# Patient Record
Sex: FEMALE | Race: BLACK OR AFRICAN AMERICAN | NOT HISPANIC OR LATINO | URBAN - METROPOLITAN AREA
[De-identification: names, ages, dates, MRNs, and addresses within clinical notes are randomized per-mention and may not be internally consistent; named-entity substitution may affect disease eponyms.]

---

## 2017-09-10 ENCOUNTER — EMERGENCY (EMERGENCY)
Facility: HOSPITAL | Age: 32
LOS: 1 days | Discharge: HOME | End: 2017-09-10
Attending: EMERGENCY MEDICINE
Payer: COMMERCIAL

## 2017-09-10 VITALS
RESPIRATION RATE: 20 BRPM | WEIGHT: 145.06 LBS | HEIGHT: 68 IN | DIASTOLIC BLOOD PRESSURE: 83 MMHG | HEART RATE: 97 BPM | SYSTOLIC BLOOD PRESSURE: 170 MMHG | OXYGEN SATURATION: 100 % | TEMPERATURE: 98 F

## 2017-09-10 PROCEDURE — 76856 US EXAM PELVIC COMPLETE: CPT | Mod: 26

## 2017-09-10 PROCEDURE — 76830 TRANSVAGINAL US NON-OB: CPT | Mod: 26

## 2017-09-10 PROCEDURE — 99285 EMERGENCY DEPT VISIT HI MDM: CPT

## 2017-09-10 RX ORDER — KETOROLAC TROMETHAMINE 30 MG/ML
30 SYRINGE (ML) INJECTION ONCE
Qty: 0 | Refills: 0 | Status: DISCONTINUED | OUTPATIENT
Start: 2017-09-10 | End: 2017-09-10

## 2017-09-10 RX ORDER — MORPHINE SULFATE 50 MG/1
4 CAPSULE, EXTENDED RELEASE ORAL ONCE
Qty: 0 | Refills: 0 | Status: DISCONTINUED | OUTPATIENT
Start: 2017-09-10 | End: 2017-09-10

## 2017-09-10 RX ORDER — SODIUM CHLORIDE 9 MG/ML
3 INJECTION INTRAMUSCULAR; INTRAVENOUS; SUBCUTANEOUS ONCE
Qty: 0 | Refills: 0 | Status: COMPLETED | OUTPATIENT
Start: 2017-09-10 | End: 2017-09-10

## 2017-09-10 RX ORDER — SODIUM CHLORIDE 9 MG/ML
1000 INJECTION INTRAMUSCULAR; INTRAVENOUS; SUBCUTANEOUS ONCE
Qty: 0 | Refills: 0 | Status: COMPLETED | OUTPATIENT
Start: 2017-09-10 | End: 2017-09-10

## 2017-09-10 NOTE — ED PROVIDER NOTE - CARE PLAN
Principal Discharge DX:	Retained products of conception following   Secondary Diagnosis:	UTI (urinary tract infection)

## 2017-09-10 NOTE — ED ADULT NURSE NOTE - OBJECTIVE STATEMENT
32 y/o female with radha of termination of pregnancy 2 weeks ago & previous D&C  c/o of bilaterally lwr back pain and pelvic pain 9/10 with spotting that began 3 days s/p termination. Sx locations are tender to touch. Pt. denies any N/V/D at this time. Pt. on cardiac monitor with Sinus Tach, regular rhythm . Skin is warm, dry, and intact.

## 2017-09-10 NOTE — ED PROVIDER NOTE - CHPI ED SYMPTOMS POS
DIFFICULTY BEARING WEIGHT/+back pain +cloudy urine +back pain +cloudy urine/PAIN/DIFFICULTY BEARING WEIGHT

## 2017-09-10 NOTE — ED PROVIDER NOTE - OBJECTIVE STATEMENT
This is a 32 y/o F presenting to the ED complaining of back pain. Patient reports that she was 2 months pregnant and had it terminated 2 weeks ago. Since termination, patient reports that she has been having back pain and contraction like pain. Denies fever. Patient reports similar pain in past s/p termination and reports fever at this time. She also reports cloudy urine that is malodorous. She states that she was given a muscle relaxer and a percocet. Reports the muscle relaxer relieved pain.   A6

## 2017-09-10 NOTE — ED PROVIDER NOTE - PROGRESS NOTE DETAILS
stable.   seen by gyn.  no active bleeding.  will d/c with abx, pain meds, and gyn follow up in 72 hrs for rpt beta

## 2017-09-11 VITALS
OXYGEN SATURATION: 99 % | RESPIRATION RATE: 19 BRPM | DIASTOLIC BLOOD PRESSURE: 87 MMHG | HEART RATE: 70 BPM | SYSTOLIC BLOOD PRESSURE: 151 MMHG | TEMPERATURE: 98 F

## 2017-09-11 DIAGNOSIS — O03.4 INCOMPLETE SPONTANEOUS ABORTION WITHOUT COMPLICATION: ICD-10-CM

## 2017-09-11 LAB
ALBUMIN SERPL ELPH-MCNC: 4.5 G/DL — SIGNIFICANT CHANGE UP (ref 3.3–5.2)
ALLERGY+IMMUNOLOGY DIAG STUDY NOTE: SIGNIFICANT CHANGE UP
ALLERGY+IMMUNOLOGY DIAG STUDY NOTE: SIGNIFICANT CHANGE UP
ALP SERPL-CCNC: 49 U/L — SIGNIFICANT CHANGE UP (ref 40–120)
ALT FLD-CCNC: 11 U/L — SIGNIFICANT CHANGE UP
ANION GAP SERPL CALC-SCNC: 12 MMOL/L — SIGNIFICANT CHANGE UP (ref 5–17)
APPEARANCE UR: ABNORMAL
AST SERPL-CCNC: 17 U/L — SIGNIFICANT CHANGE UP
BACTERIA # UR AUTO: ABNORMAL
BASOPHILS # BLD AUTO: 0 K/UL — SIGNIFICANT CHANGE UP (ref 0–0.2)
BASOPHILS NFR BLD AUTO: 0.2 % — SIGNIFICANT CHANGE UP (ref 0–2)
BILIRUB SERPL-MCNC: 0.5 MG/DL — SIGNIFICANT CHANGE UP (ref 0.4–2)
BILIRUB UR-MCNC: NEGATIVE — SIGNIFICANT CHANGE UP
BLD GP AB SCN SERPL QL: ABNORMAL
BUN SERPL-MCNC: 10 MG/DL — SIGNIFICANT CHANGE UP (ref 8–20)
CALCIUM SERPL-MCNC: 9.3 MG/DL — SIGNIFICANT CHANGE UP (ref 8.6–10.2)
CHLORIDE SERPL-SCNC: 104 MMOL/L — SIGNIFICANT CHANGE UP (ref 98–107)
CO2 SERPL-SCNC: 25 MMOL/L — SIGNIFICANT CHANGE UP (ref 22–29)
COLOR SPEC: YELLOW — SIGNIFICANT CHANGE UP
CREAT SERPL-MCNC: 0.68 MG/DL — SIGNIFICANT CHANGE UP (ref 0.5–1.3)
DIFF PNL FLD: ABNORMAL
DIR ANTIGLOB POLYSPECIFIC INTERPRETATION: SIGNIFICANT CHANGE UP
EOSINOPHIL # BLD AUTO: 0 K/UL — SIGNIFICANT CHANGE UP (ref 0–0.5)
EOSINOPHIL NFR BLD AUTO: 0.6 % — SIGNIFICANT CHANGE UP (ref 0–6)
EPI CELLS # UR: SIGNIFICANT CHANGE UP
GLUCOSE SERPL-MCNC: 92 MG/DL — SIGNIFICANT CHANGE UP (ref 70–115)
GLUCOSE UR QL: NEGATIVE MG/DL — SIGNIFICANT CHANGE UP
HCG SERPL-ACNC: 215.9 MIU/ML — SIGNIFICANT CHANGE UP
HCG UR QL: POSITIVE
HCT VFR BLD CALC: 32 % — LOW (ref 37–47)
HGB BLD-MCNC: 10.8 G/DL — LOW (ref 12–16)
KETONES UR-MCNC: NEGATIVE — SIGNIFICANT CHANGE UP
LEUKOCYTE ESTERASE UR-ACNC: ABNORMAL
LYMPHOCYTES # BLD AUTO: 2.3 K/UL — SIGNIFICANT CHANGE UP (ref 1–4.8)
LYMPHOCYTES # BLD AUTO: 27.3 % — SIGNIFICANT CHANGE UP (ref 20–55)
MCHC RBC-ENTMCNC: 29 PG — SIGNIFICANT CHANGE UP (ref 27–31)
MCHC RBC-ENTMCNC: 33.8 G/DL — SIGNIFICANT CHANGE UP (ref 32–36)
MCV RBC AUTO: 86 FL — SIGNIFICANT CHANGE UP (ref 81–99)
MONOCYTES # BLD AUTO: 0.4 K/UL — SIGNIFICANT CHANGE UP (ref 0–0.8)
MONOCYTES NFR BLD AUTO: 4.3 % — SIGNIFICANT CHANGE UP (ref 3–10)
NEUTROPHILS # BLD AUTO: 5.8 K/UL — SIGNIFICANT CHANGE UP (ref 1.8–8)
NEUTROPHILS NFR BLD AUTO: 67.4 % — SIGNIFICANT CHANGE UP (ref 37–73)
NITRITE UR-MCNC: NEGATIVE — SIGNIFICANT CHANGE UP
PH UR: 6 — SIGNIFICANT CHANGE UP (ref 5–8)
PLATELET # BLD AUTO: 389 K/UL — SIGNIFICANT CHANGE UP (ref 150–400)
POTASSIUM SERPL-MCNC: 3.7 MMOL/L — SIGNIFICANT CHANGE UP (ref 3.5–5.3)
POTASSIUM SERPL-SCNC: 3.7 MMOL/L — SIGNIFICANT CHANGE UP (ref 3.5–5.3)
PROT SERPL-MCNC: 7.2 G/DL — SIGNIFICANT CHANGE UP (ref 6.6–8.7)
PROT UR-MCNC: NEGATIVE MG/DL — SIGNIFICANT CHANGE UP
RBC # BLD: 3.72 M/UL — LOW (ref 4.4–5.2)
RBC # FLD: 13.9 % — SIGNIFICANT CHANGE UP (ref 11–15.6)
RBC CASTS # UR COMP ASSIST: ABNORMAL /HPF (ref 0–4)
SODIUM SERPL-SCNC: 141 MMOL/L — SIGNIFICANT CHANGE UP (ref 135–145)
SP GR SPEC: 1.01 — SIGNIFICANT CHANGE UP (ref 1.01–1.02)
TYPE + AB SCN PNL BLD: SIGNIFICANT CHANGE UP
UROBILINOGEN FLD QL: NEGATIVE MG/DL — SIGNIFICANT CHANGE UP
WBC # BLD: 8.6 K/UL — SIGNIFICANT CHANGE UP (ref 4.8–10.8)
WBC # FLD AUTO: 8.6 K/UL — SIGNIFICANT CHANGE UP (ref 4.8–10.8)
WBC UR QL: SIGNIFICANT CHANGE UP

## 2017-09-11 PROCEDURE — 86077 PHYS BLOOD BANK SERV XMATCH: CPT

## 2017-09-11 RX ORDER — CEFPODOXIME PROXETIL 100 MG
1 TABLET ORAL
Qty: 20 | Refills: 0 | OUTPATIENT
Start: 2017-09-11 | End: 2017-09-21

## 2017-09-11 RX ORDER — HYDROMORPHONE HYDROCHLORIDE 2 MG/ML
1 INJECTION INTRAMUSCULAR; INTRAVENOUS; SUBCUTANEOUS ONCE
Qty: 0 | Refills: 0 | Status: DISCONTINUED | OUTPATIENT
Start: 2017-09-11 | End: 2017-09-11

## 2017-09-11 RX ORDER — CEFTRIAXONE 500 MG/1
1 INJECTION, POWDER, FOR SOLUTION INTRAMUSCULAR; INTRAVENOUS ONCE
Qty: 0 | Refills: 0 | Status: COMPLETED | OUTPATIENT
Start: 2017-09-11 | End: 2017-09-11

## 2017-09-11 RX ORDER — HYDROMORPHONE HYDROCHLORIDE 2 MG/ML
0.5 INJECTION INTRAMUSCULAR; INTRAVENOUS; SUBCUTANEOUS ONCE
Qty: 0 | Refills: 0 | Status: DISCONTINUED | OUTPATIENT
Start: 2017-09-11 | End: 2017-09-11

## 2017-09-11 RX ADMIN — Medication 30 MILLIGRAM(S): at 00:29

## 2017-09-11 RX ADMIN — MORPHINE SULFATE 4 MILLIGRAM(S): 50 CAPSULE, EXTENDED RELEASE ORAL at 00:29

## 2017-09-11 RX ADMIN — SODIUM CHLORIDE 1000 MILLILITER(S): 9 INJECTION INTRAMUSCULAR; INTRAVENOUS; SUBCUTANEOUS at 00:28

## 2017-09-11 RX ADMIN — MORPHINE SULFATE 4 MILLIGRAM(S): 50 CAPSULE, EXTENDED RELEASE ORAL at 00:30

## 2017-09-11 RX ADMIN — HYDROMORPHONE HYDROCHLORIDE 1 MILLIGRAM(S): 2 INJECTION INTRAMUSCULAR; INTRAVENOUS; SUBCUTANEOUS at 04:20

## 2017-09-11 RX ADMIN — CEFTRIAXONE 100 GRAM(S): 500 INJECTION, POWDER, FOR SOLUTION INTRAMUSCULAR; INTRAVENOUS at 04:20

## 2017-09-11 RX ADMIN — SODIUM CHLORIDE 3 MILLILITER(S): 9 INJECTION INTRAMUSCULAR; INTRAVENOUS; SUBCUTANEOUS at 00:29

## 2017-09-11 RX ADMIN — HYDROMORPHONE HYDROCHLORIDE 0.5 MILLIGRAM(S): 2 INJECTION INTRAMUSCULAR; INTRAVENOUS; SUBCUTANEOUS at 01:20

## 2017-09-11 RX ADMIN — HYDROMORPHONE HYDROCHLORIDE 0.5 MILLIGRAM(S): 2 INJECTION INTRAMUSCULAR; INTRAVENOUS; SUBCUTANEOUS at 02:06

## 2017-09-11 NOTE — CONSULT NOTE ADULT - ASSESSMENT
A 31y  presenting with abdominal cramping and bleeding s/p termination on 17. Patient has small amount of retained products on US and her HCG is low at 215.9. She is likely to expel the rest of the products without a need for a D&C. There is no acute bleeding.

## 2017-09-11 NOTE — CONSULT NOTE ADULT - SUBJECTIVE AND OBJECTIVE BOX
DEYA ARTEAGA 31y  presenting with abdominal cramping and bleeding s/p termination on 17. Her pain comes and goes and she had episodes of bleeding today.    PMH: none  PSH: D&C x7, left breast cyst removal 17 years ago  Allergies: No Known Allergies  Meds: none  POB/GYN Hx: leep procedure; D&C x7; 3     Vital Signs:  Vital Signs Last 24 Hrs  T(C): 36.8 (11 Sep 2017 03:45), Max: 36.9 (10 Sep 2017 21:28)  T(F): 98.3 (11 Sep 2017 03:45), Max: 98.5 (10 Sep 2017 21:28)  HR: 70 (11 Sep 2017 03:45) (64 - 104)  BP: 151/87 (11 Sep 2017 03:45) (151/87 - 172/103)  RR: 19 (11 Sep 2017 03:45) (18 - 20)  SpO2: 99% (11 Sep 2017 03:45) (99% - 100%)    Physical Exam:  Speculum: Normal external genetalia, no lesions in vaginal canal, no pooling in vaginal vault, no discharge or bleeding from cervical os.     Labs:                      10.8   8.6   )-----------( 389      ( 11 Sep 2017 00:29 )             32.0     HCG Quantitative, Serum: 215.9 mIU/mL (17 @ 00:29)    Radiology:  < from: US Transvaginal (09.10.17 @ 23:32) >   EXAM:  US PELVIC COMPLETE                         EXAM:  US TRANSVAGINAL                          PROCEDURE DATE:  09/10/2017          INTERPRETATION:  CLINICAL INFORMATION: Bleeding, recent surgical   termination pregnancy on 2017    Beta-hCG pending    COMPARISON: Ultrasound 2016    TECHNIQUE:     Transabdominal and Endovaginal pelvic sonogram. Color and Spectral   Doppler was performed.    FINDINGS:    Uterus/endometrium: 8.4 x 4.4 x 6.1 cm. The endometrium measures 1 cm and   is heterogeneous. Doppler imaging demonstrates a stalk of high velocity   flow extending to the endometrium at the fundus (image 24). Peak systolic   velocity along this stalk measures 47.7 cm/s.    Right ovary: 3.1 x 1.5 x 2.9 cm. Flow is preserved    Left ovary: 3.1 x 1.5 x 2.7 cm. Flow is preserved    Fluid: None.    IMPRESSION:    The endometrium is heterogeneous although not overtly thickened. Doppler   imaging demonstrates a stalk of high velocity flow extending to the   endometrium at the fundus with a peak systolic velocity of 47 cm/s.   Findings suggest a small amount of retained products of conception.   Correlate with beta hCG and follow-up as necessary.      < end of copied text >

## 2017-09-13 ENCOUNTER — EMERGENCY (EMERGENCY)
Facility: HOSPITAL | Age: 32
LOS: 1 days | Discharge: HOME | End: 2017-09-13
Attending: EMERGENCY MEDICINE
Payer: COMMERCIAL

## 2017-09-13 VITALS
OXYGEN SATURATION: 99 % | WEIGHT: 145.06 LBS | HEART RATE: 82 BPM | HEIGHT: 67 IN | SYSTOLIC BLOOD PRESSURE: 148 MMHG | TEMPERATURE: 99 F | RESPIRATION RATE: 16 BRPM | DIASTOLIC BLOOD PRESSURE: 102 MMHG

## 2017-09-13 DIAGNOSIS — O03.9 COMPLETE OR UNSPECIFIED SPONTANEOUS ABORTION WITHOUT COMPLICATION: ICD-10-CM

## 2017-09-13 LAB
ALBUMIN SERPL ELPH-MCNC: 4.3 G/DL — SIGNIFICANT CHANGE UP (ref 3.3–5.2)
ALP SERPL-CCNC: 47 U/L — SIGNIFICANT CHANGE UP (ref 40–120)
ALT FLD-CCNC: 13 U/L — SIGNIFICANT CHANGE UP
ANION GAP SERPL CALC-SCNC: 13 MMOL/L — SIGNIFICANT CHANGE UP (ref 5–17)
APPEARANCE UR: CLEAR — SIGNIFICANT CHANGE UP
AST SERPL-CCNC: 21 U/L — SIGNIFICANT CHANGE UP
BASOPHILS # BLD AUTO: 0 K/UL — SIGNIFICANT CHANGE UP (ref 0–0.2)
BASOPHILS NFR BLD AUTO: 0.3 % — SIGNIFICANT CHANGE UP (ref 0–2)
BILIRUB SERPL-MCNC: 0.3 MG/DL — LOW (ref 0.4–2)
BILIRUB UR-MCNC: NEGATIVE — SIGNIFICANT CHANGE UP
BUN SERPL-MCNC: 8 MG/DL — SIGNIFICANT CHANGE UP (ref 8–20)
CALCIUM SERPL-MCNC: 9 MG/DL — SIGNIFICANT CHANGE UP (ref 8.6–10.2)
CHLORIDE SERPL-SCNC: 101 MMOL/L — SIGNIFICANT CHANGE UP (ref 98–107)
CO2 SERPL-SCNC: 27 MMOL/L — SIGNIFICANT CHANGE UP (ref 22–29)
COLOR SPEC: YELLOW — SIGNIFICANT CHANGE UP
CREAT SERPL-MCNC: 0.71 MG/DL — SIGNIFICANT CHANGE UP (ref 0.5–1.3)
DIFF PNL FLD: NEGATIVE — SIGNIFICANT CHANGE UP
EOSINOPHIL # BLD AUTO: 0 K/UL — SIGNIFICANT CHANGE UP (ref 0–0.5)
EOSINOPHIL NFR BLD AUTO: 0.3 % — SIGNIFICANT CHANGE UP (ref 0–6)
EPI CELLS # UR: SIGNIFICANT CHANGE UP
GLUCOSE SERPL-MCNC: 99 MG/DL — SIGNIFICANT CHANGE UP (ref 70–115)
GLUCOSE UR QL: NEGATIVE MG/DL — SIGNIFICANT CHANGE UP
HCG SERPL-ACNC: 137.7 MIU/ML — SIGNIFICANT CHANGE UP
HCT VFR BLD CALC: 33.9 % — LOW (ref 37–47)
HGB BLD-MCNC: 11.1 G/DL — LOW (ref 12–16)
KETONES UR-MCNC: NEGATIVE — SIGNIFICANT CHANGE UP
LEUKOCYTE ESTERASE UR-ACNC: ABNORMAL
LYMPHOCYTES # BLD AUTO: 1.6 K/UL — SIGNIFICANT CHANGE UP (ref 1–4.8)
LYMPHOCYTES # BLD AUTO: 26.6 % — SIGNIFICANT CHANGE UP (ref 20–55)
MCHC RBC-ENTMCNC: 28.8 PG — SIGNIFICANT CHANGE UP (ref 27–31)
MCHC RBC-ENTMCNC: 32.7 G/DL — SIGNIFICANT CHANGE UP (ref 32–36)
MCV RBC AUTO: 87.8 FL — SIGNIFICANT CHANGE UP (ref 81–99)
MONOCYTES # BLD AUTO: 0.2 K/UL — SIGNIFICANT CHANGE UP (ref 0–0.8)
MONOCYTES NFR BLD AUTO: 4.3 % — SIGNIFICANT CHANGE UP (ref 3–10)
NEUTROPHILS # BLD AUTO: 4 K/UL — SIGNIFICANT CHANGE UP (ref 1.8–8)
NEUTROPHILS NFR BLD AUTO: 68.3 % — SIGNIFICANT CHANGE UP (ref 37–73)
NITRITE UR-MCNC: NEGATIVE — SIGNIFICANT CHANGE UP
PH UR: 6 — SIGNIFICANT CHANGE UP (ref 5–8)
PLATELET # BLD AUTO: 406 K/UL — HIGH (ref 150–400)
POTASSIUM SERPL-MCNC: 3.8 MMOL/L — SIGNIFICANT CHANGE UP (ref 3.5–5.3)
POTASSIUM SERPL-SCNC: 3.8 MMOL/L — SIGNIFICANT CHANGE UP (ref 3.5–5.3)
PROT SERPL-MCNC: 7.7 G/DL — SIGNIFICANT CHANGE UP (ref 6.6–8.7)
PROT UR-MCNC: NEGATIVE MG/DL — SIGNIFICANT CHANGE UP
RBC # BLD: 3.86 M/UL — LOW (ref 4.4–5.2)
RBC # FLD: 13.6 % — SIGNIFICANT CHANGE UP (ref 11–15.6)
SODIUM SERPL-SCNC: 141 MMOL/L — SIGNIFICANT CHANGE UP (ref 135–145)
SP GR SPEC: 1.01 — SIGNIFICANT CHANGE UP (ref 1.01–1.02)
UROBILINOGEN FLD QL: NEGATIVE MG/DL — SIGNIFICANT CHANGE UP
WBC # BLD: 5.8 K/UL — SIGNIFICANT CHANGE UP (ref 4.8–10.8)
WBC # FLD AUTO: 5.8 K/UL — SIGNIFICANT CHANGE UP (ref 4.8–10.8)
WBC UR QL: SIGNIFICANT CHANGE UP

## 2017-09-13 PROCEDURE — 81001 URINALYSIS AUTO W/SCOPE: CPT

## 2017-09-13 PROCEDURE — 80053 COMPREHEN METABOLIC PANEL: CPT

## 2017-09-13 PROCEDURE — 85027 COMPLETE CBC AUTOMATED: CPT

## 2017-09-13 PROCEDURE — 86870 RBC ANTIBODY IDENTIFICATION: CPT

## 2017-09-13 PROCEDURE — 84702 CHORIONIC GONADOTROPIN TEST: CPT

## 2017-09-13 PROCEDURE — 86850 RBC ANTIBODY SCREEN: CPT

## 2017-09-13 PROCEDURE — 99284 EMERGENCY DEPT VISIT MOD MDM: CPT | Mod: 25

## 2017-09-13 PROCEDURE — 99284 EMERGENCY DEPT VISIT MOD MDM: CPT

## 2017-09-13 PROCEDURE — 76830 TRANSVAGINAL US NON-OB: CPT

## 2017-09-13 PROCEDURE — 96374 THER/PROPH/DIAG INJ IV PUSH: CPT

## 2017-09-13 PROCEDURE — 96375 TX/PRO/DX INJ NEW DRUG ADDON: CPT

## 2017-09-13 PROCEDURE — 87086 URINE CULTURE/COLONY COUNT: CPT

## 2017-09-13 PROCEDURE — 81025 URINE PREGNANCY TEST: CPT

## 2017-09-13 PROCEDURE — 36415 COLL VENOUS BLD VENIPUNCTURE: CPT

## 2017-09-13 PROCEDURE — 86901 BLOOD TYPING SEROLOGIC RH(D): CPT

## 2017-09-13 PROCEDURE — 86880 COOMBS TEST DIRECT: CPT

## 2017-09-13 PROCEDURE — 86900 BLOOD TYPING SEROLOGIC ABO: CPT

## 2017-09-13 PROCEDURE — 96376 TX/PRO/DX INJ SAME DRUG ADON: CPT

## 2017-09-13 PROCEDURE — 76856 US EXAM PELVIC COMPLETE: CPT

## 2017-09-13 RX ORDER — SODIUM CHLORIDE 9 MG/ML
3 INJECTION INTRAMUSCULAR; INTRAVENOUS; SUBCUTANEOUS ONCE
Qty: 0 | Refills: 0 | Status: COMPLETED | OUTPATIENT
Start: 2017-09-13 | End: 2017-09-13

## 2017-09-13 RX ORDER — ACETAMINOPHEN 500 MG
1000 TABLET ORAL ONCE
Qty: 0 | Refills: 0 | Status: DISCONTINUED | OUTPATIENT
Start: 2017-09-13 | End: 2017-09-17

## 2017-09-13 RX ORDER — KETOROLAC TROMETHAMINE 30 MG/ML
30 SYRINGE (ML) INJECTION ONCE
Qty: 0 | Refills: 0 | Status: DISCONTINUED | OUTPATIENT
Start: 2017-09-13 | End: 2017-09-13

## 2017-09-13 RX ADMIN — SODIUM CHLORIDE 3 MILLILITER(S): 9 INJECTION INTRAMUSCULAR; INTRAVENOUS; SUBCUTANEOUS at 12:04

## 2017-09-13 RX ADMIN — Medication 30 MILLIGRAM(S): at 12:04

## 2017-09-13 NOTE — CONSULT NOTE ADULT - PROBLEM SELECTOR RECOMMENDATION 9
- No further intervention at this time  - Motrin 600mg PO for pain control  - Advised patient to follow up with Dr. Carreon to trend quantitative HCG

## 2017-09-13 NOTE — CONSULT NOTE ADULT - SUBJECTIVE AND OBJECTIVE BOX
HPI:  31y  with a surgical  at [facility] done on 17 who presents to the ED today with lower back pain and nausea. She was seen at the ED on  with abdominal pain and vaginal bleeding, transvaginal ultrasound done at that time showed small amount of retained products of conception and beta-hCG level at 215.9. Patient was discharged from the ED with antibiotics and pain medication, and advised to follow up on her beta-hCG level as an outpatient. Patient reports that she went to [facility] today, received a sonogram and was told that there were no retained product of conception. Patient denies any bleeding since her last ED visit. Denies fever, chills, dysuria.     Vital Signs Last 24 Hrs  T(C): 37 (13 Sep 2017 09:28), Max: 37 (13 Sep 2017 09:28)  T(F): 98.6 (13 Sep 2017 09:28), Max: 98.6 (13 Sep 2017 09:28)  HR: 82 (13 Sep 2017 09:28) (82 - 82)  BP: 148/102 (13 Sep 2017 09:28) (148/102 - 148/102)  RR: 16 (13 Sep 2017 09:28) (16 - 16)  SpO2: 99% (13 Sep 2017 09:28) (99% - 99%)    PMHX; HTN  PSHX; elective    POBHX; D&C x7,  x3  PGYNHX: none    Allergies  No Known Allergies / Intolerances    MEDS:  cefpodoxime 100 mg oral tablet: 1 tab(s) orally 2 times a day           Percocet 5/325 oral tablet: 1 tab(s) orally every 6 hours - for severe pain MDD:4    PHYSICAL EXAM:  General: A&Ox3, in no acute distress  CHEST/LUNG: Clear to percussion bilaterally; No rales, rhonchi, wheezing, or rubs  HEART: Regular rate and rhythm; No murmurs, rubs, or gallops  ABDOMEN: Soft, Nontender, Nondistended; Bowel sounds present  EXTREMITIES:  2+ Peripheral Pulses, No clubbing, cyanosis, or edema  PELVIC:        EXTERNAL GENITALIA - normal        VAGINA - normal vaginal mucosa, no discharge or blood clots visible         CERVIX - closed cervical os with no active bleeding      LABS:                        11.1   5.8   )-----------( 406      ( 13 Sep 2017 11:47 )             33.9         quantitative HC.7    141  |  101  |  8.0  ----------------------------<  99  3.8   |  27.0  |  0.71    Ca    9.0      13 Sep 2017 11:47    TPro  7.7  /  Alb  4.3  /  TBili  0.3<L>  /  DBili  x   /  AST  21  /  ALT  13  /  AlkPhos  47      Urinalysis Basic - ( 13 Sep 2017 11:50 )    Color: Yellow / Appearance: Clear / S.015 / pH: x  Gluc: x / Ketone: Negative  / Bili: Negative / Urobili: Negative mg/dL   Blood: x / Protein: Negative mg/dL / Nitrite: Negative   Leuk Esterase: Trace / RBC: x / WBC 3-5   Sq Epi: x / Non Sq Epi: x / Bacteria: x      RADIOLOGY STUDIES:     EXAM:  US PELVIC COMPLETE                         EXAM:  US TRANSVAGINAL                          PROCEDURE DATE:  09/10/2017          INTERPRETATION:  CLINICAL INFORMATION: Bleeding, recent surgical   termination pregnancy on 2017    Beta-hCG pending    COMPARISON: Ultrasound 2016    TECHNIQUE:     Transabdominal and Endovaginal pelvic sonogram. Color and Spectral   Doppler was performed.    FINDINGS:    Uterus/endometrium: 8.4 x 4.4 x 6.1 cm. The endometrium measures 1 cm and   is heterogeneous. Doppler imaging demonstrates a stalk of high velocity   flow extending to the endometrium at the fundus (image 24). Peak systolic   velocity along this stalk measures 47.7 cm/s.    Right ovary: 3.1 x 1.5 x 2.9 cm. Flow is preserved    Left ovary: 3.1 x 1.5 x 2.7 cm. Flow is preserved    Fluid: None.    IMPRESSION:    The endometrium is heterogeneous although not overtly thickened. Doppler   imaging demonstrates a stalk of high velocity flow extending to the   endometrium at the fundus with a peak systolic velocity of 47 cm/s.   Findings suggest a small amount of retained products of conception.   Correlate with beta hCG and follow-up as necessary.

## 2017-09-13 NOTE — ED ADULT NURSE NOTE - OBJECTIVE STATEMENT
Pt admitted to ED c/o lower back pain. Pt states she had a TOP on 8/26 and has had lower back pain without vag bleeding. Was seen here on Sept 10, and told she had some retained products, f/u today at [facility],  ayush miranda , no tissue seen.

## 2017-09-13 NOTE — ED STATDOCS - OBJECTIVE STATEMENT
30 y/o F pt with a PMHx of back pain, trouble walking that onset 5 days ago s/p surgical  17. Pt explains that she came to this ED a few days ago where she was told that she had remnants of the fetus. Had a sonogram done in this ED 09/11/10.  [facility] and was informed that she did not have any remnants. denies fever. denies HA or neck pain. no chest pain or sob. no abd pain. no n/v/d. no urinary f/u/d. no motor or sensory deficits. denies illicit drug use. no recent travel. no rash. no other acute issues symptoms or concerns 30 y/o F pt with a PMHx of back pain, trouble walking that onset 5 days ago s/p surgical  17. Pt explains that she came to this ED a few days ago where she was told that she had remnants of the fetus. Had a sonogram done in this ED 09/11/10.  [facility] and was informed that she did not have any remnants. denies fever. denies HA or neck pain. no chest pain or sob. no abd pain. no n/v/d. no urinary f/u/d. no motor or sensory deficits. denies illicit drug use. no recent travel. no rash. no other acute issues symptoms or concerns. NKDA.  OBGYN: Dr. Carreon.

## 2017-09-13 NOTE — CONSULT NOTE ADULT - ASSESSMENT
31y  s/p recent surgical  presents with lower back pain and nausea. Quantitative HCG down-trending, at 137. Her exam reveals closed cervical os with no active bleeding.

## 2017-09-13 NOTE — ED STATDOCS - PROGRESS NOTE DETAILS
patient re-evaluated c/o right sided abdominal pain x 1 day associated with "milky vaginal discharge" radiating to lower back, she reports is s/p [facility] in [facility]/11 and was told had retained products and at that time beta hcg 200.  Patient reports followed up today at [facility] and was told no e/o retained products on internal US.  Patient admits to nausea.  PE: +RUQ TTP, and R suprapubic tenderness no masses or hernias. spoke to GYN resident will come down and evaluate patient.  Pending labs and UA results. beta hcg 100, GYN reports no indication for D&C, patient has no vaginal bleeding on exam, offered US and pain medication and patient refused, prefers to go home.

## 2017-09-14 LAB
CULTURE RESULTS: NO GROWTH — SIGNIFICANT CHANGE UP
SPECIMEN SOURCE: SIGNIFICANT CHANGE UP

## 2017-09-28 NOTE — ED STATDOCS - ATTENDING CONTRIBUTION TO CARE
<<-----Click on this checkbox to enter Pre-Op Dx I, Ibrahima Lowery, performed the initial face to face bedside interview with this patient regarding history of present illness, review of symptoms and relevant past medical, social and family history.  I completed an independent physical examination.  I was the initial provider who evaluated this patient. I have signed out the follow up of any pending tests (i.e. labs, radiological studies) to the ACP.  I have communicated the patient’s plan of care and disposition with the ACP.

## 2017-12-21 ENCOUNTER — EMERGENCY (EMERGENCY)
Facility: HOSPITAL | Age: 32
LOS: 1 days | Discharge: HOME | End: 2017-12-21
Attending: EMERGENCY MEDICINE
Payer: COMMERCIAL

## 2017-12-21 VITALS
SYSTOLIC BLOOD PRESSURE: 136 MMHG | OXYGEN SATURATION: 99 % | WEIGHT: 149.91 LBS | RESPIRATION RATE: 18 BRPM | HEART RATE: 98 BPM | HEIGHT: 67 IN | DIASTOLIC BLOOD PRESSURE: 94 MMHG | TEMPERATURE: 99 F

## 2017-12-21 VITALS
HEART RATE: 68 BPM | TEMPERATURE: 98 F | OXYGEN SATURATION: 99 % | RESPIRATION RATE: 14 BRPM | SYSTOLIC BLOOD PRESSURE: 109 MMHG | DIASTOLIC BLOOD PRESSURE: 72 MMHG

## 2017-12-21 LAB
ALBUMIN SERPL ELPH-MCNC: 4.5 G/DL — SIGNIFICANT CHANGE UP (ref 3.3–5.2)
ALP SERPL-CCNC: 47 U/L — SIGNIFICANT CHANGE UP (ref 40–120)
ALT FLD-CCNC: 25 U/L — SIGNIFICANT CHANGE UP
ANION GAP SERPL CALC-SCNC: 13 MMOL/L — SIGNIFICANT CHANGE UP (ref 5–17)
APPEARANCE UR: CLEAR — SIGNIFICANT CHANGE UP
AST SERPL-CCNC: 20 U/L — SIGNIFICANT CHANGE UP
BASOPHILS # BLD AUTO: 0 K/UL — SIGNIFICANT CHANGE UP (ref 0–0.2)
BASOPHILS NFR BLD AUTO: 0.2 % — SIGNIFICANT CHANGE UP (ref 0–2)
BILIRUB SERPL-MCNC: 0.3 MG/DL — LOW (ref 0.4–2)
BILIRUB UR-MCNC: NEGATIVE — SIGNIFICANT CHANGE UP
BUN SERPL-MCNC: 9 MG/DL — SIGNIFICANT CHANGE UP (ref 8–20)
CALCIUM SERPL-MCNC: 9.7 MG/DL — SIGNIFICANT CHANGE UP (ref 8.6–10.2)
CHLORIDE SERPL-SCNC: 100 MMOL/L — SIGNIFICANT CHANGE UP (ref 98–107)
CO2 SERPL-SCNC: 24 MMOL/L — SIGNIFICANT CHANGE UP (ref 22–29)
COLOR SPEC: YELLOW — SIGNIFICANT CHANGE UP
CREAT SERPL-MCNC: 0.64 MG/DL — SIGNIFICANT CHANGE UP (ref 0.5–1.3)
DIFF PNL FLD: NEGATIVE — SIGNIFICANT CHANGE UP
EOSINOPHIL # BLD AUTO: 0 K/UL — SIGNIFICANT CHANGE UP (ref 0–0.5)
EOSINOPHIL NFR BLD AUTO: 0.2 % — SIGNIFICANT CHANGE UP (ref 0–6)
GLUCOSE SERPL-MCNC: 109 MG/DL — SIGNIFICANT CHANGE UP (ref 70–115)
GLUCOSE UR QL: NEGATIVE MG/DL — SIGNIFICANT CHANGE UP
HCG SERPL-ACNC: SIGNIFICANT CHANGE UP MIU/ML
HCG UR QL: POSITIVE
HCT VFR BLD CALC: 34.9 % — LOW (ref 37–47)
HGB BLD-MCNC: 11.9 G/DL — LOW (ref 12–16)
KETONES UR-MCNC: NEGATIVE — SIGNIFICANT CHANGE UP
LEUKOCYTE ESTERASE UR-ACNC: ABNORMAL
LYMPHOCYTES # BLD AUTO: 1.8 K/UL — SIGNIFICANT CHANGE UP (ref 1–4.8)
LYMPHOCYTES # BLD AUTO: 15.9 % — LOW (ref 20–55)
MCHC RBC-ENTMCNC: 29.3 PG — SIGNIFICANT CHANGE UP (ref 27–31)
MCHC RBC-ENTMCNC: 34.1 G/DL — SIGNIFICANT CHANGE UP (ref 32–36)
MCV RBC AUTO: 86 FL — SIGNIFICANT CHANGE UP (ref 81–99)
MONOCYTES # BLD AUTO: 0.4 K/UL — SIGNIFICANT CHANGE UP (ref 0–0.8)
MONOCYTES NFR BLD AUTO: 3.3 % — SIGNIFICANT CHANGE UP (ref 3–10)
NEUTROPHILS # BLD AUTO: 9 K/UL — HIGH (ref 1.8–8)
NEUTROPHILS NFR BLD AUTO: 80.2 % — HIGH (ref 37–73)
NITRITE UR-MCNC: NEGATIVE — SIGNIFICANT CHANGE UP
PH UR: 5 — SIGNIFICANT CHANGE UP (ref 5–8)
PLATELET # BLD AUTO: 354 K/UL — SIGNIFICANT CHANGE UP (ref 150–400)
POTASSIUM SERPL-MCNC: 4.3 MMOL/L — SIGNIFICANT CHANGE UP (ref 3.5–5.3)
POTASSIUM SERPL-SCNC: 4.3 MMOL/L — SIGNIFICANT CHANGE UP (ref 3.5–5.3)
PROT SERPL-MCNC: 7.8 G/DL — SIGNIFICANT CHANGE UP (ref 6.6–8.7)
PROT UR-MCNC: NEGATIVE MG/DL — SIGNIFICANT CHANGE UP
RBC # BLD: 4.06 M/UL — LOW (ref 4.4–5.2)
RBC # FLD: 13 % — SIGNIFICANT CHANGE UP (ref 11–15.6)
SODIUM SERPL-SCNC: 137 MMOL/L — SIGNIFICANT CHANGE UP (ref 135–145)
SP GR SPEC: 1.02 — SIGNIFICANT CHANGE UP (ref 1.01–1.02)
UROBILINOGEN FLD QL: NEGATIVE MG/DL — SIGNIFICANT CHANGE UP
WBC # BLD: 11.2 K/UL — HIGH (ref 4.8–10.8)
WBC # FLD AUTO: 11.2 K/UL — HIGH (ref 4.8–10.8)

## 2017-12-21 PROCEDURE — 99284 EMERGENCY DEPT VISIT MOD MDM: CPT | Mod: 25

## 2017-12-21 PROCEDURE — 80053 COMPREHEN METABOLIC PANEL: CPT

## 2017-12-21 PROCEDURE — 99284 EMERGENCY DEPT VISIT MOD MDM: CPT

## 2017-12-21 PROCEDURE — 96374 THER/PROPH/DIAG INJ IV PUSH: CPT

## 2017-12-21 PROCEDURE — 76801 OB US < 14 WKS SINGLE FETUS: CPT

## 2017-12-21 PROCEDURE — 81025 URINE PREGNANCY TEST: CPT

## 2017-12-21 PROCEDURE — 87186 SC STD MICRODIL/AGAR DIL: CPT

## 2017-12-21 PROCEDURE — 96375 TX/PRO/DX INJ NEW DRUG ADDON: CPT

## 2017-12-21 PROCEDURE — 36415 COLL VENOUS BLD VENIPUNCTURE: CPT

## 2017-12-21 PROCEDURE — 81001 URINALYSIS AUTO W/SCOPE: CPT

## 2017-12-21 PROCEDURE — 76801 OB US < 14 WKS SINGLE FETUS: CPT | Mod: 26

## 2017-12-21 PROCEDURE — 85027 COMPLETE CBC AUTOMATED: CPT

## 2017-12-21 PROCEDURE — 84702 CHORIONIC GONADOTROPIN TEST: CPT

## 2017-12-21 PROCEDURE — 87086 URINE CULTURE/COLONY COUNT: CPT

## 2017-12-21 RX ORDER — DOXYLAMINE SUCCINATE AND PYRIDOXINE HYDROCHLORIDE, DELAYED RELEASE TABLETS 10 MG/10 MG 10; 10 MG/1; MG/1
2 TABLET, DELAYED RELEASE ORAL
Qty: 60 | Refills: 0 | OUTPATIENT
Start: 2017-12-21 | End: 2018-01-19

## 2017-12-21 RX ORDER — CEFTRIAXONE 500 MG/1
1 INJECTION, POWDER, FOR SOLUTION INTRAMUSCULAR; INTRAVENOUS ONCE
Qty: 0 | Refills: 0 | Status: COMPLETED | OUTPATIENT
Start: 2017-12-21 | End: 2017-12-21

## 2017-12-21 RX ORDER — ACETAMINOPHEN 500 MG
650 TABLET ORAL ONCE
Qty: 0 | Refills: 0 | Status: COMPLETED | OUTPATIENT
Start: 2017-12-21 | End: 2017-12-21

## 2017-12-21 RX ORDER — ONDANSETRON 8 MG/1
4 TABLET, FILM COATED ORAL ONCE
Qty: 0 | Refills: 0 | Status: COMPLETED | OUTPATIENT
Start: 2017-12-21 | End: 2017-12-21

## 2017-12-21 RX ORDER — METOCLOPRAMIDE HCL 10 MG
1 TABLET ORAL
Qty: 30 | Refills: 0 | OUTPATIENT
Start: 2017-12-21 | End: 2017-12-30

## 2017-12-21 RX ORDER — CEPHALEXIN 500 MG
1 CAPSULE ORAL
Qty: 30 | Refills: 0 | OUTPATIENT
Start: 2017-12-21 | End: 2017-12-30

## 2017-12-21 RX ORDER — SODIUM CHLORIDE 9 MG/ML
1000 INJECTION INTRAMUSCULAR; INTRAVENOUS; SUBCUTANEOUS ONCE
Qty: 0 | Refills: 0 | Status: COMPLETED | OUTPATIENT
Start: 2017-12-21 | End: 2017-12-21

## 2017-12-21 RX ADMIN — Medication 650 MILLIGRAM(S): at 15:47

## 2017-12-21 RX ADMIN — ONDANSETRON 4 MILLIGRAM(S): 8 TABLET, FILM COATED ORAL at 15:47

## 2017-12-21 RX ADMIN — SODIUM CHLORIDE 1000 MILLILITER(S): 9 INJECTION INTRAMUSCULAR; INTRAVENOUS; SUBCUTANEOUS at 17:48

## 2017-12-21 RX ADMIN — CEFTRIAXONE 100 GRAM(S): 500 INJECTION, POWDER, FOR SOLUTION INTRAMUSCULAR; INTRAVENOUS at 18:47

## 2017-12-21 NOTE — ED ADULT TRIAGE NOTE - CHIEF COMPLAINT QUOTE
pt reports she is about 8-10weeks pregnant, +home preg test, experiencing lots of back pains and dizziness, no vaginal bleeding. hasn't had BM in 3-4 days.

## 2017-12-21 NOTE — ED STATDOCS - OBJECTIVE STATEMENT
Pt is 33 y/o F presents to ED c/o of constant ABD pain and lower back pain x 1 week. LMP 2017 and is currently 8 weeks pregnant. Has not taken any pain medications. Additionally reports lightheadedness, which is associated with sitting down and mild constipation. Denies fever, vomiting dysuria and hematuria. Pt is non smoker and there are no known sick contacts at home. No further complaints at this time.   A9  PMD: Carmela Pt is 31 y/o woman who presents to ED c/o of constant ABD pain and lower back pain x 1 week. LMP 2017 and is currently 8 weeks pregnant.  She has not taken any medications for pain.  Associated symptoms include lightheadedness that worsens when sitting up.  She denies vaginal bleeding, fever, vomiting, dysuria and hematuria. Pt is non smoker and there are no known sick contacts at home. No further complaints at this time.   A9  PMD: Carmela

## 2017-12-21 NOTE — ED ADULT NURSE NOTE - OBJECTIVE STATEMENT
PT came in c/o dizziness, lightheadness, PT is 8 weeks pregnant.  3 living, 12 pregnancies total, 9 . LMP October

## 2017-12-21 NOTE — ED STATDOCS - ATTENDING CONTRIBUTION TO CARE
I, Ninfa Kelly, performed the initial face to face bedside interview with this patient regarding history of present illness, review of symptoms and relevant past medical, social and family history.  I completed an independent physical examination.  I was the initial provider who evaluated this patient.  The history, relevant review of systems, past medical and surgical history, medical decision making, and physical examination was documented by the scribe in my presence and I attest to the accuracy of the documentation.  I have signed out the follow up of any pending tests (i.e. labs, radiological studies) to the ACP.  I have communicated the patient’s plan of care and disposition with the ACP.

## 2018-01-28 ENCOUNTER — EMERGENCY (EMERGENCY)
Facility: HOSPITAL | Age: 33
LOS: 1 days | Discharge: HOME | End: 2018-01-28
Attending: STUDENT IN AN ORGANIZED HEALTH CARE EDUCATION/TRAINING PROGRAM | Admitting: EMERGENCY MEDICINE
Payer: COMMERCIAL

## 2018-01-28 VITALS
TEMPERATURE: 98 F | RESPIRATION RATE: 18 BRPM | OXYGEN SATURATION: 98 % | DIASTOLIC BLOOD PRESSURE: 87 MMHG | HEART RATE: 105 BPM | SYSTOLIC BLOOD PRESSURE: 144 MMHG

## 2018-01-28 VITALS
OXYGEN SATURATION: 98 % | SYSTOLIC BLOOD PRESSURE: 145 MMHG | WEIGHT: 151.9 LBS | TEMPERATURE: 98 F | HEIGHT: 67 IN | HEART RATE: 116 BPM | RESPIRATION RATE: 18 BRPM | DIASTOLIC BLOOD PRESSURE: 93 MMHG

## 2018-01-28 LAB
ALBUMIN SERPL ELPH-MCNC: 4 G/DL — SIGNIFICANT CHANGE UP (ref 3.3–5.2)
ALLERGY+IMMUNOLOGY DIAG STUDY NOTE: SIGNIFICANT CHANGE UP
ALP SERPL-CCNC: 52 U/L — SIGNIFICANT CHANGE UP (ref 40–120)
ALT FLD-CCNC: 16 U/L — SIGNIFICANT CHANGE UP
ANION GAP SERPL CALC-SCNC: 14 MMOL/L — SIGNIFICANT CHANGE UP (ref 5–17)
AST SERPL-CCNC: 15 U/L — SIGNIFICANT CHANGE UP
BASOPHILS # BLD AUTO: 0 K/UL — SIGNIFICANT CHANGE UP (ref 0–0.2)
BASOPHILS NFR BLD AUTO: 0.1 % — SIGNIFICANT CHANGE UP (ref 0–2)
BILIRUB SERPL-MCNC: 0.6 MG/DL — SIGNIFICANT CHANGE UP (ref 0.4–2)
BLD GP AB SCN SERPL QL: ABNORMAL
BUN SERPL-MCNC: 6 MG/DL — LOW (ref 8–20)
CALCIUM SERPL-MCNC: 9.2 MG/DL — SIGNIFICANT CHANGE UP (ref 8.6–10.2)
CHLORIDE SERPL-SCNC: 100 MMOL/L — SIGNIFICANT CHANGE UP (ref 98–107)
CO2 SERPL-SCNC: 23 MMOL/L — SIGNIFICANT CHANGE UP (ref 22–29)
CREAT SERPL-MCNC: 0.42 MG/DL — LOW (ref 0.5–1.3)
DIR ANTIGLOB POLYSPECIFIC INTERPRETATION: SIGNIFICANT CHANGE UP
EOSINOPHIL # BLD AUTO: 0 K/UL — SIGNIFICANT CHANGE UP (ref 0–0.5)
EOSINOPHIL NFR BLD AUTO: 0.2 % — SIGNIFICANT CHANGE UP (ref 0–6)
GLUCOSE SERPL-MCNC: 89 MG/DL — SIGNIFICANT CHANGE UP (ref 70–115)
HCG SERPL-ACNC: SIGNIFICANT CHANGE UP MIU/ML
HCT VFR BLD CALC: 33.1 % — LOW (ref 37–47)
HGB BLD-MCNC: 11.3 G/DL — LOW (ref 12–16)
LYMPHOCYTES # BLD AUTO: 1.8 K/UL — SIGNIFICANT CHANGE UP (ref 1–4.8)
LYMPHOCYTES # BLD AUTO: 16.7 % — LOW (ref 20–55)
MCHC RBC-ENTMCNC: 29.5 PG — SIGNIFICANT CHANGE UP (ref 27–31)
MCHC RBC-ENTMCNC: 34.1 G/DL — SIGNIFICANT CHANGE UP (ref 32–36)
MCV RBC AUTO: 86.4 FL — SIGNIFICANT CHANGE UP (ref 81–99)
MONOCYTES # BLD AUTO: 0.5 K/UL — SIGNIFICANT CHANGE UP (ref 0–0.8)
MONOCYTES NFR BLD AUTO: 5 % — SIGNIFICANT CHANGE UP (ref 3–10)
NEUTROPHILS # BLD AUTO: 8.2 K/UL — HIGH (ref 1.8–8)
NEUTROPHILS NFR BLD AUTO: 77.5 % — HIGH (ref 37–73)
PLATELET # BLD AUTO: 340 K/UL — SIGNIFICANT CHANGE UP (ref 150–400)
POTASSIUM SERPL-MCNC: 4 MMOL/L — SIGNIFICANT CHANGE UP (ref 3.5–5.3)
POTASSIUM SERPL-SCNC: 4 MMOL/L — SIGNIFICANT CHANGE UP (ref 3.5–5.3)
PROT SERPL-MCNC: 7.2 G/DL — SIGNIFICANT CHANGE UP (ref 6.6–8.7)
RBC # BLD: 3.83 M/UL — LOW (ref 4.4–5.2)
RBC # FLD: 13.8 % — SIGNIFICANT CHANGE UP (ref 11–15.6)
SODIUM SERPL-SCNC: 137 MMOL/L — SIGNIFICANT CHANGE UP (ref 135–145)
TYPE + AB SCN PNL BLD: SIGNIFICANT CHANGE UP
WBC # BLD: 10.6 K/UL — SIGNIFICANT CHANGE UP (ref 4.8–10.8)
WBC # FLD AUTO: 10.6 K/UL — SIGNIFICANT CHANGE UP (ref 4.8–10.8)

## 2018-01-28 PROCEDURE — 86901 BLOOD TYPING SEROLOGIC RH(D): CPT

## 2018-01-28 PROCEDURE — 84702 CHORIONIC GONADOTROPIN TEST: CPT

## 2018-01-28 PROCEDURE — 86870 RBC ANTIBODY IDENTIFICATION: CPT

## 2018-01-28 PROCEDURE — 86850 RBC ANTIBODY SCREEN: CPT

## 2018-01-28 PROCEDURE — 86900 BLOOD TYPING SEROLOGIC ABO: CPT

## 2018-01-28 PROCEDURE — 86880 COOMBS TEST DIRECT: CPT

## 2018-01-28 PROCEDURE — 99284 EMERGENCY DEPT VISIT MOD MDM: CPT

## 2018-01-28 PROCEDURE — 36415 COLL VENOUS BLD VENIPUNCTURE: CPT

## 2018-01-28 PROCEDURE — 85027 COMPLETE CBC AUTOMATED: CPT

## 2018-01-28 PROCEDURE — 76805 OB US >/= 14 WKS SNGL FETUS: CPT

## 2018-01-28 PROCEDURE — 76805 OB US >/= 14 WKS SNGL FETUS: CPT | Mod: 26

## 2018-01-28 PROCEDURE — 80053 COMPREHEN METABOLIC PANEL: CPT

## 2018-01-28 PROCEDURE — 99284 EMERGENCY DEPT VISIT MOD MDM: CPT | Mod: 25

## 2018-01-28 RX ORDER — ACETAMINOPHEN 500 MG
975 TABLET ORAL ONCE
Qty: 0 | Refills: 0 | Status: COMPLETED | OUTPATIENT
Start: 2018-01-28 | End: 2018-01-28

## 2018-01-28 RX ORDER — SODIUM CHLORIDE 9 MG/ML
1000 INJECTION INTRAMUSCULAR; INTRAVENOUS; SUBCUTANEOUS ONCE
Qty: 0 | Refills: 0 | Status: COMPLETED | OUTPATIENT
Start: 2018-01-28 | End: 2018-01-28

## 2018-01-28 RX ADMIN — Medication 975 MILLIGRAM(S): at 19:40

## 2018-01-28 RX ADMIN — SODIUM CHLORIDE 2000 MILLILITER(S): 9 INJECTION INTRAMUSCULAR; INTRAVENOUS; SUBCUTANEOUS at 19:40

## 2018-01-28 NOTE — ED ADULT TRIAGE NOTE - CHIEF COMPLAINT QUOTE
fell this morning in tub , lmp oct [id number] states 14 weeks pregnant, lower abd pain  and back pain , pain with walking in pelvic region, pt states vaginal  discharge present white color , urinary frequency more than normal for her , high risk pregnancy  hx of pre-eclampsia , has to go back to OBGYN next week for meds foe high BP

## 2018-01-28 NOTE — ED PROVIDER NOTE - MEDICAL DECISION MAKING DETAILS
at 14 weeks presents with abdominal cramping s/p a slip and fall without LOC in the bathroom at 3 am today. No vaginal bleeding. Will check labs, US, give tylenol for pain and IV hydration as patient is mildly tachycardic, which may be attributed to pain or dehydration as she has chronic nausea of pregnancy.

## 2018-01-28 NOTE — ED PROVIDER NOTE - NS ED ROS FT
Denies fever, chills, HA, dizziness, blurry vision, sore throat, coughing, SOB, CP, vomiting, flank pain, diarrhea, constipation, blood in stool, urinary frequency/urgency/dysuria, hematuria, LE edema, numbness, weakness or rashes.

## 2018-01-28 NOTE — ED ADULT NURSE NOTE - OBJECTIVE STATEMENT
pt comes to ED with reports of cramping to her abdomen s.p fall. reports she is 14 weeks pregnant. no vag bleeding, no other complaints. even and unlabored resps present. abd soft, non tender, non distended. no distress, pt ambultory

## 2018-01-28 NOTE — ED PROVIDER NOTE - MUSCULOSKELETAL MINIMAL EXAM
+ TTP low back. No midline tenderness to palpation, no palpable step offs, no deformity./atraumatic/TENDERNESS

## 2018-01-28 NOTE — ED PROVIDER NOTE - OBJECTIVE STATEMENT
presents at 14 weeks complaining of abdominal cramping s/p a fall in the bathroom around 3 am this morning. She states she is having tailbone pain and severe abdominal cramping which has persisted, but not worsened. She did not hit her head or lose consciousness. She denies vaginal bleeding or leakage of fluids. She states she is a high risk pregnancy due to hypertension this early in her pregnancy. She recently completed a course of ABx for a UTI. Her last US was last week and everything was normal. She has not taken anything for pain, denies allergies to medications. She notes baseline nausea, but denies vomiting.  OB: [place]

## 2018-01-28 NOTE — ED ADULT NURSE NOTE - CHIEF COMPLAINT QUOTE
fell this morning in tub , lmp oct [id number] states 14 weeks pregnant, lower abd pain  and back pain , pain with walking in pelvic region, pt states vaginal  discharge present white color , urinary frequency more than normal for her , high risk pregnancy  hx of pre-eclampsia , has to go back to OBGYN next week for meds foe high BP

## 2018-01-28 NOTE — ED PROVIDER NOTE - PROGRESS NOTE DETAILS
Patient signed out to Dr. Ruiz pending results. Patient stable. Awaiting labs and US for final disposition.

## 2018-02-21 ENCOUNTER — EMERGENCY (EMERGENCY)
Facility: HOSPITAL | Age: 33
LOS: 1 days | Discharge: LEFT BEFORE DISCHARGE | End: 2018-02-21
Admitting: EMERGENCY MEDICINE

## 2018-02-21 VITALS
RESPIRATION RATE: 26 BRPM | HEIGHT: 67 IN | TEMPERATURE: 99 F | OXYGEN SATURATION: 98 % | DIASTOLIC BLOOD PRESSURE: 94 MMHG | WEIGHT: 151.02 LBS | HEART RATE: 133 BPM | SYSTOLIC BLOOD PRESSURE: 150 MMHG
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
## 2018-04-06 ENCOUNTER — OUTPATIENT (OUTPATIENT)
Dept: OUTPATIENT SERVICES | Facility: HOSPITAL | Age: 33
LOS: 1 days | End: 2018-04-06
Payer: COMMERCIAL

## 2018-04-06 DIAGNOSIS — O47.02 FALSE LABOR BEFORE 37 COMPLETED WEEKS OF GESTATION, SECOND TRIMESTER: ICD-10-CM

## 2018-04-06 LAB
APPEARANCE UR: CLEAR — SIGNIFICANT CHANGE UP
BACTERIA # UR AUTO: ABNORMAL
BILIRUB UR-MCNC: NEGATIVE — SIGNIFICANT CHANGE UP
COLOR SPEC: YELLOW — SIGNIFICANT CHANGE UP
DIFF PNL FLD: NEGATIVE — SIGNIFICANT CHANGE UP
EPI CELLS # UR: SIGNIFICANT CHANGE UP
GLUCOSE UR QL: NEGATIVE MG/DL — SIGNIFICANT CHANGE UP
KETONES UR-MCNC: NEGATIVE — SIGNIFICANT CHANGE UP
LEUKOCYTE ESTERASE UR-ACNC: ABNORMAL
NITRITE UR-MCNC: POSITIVE
PH UR: 6.5 — SIGNIFICANT CHANGE UP (ref 5–8)
PROT UR-MCNC: NEGATIVE MG/DL — SIGNIFICANT CHANGE UP
RBC CASTS # UR COMP ASSIST: NEGATIVE /HPF — SIGNIFICANT CHANGE UP (ref 0–4)
SP GR SPEC: 1.01 — SIGNIFICANT CHANGE UP (ref 1.01–1.02)
UROBILINOGEN FLD QL: NEGATIVE MG/DL — SIGNIFICANT CHANGE UP
WBC UR QL: SIGNIFICANT CHANGE UP

## 2018-04-06 PROCEDURE — G0463: CPT

## 2018-04-06 PROCEDURE — 59025 FETAL NON-STRESS TEST: CPT

## 2018-04-06 PROCEDURE — 87186 SC STD MICRODIL/AGAR DIL: CPT

## 2018-04-06 PROCEDURE — 81001 URINALYSIS AUTO W/SCOPE: CPT

## 2018-04-06 PROCEDURE — 87086 URINE CULTURE/COLONY COUNT: CPT

## 2018-04-06 RX ORDER — SODIUM CHLORIDE 9 MG/ML
1000 INJECTION, SOLUTION INTRAVENOUS ONCE
Qty: 0 | Refills: 0 | Status: DISCONTINUED | OUTPATIENT
Start: 2018-04-06 | End: 2018-04-21

## 2018-04-06 RX ORDER — NITROFURANTOIN MACROCRYSTAL 50 MG
1 CAPSULE ORAL
Qty: 10 | Refills: 0 | OUTPATIENT
Start: 2018-04-06 | End: 2018-04-10

## 2018-05-22 NOTE — ED PROVIDER NOTE - TIMING
Had u/s this am: normal fetal growth, no need for follow up unless otherwise indicated. Having issues with reflux: will start Zantac 150 mg bid. Feeling much better this week than last week; will repeat hepatic function panel prior to next visit with me.  
intermittent

## 2018-06-25 NOTE — ED ADULT NURSE NOTE - CAS DISCH BELONGINGS RETURNED
Problem: Patient Care Overview  Goal: Plan of Care/Patient Progress Review  Outcome: Improving  VSS afebrile. Denies pain. Up amb in hallway. Tired from not sleeping well last night and anxious to get to bed this evening after bedtime snack.       Yes

## 2018-06-29 ENCOUNTER — OUTPATIENT (OUTPATIENT)
Dept: OUTPATIENT SERVICES | Facility: HOSPITAL | Age: 33
LOS: 1 days | End: 2018-06-29
Payer: COMMERCIAL

## 2018-06-29 DIAGNOSIS — O47.03 FALSE LABOR BEFORE 37 COMPLETED WEEKS OF GESTATION, THIRD TRIMESTER: ICD-10-CM

## 2018-06-29 LAB
APPEARANCE UR: CLEAR — SIGNIFICANT CHANGE UP
BILIRUB UR-MCNC: NEGATIVE — SIGNIFICANT CHANGE UP
COLOR SPEC: YELLOW — SIGNIFICANT CHANGE UP
DIFF PNL FLD: NEGATIVE — SIGNIFICANT CHANGE UP
EPI CELLS # UR: SIGNIFICANT CHANGE UP
GLUCOSE UR QL: NEGATIVE MG/DL — SIGNIFICANT CHANGE UP
KETONES UR-MCNC: ABNORMAL
LEUKOCYTE ESTERASE UR-ACNC: NEGATIVE — SIGNIFICANT CHANGE UP
NITRITE UR-MCNC: NEGATIVE — SIGNIFICANT CHANGE UP
PH UR: 6 — SIGNIFICANT CHANGE UP (ref 5–8)
PROT UR-MCNC: 15 MG/DL
SP GR SPEC: 1.01 — SIGNIFICANT CHANGE UP (ref 1.01–1.02)
UROBILINOGEN FLD QL: 1 MG/DL

## 2018-06-29 PROCEDURE — 87086 URINE CULTURE/COLONY COUNT: CPT

## 2018-06-29 PROCEDURE — G0463: CPT

## 2018-06-29 PROCEDURE — 81001 URINALYSIS AUTO W/SCOPE: CPT

## 2018-06-29 PROCEDURE — 59025 FETAL NON-STRESS TEST: CPT

## 2018-06-29 RX ORDER — ACETAMINOPHEN 500 MG
650 TABLET ORAL EVERY 6 HOURS
Qty: 0 | Refills: 0 | Status: DISCONTINUED | OUTPATIENT
Start: 2018-06-29 | End: 2018-07-14

## 2018-06-30 LAB
CULTURE RESULTS: SIGNIFICANT CHANGE UP
SPECIMEN SOURCE: SIGNIFICANT CHANGE UP

## 2018-08-02 NOTE — ED ADULT NURSE NOTE - GENITOURINARY WDL
Requested Prescriptions     Signed Prescriptions Disp Refills    metoprolol succinate (TOPROL-XL) 50 mg XL tablet 90 Tab 3     Sig: TAKE ONE TABLET BY MOUTH ONCE DAILY     Authorizing Provider: Fadi HEATH     Ordering User: TORITO Saleem    atorvastatin (LIPITOR) 10 mg tablet 90 Tab 3     Sig: TAKE ONE TABLET BY MOUTH ONCE DAILY     Authorizing Provider: Bridgette Amin     Ordering User: Lynette Mendoza     Verbal order per Sally Ricks with, NP. Yearly follow up with Dr. Surjit Enrique scheduled on 5-14-19.
Bladder non-tender and non-distended. Urine clear yellow.

## 2018-09-30 ENCOUNTER — INPATIENT (INPATIENT)
Facility: HOSPITAL | Age: 33
LOS: 2 days | Discharge: HOME | DRG: 871 | End: 2018-10-03
Attending: INTERNAL MEDICINE | Admitting: INTERNAL MEDICINE
Payer: COMMERCIAL

## 2018-09-30 VITALS — HEIGHT: 67 IN | WEIGHT: 154.98 LBS

## 2018-09-30 DIAGNOSIS — A41.9 SEPSIS, UNSPECIFIED ORGANISM: ICD-10-CM

## 2018-09-30 PROBLEM — I10 ESSENTIAL (PRIMARY) HYPERTENSION: Chronic | Status: ACTIVE | Noted: 2017-09-11

## 2018-09-30 LAB
ALBUMIN SERPL ELPH-MCNC: 4.7 G/DL — SIGNIFICANT CHANGE UP (ref 3.3–5.2)
ALP SERPL-CCNC: 102 U/L — SIGNIFICANT CHANGE UP (ref 40–120)
ALT FLD-CCNC: 14 U/L — SIGNIFICANT CHANGE UP
AMPHET UR-MCNC: NEGATIVE — SIGNIFICANT CHANGE UP
ANION GAP SERPL CALC-SCNC: 16 MMOL/L — SIGNIFICANT CHANGE UP (ref 5–17)
APPEARANCE UR: CLEAR — SIGNIFICANT CHANGE UP
APTT BLD: 32.1 SEC — SIGNIFICANT CHANGE UP (ref 27.5–37.4)
AST SERPL-CCNC: 18 U/L — SIGNIFICANT CHANGE UP
BARBITURATES UR SCN-MCNC: NEGATIVE — SIGNIFICANT CHANGE UP
BASOPHILS # BLD AUTO: 0 K/UL — SIGNIFICANT CHANGE UP (ref 0–0.2)
BASOPHILS NFR BLD AUTO: 0.2 % — SIGNIFICANT CHANGE UP (ref 0–2)
BENZODIAZ UR-MCNC: NEGATIVE — SIGNIFICANT CHANGE UP
BILIRUB SERPL-MCNC: 0.8 MG/DL — SIGNIFICANT CHANGE UP (ref 0.4–2)
BILIRUB UR-MCNC: NEGATIVE — SIGNIFICANT CHANGE UP
BUN SERPL-MCNC: 7 MG/DL — LOW (ref 8–20)
CALCIUM SERPL-MCNC: 9.4 MG/DL — SIGNIFICANT CHANGE UP (ref 8.6–10.2)
CHLORIDE SERPL-SCNC: 98 MMOL/L — SIGNIFICANT CHANGE UP (ref 98–107)
CO2 SERPL-SCNC: 22 MMOL/L — SIGNIFICANT CHANGE UP (ref 22–29)
COCAINE METAB.OTHER UR-MCNC: NEGATIVE — SIGNIFICANT CHANGE UP
COLOR SPEC: YELLOW — SIGNIFICANT CHANGE UP
CREAT SERPL-MCNC: 0.72 MG/DL — SIGNIFICANT CHANGE UP (ref 0.5–1.3)
DIFF PNL FLD: NEGATIVE — SIGNIFICANT CHANGE UP
EOSINOPHIL # BLD AUTO: 0 K/UL — SIGNIFICANT CHANGE UP (ref 0–0.5)
EOSINOPHIL NFR BLD AUTO: 0 % — SIGNIFICANT CHANGE UP (ref 0–6)
GLUCOSE SERPL-MCNC: 86 MG/DL — SIGNIFICANT CHANGE UP (ref 70–115)
GLUCOSE UR QL: NEGATIVE MG/DL — SIGNIFICANT CHANGE UP
HCG SERPL-ACNC: <5 MIU/ML — SIGNIFICANT CHANGE UP
HCT VFR BLD CALC: 39.2 % — SIGNIFICANT CHANGE UP (ref 37–47)
HGB BLD-MCNC: 12.2 G/DL — SIGNIFICANT CHANGE UP (ref 12–16)
INR BLD: 1.16 RATIO — SIGNIFICANT CHANGE UP (ref 0.88–1.16)
KETONES UR-MCNC: ABNORMAL
LACTATE BLDV-MCNC: 0.9 MMOL/L — SIGNIFICANT CHANGE UP (ref 0.5–2)
LEUKOCYTE ESTERASE UR-ACNC: ABNORMAL
LYMPHOCYTES # BLD AUTO: 1.3 K/UL — SIGNIFICANT CHANGE UP (ref 1–4.8)
LYMPHOCYTES # BLD AUTO: 14 % — LOW (ref 20–55)
MCHC RBC-ENTMCNC: 26 PG — LOW (ref 27–31)
MCHC RBC-ENTMCNC: 31.1 G/DL — LOW (ref 32–36)
MCV RBC AUTO: 83.4 FL — SIGNIFICANT CHANGE UP (ref 81–99)
METHADONE UR-MCNC: NEGATIVE — SIGNIFICANT CHANGE UP
MONOCYTES # BLD AUTO: 0.5 K/UL — SIGNIFICANT CHANGE UP (ref 0–0.8)
MONOCYTES NFR BLD AUTO: 5.5 % — SIGNIFICANT CHANGE UP (ref 3–10)
NEUTROPHILS # BLD AUTO: 7.6 K/UL — SIGNIFICANT CHANGE UP (ref 1.8–8)
NEUTROPHILS NFR BLD AUTO: 80.2 % — HIGH (ref 37–73)
NITRITE UR-MCNC: POSITIVE
OPIATES UR-MCNC: NEGATIVE — SIGNIFICANT CHANGE UP
PCP SPEC-MCNC: SIGNIFICANT CHANGE UP
PCP UR-MCNC: NEGATIVE — SIGNIFICANT CHANGE UP
PH UR: 6 — SIGNIFICANT CHANGE UP (ref 5–8)
PLATELET # BLD AUTO: 356 K/UL — SIGNIFICANT CHANGE UP (ref 150–400)
POTASSIUM SERPL-MCNC: 3.5 MMOL/L — SIGNIFICANT CHANGE UP (ref 3.5–5.3)
POTASSIUM SERPL-SCNC: 3.5 MMOL/L — SIGNIFICANT CHANGE UP (ref 3.5–5.3)
PROT SERPL-MCNC: 8 G/DL — SIGNIFICANT CHANGE UP (ref 6.6–8.7)
PROT UR-MCNC: NEGATIVE MG/DL — SIGNIFICANT CHANGE UP
PROTHROM AB SERPL-ACNC: 12.8 SEC — HIGH (ref 9.8–12.7)
RBC # BLD: 4.7 M/UL — SIGNIFICANT CHANGE UP (ref 4.4–5.2)
RBC # FLD: 17 % — HIGH (ref 11–15.6)
SODIUM SERPL-SCNC: 136 MMOL/L — SIGNIFICANT CHANGE UP (ref 135–145)
SP GR SPEC: 1 — LOW (ref 1.01–1.02)
THC UR QL: NEGATIVE — SIGNIFICANT CHANGE UP
UROBILINOGEN FLD QL: NEGATIVE MG/DL — SIGNIFICANT CHANGE UP
WBC # BLD: 9.5 K/UL — SIGNIFICANT CHANGE UP (ref 4.8–10.8)
WBC # FLD AUTO: 9.5 K/UL — SIGNIFICANT CHANGE UP (ref 4.8–10.8)

## 2018-09-30 PROCEDURE — 99223 1ST HOSP IP/OBS HIGH 75: CPT

## 2018-09-30 PROCEDURE — 71045 X-RAY EXAM CHEST 1 VIEW: CPT | Mod: 26

## 2018-09-30 PROCEDURE — 93010 ELECTROCARDIOGRAM REPORT: CPT

## 2018-09-30 PROCEDURE — 99291 CRITICAL CARE FIRST HOUR: CPT

## 2018-09-30 RX ORDER — ACETAMINOPHEN 500 MG
650 TABLET ORAL ONCE
Qty: 0 | Refills: 0 | Status: COMPLETED | OUTPATIENT
Start: 2018-09-30 | End: 2018-09-30

## 2018-09-30 RX ORDER — ACETAMINOPHEN 500 MG
325 TABLET ORAL ONCE
Qty: 0 | Refills: 0 | Status: COMPLETED | OUTPATIENT
Start: 2018-09-30 | End: 2018-09-30

## 2018-09-30 RX ORDER — SACCHAROMYCES BOULARDII 250 MG
250 POWDER IN PACKET (EA) ORAL
Qty: 0 | Refills: 0 | Status: DISCONTINUED | OUTPATIENT
Start: 2018-09-30 | End: 2018-10-03

## 2018-09-30 RX ORDER — CEFTRIAXONE 500 MG/1
1 INJECTION, POWDER, FOR SOLUTION INTRAMUSCULAR; INTRAVENOUS ONCE
Qty: 0 | Refills: 0 | Status: COMPLETED | OUTPATIENT
Start: 2018-09-30 | End: 2018-09-30

## 2018-09-30 RX ORDER — SODIUM CHLORIDE 9 MG/ML
1000 INJECTION INTRAMUSCULAR; INTRAVENOUS; SUBCUTANEOUS ONCE
Qty: 0 | Refills: 0 | Status: COMPLETED | OUTPATIENT
Start: 2018-09-30 | End: 2018-09-30

## 2018-09-30 RX ORDER — LABETALOL HCL 100 MG
5 TABLET ORAL ONCE
Qty: 0 | Refills: 0 | Status: COMPLETED | OUTPATIENT
Start: 2018-09-30 | End: 2018-09-30

## 2018-09-30 RX ORDER — KETOROLAC TROMETHAMINE 30 MG/ML
15 SYRINGE (ML) INJECTION ONCE
Qty: 0 | Refills: 0 | Status: DISCONTINUED | OUTPATIENT
Start: 2018-09-30 | End: 2018-09-30

## 2018-09-30 RX ORDER — CEFTRIAXONE 500 MG/1
1 INJECTION, POWDER, FOR SOLUTION INTRAMUSCULAR; INTRAVENOUS EVERY 24 HOURS
Qty: 0 | Refills: 0 | Status: DISCONTINUED | OUTPATIENT
Start: 2018-10-01 | End: 2018-10-01

## 2018-09-30 RX ORDER — LABETALOL HCL 100 MG
10 TABLET ORAL ONCE
Qty: 0 | Refills: 0 | Status: COMPLETED | OUTPATIENT
Start: 2018-09-30 | End: 2018-09-30

## 2018-09-30 RX ORDER — ONDANSETRON 8 MG/1
4 TABLET, FILM COATED ORAL EVERY 6 HOURS
Qty: 0 | Refills: 0 | Status: DISCONTINUED | OUTPATIENT
Start: 2018-09-30 | End: 2018-10-03

## 2018-09-30 RX ORDER — SODIUM CHLORIDE 9 MG/ML
1100 INJECTION INTRAMUSCULAR; INTRAVENOUS; SUBCUTANEOUS ONCE
Qty: 0 | Refills: 0 | Status: COMPLETED | OUTPATIENT
Start: 2018-09-30 | End: 2018-09-30

## 2018-09-30 RX ORDER — SODIUM CHLORIDE 9 MG/ML
1000 INJECTION INTRAMUSCULAR; INTRAVENOUS; SUBCUTANEOUS
Qty: 0 | Refills: 0 | Status: DISCONTINUED | OUTPATIENT
Start: 2018-09-30 | End: 2018-10-03

## 2018-09-30 RX ORDER — ENOXAPARIN SODIUM 100 MG/ML
40 INJECTION SUBCUTANEOUS EVERY 24 HOURS
Qty: 0 | Refills: 0 | Status: DISCONTINUED | OUTPATIENT
Start: 2018-09-30 | End: 2018-10-03

## 2018-09-30 RX ORDER — AZITHROMYCIN 500 MG/1
500 TABLET, FILM COATED ORAL EVERY 24 HOURS
Qty: 0 | Refills: 0 | Status: DISCONTINUED | OUTPATIENT
Start: 2018-10-01 | End: 2018-10-01

## 2018-09-30 RX ORDER — ACETAMINOPHEN 500 MG
650 TABLET ORAL EVERY 6 HOURS
Qty: 0 | Refills: 0 | Status: DISCONTINUED | OUTPATIENT
Start: 2018-09-30 | End: 2018-10-03

## 2018-09-30 RX ORDER — AZITHROMYCIN 500 MG/1
500 TABLET, FILM COATED ORAL ONCE
Qty: 0 | Refills: 0 | Status: COMPLETED | OUTPATIENT
Start: 2018-09-30 | End: 2018-09-30

## 2018-09-30 RX ADMIN — CEFTRIAXONE 100 GRAM(S): 500 INJECTION, POWDER, FOR SOLUTION INTRAMUSCULAR; INTRAVENOUS at 14:12

## 2018-09-30 RX ADMIN — Medication 325 MILLIGRAM(S): at 14:10

## 2018-09-30 RX ADMIN — Medication 650 MILLIGRAM(S): at 20:04

## 2018-09-30 RX ADMIN — AZITHROMYCIN 500 MILLIGRAM(S): 500 TABLET, FILM COATED ORAL at 15:08

## 2018-09-30 RX ADMIN — Medication 25 MILLIGRAM(S): at 15:45

## 2018-09-30 RX ADMIN — AZITHROMYCIN 255 MILLIGRAM(S): 500 TABLET, FILM COATED ORAL at 14:28

## 2018-09-30 RX ADMIN — Medication 10 MILLIGRAM(S): at 14:14

## 2018-09-30 RX ADMIN — Medication 5 MILLIGRAM(S): at 20:28

## 2018-09-30 RX ADMIN — SODIUM CHLORIDE 1000 MILLILITER(S): 9 INJECTION INTRAMUSCULAR; INTRAVENOUS; SUBCUTANEOUS at 14:28

## 2018-09-30 RX ADMIN — Medication 15 MILLIGRAM(S): at 20:28

## 2018-09-30 RX ADMIN — SODIUM CHLORIDE 1100 MILLILITER(S): 9 INJECTION INTRAMUSCULAR; INTRAVENOUS; SUBCUTANEOUS at 14:28

## 2018-09-30 RX ADMIN — Medication 650 MILLIGRAM(S): at 14:28

## 2018-09-30 RX ADMIN — CEFTRIAXONE 1 GRAM(S): 500 INJECTION, POWDER, FOR SOLUTION INTRAMUSCULAR; INTRAVENOUS at 14:27

## 2018-09-30 RX ADMIN — Medication 650 MILLIGRAM(S): at 14:12

## 2018-09-30 RX ADMIN — SODIUM CHLORIDE 1000 MILLILITER(S): 9 INJECTION INTRAMUSCULAR; INTRAVENOUS; SUBCUTANEOUS at 14:15

## 2018-09-30 RX ADMIN — SODIUM CHLORIDE 100 MILLILITER(S): 9 INJECTION INTRAMUSCULAR; INTRAVENOUS; SUBCUTANEOUS at 19:42

## 2018-09-30 RX ADMIN — SODIUM CHLORIDE 1100 MILLILITER(S): 9 INJECTION INTRAMUSCULAR; INTRAVENOUS; SUBCUTANEOUS at 14:12

## 2018-09-30 RX ADMIN — Medication 1 MILLIGRAM(S): at 14:15

## 2018-09-30 RX ADMIN — Medication 250 MILLIGRAM(S): at 20:38

## 2018-09-30 RX ADMIN — Medication 325 MILLIGRAM(S): at 14:28

## 2018-09-30 RX ADMIN — Medication 650 MILLIGRAM(S): at 19:43

## 2018-09-30 NOTE — ED ADULT NURSE REASSESSMENT NOTE - NS ED NURSE REASSESS COMMENT FT1
report given to 2guL RN, transport called, pt in no distress, IV site patent. even and unlabored resps.

## 2018-09-30 NOTE — ED ADULT NURSE REASSESSMENT NOTE - NS ED NURSE REASSESS COMMENT FT1
pt remains AOX3, reporting she is still feeling pain to her back. pt remains febrile, slightly hypertensive. house PA paged, awaiting call back.

## 2018-09-30 NOTE — ED ADULT NURSE NOTE - NS TRANSFER PATIENT BELONGINGS
Wrist Watch/Jewelry/Money (specify)/2 earing ,necklace, nose ring lip ring/Clothing/Cell Phone/PDA (specify)

## 2018-09-30 NOTE — ED STATDOCS - OBJECTIVE STATEMENT
Telemedicine assessment was conducted (using real time 2 way audio-video technology) by Dr. Og Hudson located at [address] [place] [address]  ++++++++++++++++++++++++  Pertinent patient history and initial plan:  33 yo female 2 months post partum with multiple complaints c/o lower back pain and lower abdominal pain with associated dysuria/frequency/urgency since last night with associated fever/chills.  +nausea, dizziness, chest pains.  No vomiting/diarrhea.  Fever last night 101.      patient is alert, awake, well appearing, comfortable    Plan: r/o sepsis, bloodwork, cultures, IV fluids, antipyretics        Patient seen by me in intake for initial assessment and ordering. Physician on site to follow results and further evaluate and treat patient.

## 2018-09-30 NOTE — H&P ADULT - HISTORY OF PRESENT ILLNESS
The patient is a 32 year old female with a history of pregnancy induced hypertension, anxiety, depression and alcohol use who presented to the ER with complaints of fever, chills and sweats. Her symptoms started last night and were associated with non productive cough and pain in the right side of her chest and back worse with deep inspiration. Also complaints of urinary frequency and dysuria for the past 2 days. In the ED, noted to have fever, tachycardia and tachypnea. Given a dose of iv rocephin and azithromycin for right lower lobe infiltrate on chest xray. Admitted for sepsis secondary to pneumonia. BP elevated on admission as well, patient takes no medications at home. Was on medicaitons for high blood pressure with her first pregnancy 15 years ago. Given a dose of iv labetalol in the ED.

## 2018-09-30 NOTE — H&P ADULT - ASSESSMENT
The patient is a 32 year old female with a history of pregnancy induced hypertension, anxiety, depression and alcohol use who presented to the ER with complaints of fever, chills and sweats. Her symptoms started last night and were associated with non productive cough and pain in the right side of her chest and back worse with deep inspiration. Also complaints of urinary frequency and dysuria for the past 2 days. In the ED, noted to have fever, tachycardia and tachypnea. Given a dose of iv rocephin and azithromycin for right lower lobe infiltrate on chest xray. Admitted for sepsis secondary to pneumonia. BP elevated on admission as well, patient takes no medications at home. Was on medicaitons for high blood pressure with her first pregnancy 15 years ago. Given a dose of iv labetalol in the ED.     Assessment/Plan:    1. Sepsis secondary to right lower lobe pneumonia: IV rocephin and azithromycin  Given 2 liters of NS bolus  Continue iv fluids NS at 100ml/hr  Blood cultures x 2  Given symptoms of dysuria and frequency check UA and urine culture as well  Start florastor    2. Elevated BP on admission; secondary to withdrawal? MOnitor bp> No history of hypertension will hold PO meds for now    3. Alcohol use: CIwa protocol; ativan PRN. Last drink was 2 days ago    4. History of anxiety and depression.     VTE_ lovenox subcut

## 2018-09-30 NOTE — ED PROVIDER NOTE - CARE PLAN
Principal Discharge DX:	Sepsis  Secondary Diagnosis:	Pneumonia  Secondary Diagnosis:	Hypertensive urgency

## 2018-09-30 NOTE — ED PROVIDER NOTE - OBJECTIVE STATEMENT
The patient is a 32 year old female presents with fever for 2 days with urinary symptoms of dysuria, frequency and urgency.  The patient also has drinking problem.

## 2018-09-30 NOTE — ED ADULT NURSE NOTE - NSIMPLEMENTINTERV_GEN_ALL_ED
Implemented All Universal Safety Interventions:  [place] to call system. Call bell, personal items and telephone within reach. Instruct patient to call for assistance. Room bathroom lighting operational. Non-slip footwear when patient is off stretcher. Physically safe environment: no spills, clutter or unnecessary equipment. Stretcher in lowest position, wheels locked, appropriate side rails in place.

## 2018-09-30 NOTE — ED PROVIDER NOTE - MEDICAL DECISION MAKING DETAILS
The patient presents with fever, tachycardic, elevated BP with tremor and will admit for further evaluation

## 2018-09-30 NOTE — ED ADULT NURSE REASSESSMENT NOTE - NS ED NURSE REASSESS COMMENT FT1
orders placed by dr nowak, covering hospitalist. will recheck BP prior to calling report to floor. pt AOX3, IVF infusing as ordered, even and unlabored resps, no distress, medicated for pain as well.

## 2018-10-01 ENCOUNTER — OUTPATIENT (OUTPATIENT)
Dept: OUTPATIENT SERVICES | Facility: HOSPITAL | Age: 33
LOS: 1 days | End: 2018-10-01
Payer: COMMERCIAL

## 2018-10-01 DIAGNOSIS — R78.81 BACTEREMIA: ICD-10-CM

## 2018-10-01 DIAGNOSIS — R10.31 RIGHT LOWER QUADRANT PAIN: ICD-10-CM

## 2018-10-01 DIAGNOSIS — J18.1 LOBAR PNEUMONIA, UNSPECIFIED ORGANISM: ICD-10-CM

## 2018-10-01 LAB
-  COAGULASE NEGATIVE STAPHYLOCOCCUS: SIGNIFICANT CHANGE UP
ANION GAP SERPL CALC-SCNC: 12 MMOL/L — SIGNIFICANT CHANGE UP (ref 5–17)
BUN SERPL-MCNC: 5 MG/DL — LOW (ref 8–20)
CALCIUM SERPL-MCNC: 8.2 MG/DL — LOW (ref 8.6–10.2)
CHLORIDE SERPL-SCNC: 106 MMOL/L — SIGNIFICANT CHANGE UP (ref 98–107)
CO2 SERPL-SCNC: 18 MMOL/L — LOW (ref 22–29)
CREAT SERPL-MCNC: 0.62 MG/DL — SIGNIFICANT CHANGE UP (ref 0.5–1.3)
CULTURE RESULTS: NO GROWTH — SIGNIFICANT CHANGE UP
GLUCOSE SERPL-MCNC: 87 MG/DL — SIGNIFICANT CHANGE UP (ref 70–115)
HCT VFR BLD CALC: 36.3 % — LOW (ref 37–47)
HGB BLD-MCNC: 11.5 G/DL — LOW (ref 12–16)
MCHC RBC-ENTMCNC: 26.1 PG — LOW (ref 27–31)
MCHC RBC-ENTMCNC: 31.7 G/DL — LOW (ref 32–36)
MCV RBC AUTO: 82.3 FL — SIGNIFICANT CHANGE UP (ref 81–99)
METHOD TYPE: SIGNIFICANT CHANGE UP
PLATELET # BLD AUTO: 258 K/UL — SIGNIFICANT CHANGE UP (ref 150–400)
POTASSIUM SERPL-MCNC: 4.4 MMOL/L — SIGNIFICANT CHANGE UP (ref 3.5–5.3)
POTASSIUM SERPL-SCNC: 4.4 MMOL/L — SIGNIFICANT CHANGE UP (ref 3.5–5.3)
RBC # BLD: 4.41 M/UL — SIGNIFICANT CHANGE UP (ref 4.4–5.2)
RBC # FLD: 17.2 % — HIGH (ref 11–15.6)
SODIUM SERPL-SCNC: 136 MMOL/L — SIGNIFICANT CHANGE UP (ref 135–145)
SPECIMEN SOURCE: SIGNIFICANT CHANGE UP
WBC # BLD: 10.3 K/UL — SIGNIFICANT CHANGE UP (ref 4.8–10.8)
WBC # FLD AUTO: 10.3 K/UL — SIGNIFICANT CHANGE UP (ref 4.8–10.8)

## 2018-10-01 PROCEDURE — 99222 1ST HOSP IP/OBS MODERATE 55: CPT

## 2018-10-01 PROCEDURE — 99233 SBSQ HOSP IP/OBS HIGH 50: CPT

## 2018-10-01 PROCEDURE — G9001: CPT

## 2018-10-01 RX ORDER — METRONIDAZOLE 500 MG
500 TABLET ORAL EVERY 8 HOURS
Qty: 0 | Refills: 0 | Status: DISCONTINUED | OUTPATIENT
Start: 2018-10-01 | End: 2018-10-03

## 2018-10-01 RX ORDER — IBUPROFEN 200 MG
400 TABLET ORAL ONCE
Qty: 0 | Refills: 0 | Status: COMPLETED | OUTPATIENT
Start: 2018-10-01 | End: 2018-10-01

## 2018-10-01 RX ORDER — INFLUENZA VIRUS VACCINE 15; 15; 15; 15 UG/.5ML; UG/.5ML; UG/.5ML; UG/.5ML
0.5 SUSPENSION INTRAMUSCULAR ONCE
Qty: 0 | Refills: 0 | Status: COMPLETED | OUTPATIENT
Start: 2018-10-01 | End: 2018-10-01

## 2018-10-01 RX ORDER — METRONIDAZOLE 500 MG
500 TABLET ORAL ONCE
Qty: 0 | Refills: 0 | Status: COMPLETED | OUTPATIENT
Start: 2018-10-01 | End: 2018-10-01

## 2018-10-01 RX ORDER — CEFTRIAXONE 500 MG/1
1 INJECTION, POWDER, FOR SOLUTION INTRAMUSCULAR; INTRAVENOUS EVERY 24 HOURS
Qty: 0 | Refills: 0 | Status: DISCONTINUED | OUTPATIENT
Start: 2018-10-01 | End: 2018-10-03

## 2018-10-01 RX ORDER — AMLODIPINE BESYLATE 2.5 MG/1
5 TABLET ORAL DAILY
Qty: 0 | Refills: 0 | Status: DISCONTINUED | OUTPATIENT
Start: 2018-10-01 | End: 2018-10-02

## 2018-10-01 RX ORDER — HYDRALAZINE HCL 50 MG
10 TABLET ORAL ONCE
Qty: 0 | Refills: 0 | Status: COMPLETED | OUTPATIENT
Start: 2018-10-01 | End: 2018-10-01

## 2018-10-01 RX ORDER — VANCOMYCIN HCL 1 G
1000 VIAL (EA) INTRAVENOUS EVERY 12 HOURS
Qty: 0 | Refills: 0 | Status: DISCONTINUED | OUTPATIENT
Start: 2018-10-01 | End: 2018-10-01

## 2018-10-01 RX ORDER — METRONIDAZOLE 500 MG
TABLET ORAL
Qty: 0 | Refills: 0 | Status: DISCONTINUED | OUTPATIENT
Start: 2018-10-01 | End: 2018-10-03

## 2018-10-01 RX ORDER — ACETAMINOPHEN 500 MG
650 TABLET ORAL EVERY 6 HOURS
Qty: 0 | Refills: 0 | Status: DISCONTINUED | OUTPATIENT
Start: 2018-10-01 | End: 2018-10-03

## 2018-10-01 RX ORDER — AZITHROMYCIN 500 MG/1
250 TABLET, FILM COATED ORAL EVERY 24 HOURS
Qty: 0 | Refills: 0 | Status: DISCONTINUED | OUTPATIENT
Start: 2018-10-01 | End: 2018-10-03

## 2018-10-01 RX ADMIN — AZITHROMYCIN 250 MILLIGRAM(S): 500 TABLET, FILM COATED ORAL at 13:09

## 2018-10-01 RX ADMIN — Medication 650 MILLIGRAM(S): at 07:38

## 2018-10-01 RX ADMIN — Medication 100 MILLIGRAM(S): at 13:09

## 2018-10-01 RX ADMIN — Medication 250 MILLIGRAM(S): at 07:35

## 2018-10-01 RX ADMIN — Medication 650 MILLIGRAM(S): at 23:56

## 2018-10-01 RX ADMIN — Medication 650 MILLIGRAM(S): at 13:48

## 2018-10-01 RX ADMIN — CEFTRIAXONE 100 GRAM(S): 500 INJECTION, POWDER, FOR SOLUTION INTRAMUSCULAR; INTRAVENOUS at 13:10

## 2018-10-01 RX ADMIN — Medication 10 MILLIGRAM(S): at 00:52

## 2018-10-01 RX ADMIN — Medication 650 MILLIGRAM(S): at 14:53

## 2018-10-01 RX ADMIN — AMLODIPINE BESYLATE 5 MILLIGRAM(S): 2.5 TABLET ORAL at 16:30

## 2018-10-01 RX ADMIN — ENOXAPARIN SODIUM 40 MILLIGRAM(S): 100 INJECTION SUBCUTANEOUS at 13:09

## 2018-10-01 RX ADMIN — Medication 100 MILLIGRAM(S): at 21:15

## 2018-10-01 RX ADMIN — Medication 250 MILLIGRAM(S): at 17:25

## 2018-10-01 RX ADMIN — Medication 400 MILLIGRAM(S): at 00:52

## 2018-10-01 NOTE — CONSULT NOTE ADULT - SUBJECTIVE AND OBJECTIVE BOX
[facility]  INFECTIOUS DISEASES AND INTERNAL MEDICINE at [place]  =======================================================  Arvin Rizo MD  Diplomates American Board of Internal Medicine and Infectious Diseases  =======================================================    [id number]  DEYA QUAN   This is a 32y  Female  with hx of leonardo-partum hypertension, anxiety, depression and alcohol use who presented to the ER with complaints of fever, chills and sweats.  She started feeling sick on 9/29/18. Non productive cough and pain in the right side of her chest and back worse with deep inspiration. Also complaints of urinary frequency and dysuria for the past 2 days. She also complained of lower right abd pain.   In the ED, noted to have fever, tachycardia and tachypnea. X ray showed right lower lobe infiltrate , Given a dose of iv rocephin and azithromycin for possible PNA and Admitted for sepsis secondary to pneumonia.     no sick contacts  no travel  lives in homeless shelter.   cough when eating solid food for many years     =======================================================  Past Medical & Surgical Hx:  =====================  PAST MEDICAL & SURGICAL HISTORY:  HTN (hypertension)  No significant past surgical history      Problem List:  ==========  HEALTH ISSUES - PROBLEM Dx:    Social Hx:  =======  no toxic habits currently    FAMILY HISTORY:  M - HTN  F- ESRD   no significant family history of immunosuppressive disorders in mother or father    Allergies    No Known Allergies    Intolerances        Antibiotics:  cefTRIAXone   IVPB 1 Gram(s) IV Intermittent every 24 hours  metroNIDAZOLE  IVPB      vancomycin  IVPB 1000 milliGRAM(s) IV Intermittent every 12 hours       azithromycin  IVPB   255 mL/Hr IV Intermittent (09-30-18 @ 14:28)    cefTRIAXone   IVPB   100 mL/Hr IV Intermittent (09-30-18 @ 14:12)       =======================================================  REVIEW OF SYSTEMS:  as above  all other ROS negative    ======================================================  Physical Exam:  ============  T(F): 101.5 (01 Oct 2018 07:37), Max: 102.7 (30 Sep 2018 13:35)  HR: 103 (01 Oct 2018 07:37)  BP: 139/94 (01 Oct 2018 07:37)  RR: 18 (01 Oct 2018 07:37)  SpO2: 98% (01 Oct 2018 07:37) (97% - 99%)  Height (cm): 170.18 (09-30-18 @ 13:12)  Weight (kg): 70.3 (09-30-18 @ 13:12)  BMI (kg/m2): 24.3 (09-30-18 @ 13:12)  BSA (m2): 1.81 (09-30-18 @ 13:12)    General:  No acute distress.  Eye: Pupils are equal, round and reactive to light, Extraocular movements are intact, Normal conjunctiva.  HENT: Normocephalic, Oral mucosa is moist, No pharyngeal erythema, No sinus tenderness.  Neck: Supple, No lymphadenopathy.  Respiratory: Bronchial breath sounds right base  Cardiovascular: Normal rate, Regular rhythm, No murmur, Good pulses equal in all extremities, No edema.  Gastrointestinal: Soft, Non-tender, Non-distended, Normal bowel sounds.  RIGHT costovertebral angle tenderness.  Lymphatics: No lymphadenopathy neck,   Musculoskeletal: Normal range of motion, Normal strength.  Integumentary: No rash.  Neurologic: Alert, Oriented, No focal deficits, Cranial Nerves II-XII are grossly intact.  Psychiatric: Appropriate mood & affect.      =======================================================  Labs:  ====  Labs:                        11.5   10.3  )-----------( 258      ( 01 Oct 2018 06:30 )             36.3     10-01    136  |  106  |  5.0<L>  ----------------------------<  87  4.4   |  18.0<L>  |  0.62    Ca    8.2<L>      01 Oct 2018 06:30    TPro  8.0  /  Alb  4.7  /  TBili  0.8  /  DBili  x   /  AST  18  /  ALT  14  /  AlkPhos  102  09-30        Culture - Blood (collected 09-30-18 @ 14:16)  Source: .Blood Blood  Organism: Blood Culture PCR (10-01-18 @ 11:49)  Organism: Blood Culture PCR (10-01-18 @ 11:49)    Sensitivities:      -  Coagulase negative Staphylococcus: Detec      Method Type: PCR

## 2018-10-01 NOTE — CONSULT NOTE ADULT - ASSESSMENT
This 32 y.o. F here for Right sided infiltrate, fevers, likely PNA.  Coughing when eating. ? aspiration  also with right lower abd pain, etiology unclear    - consider swallow eval  - check US of the abd to r/o infectious process  - add Flagyl for anaerobic coverage empirically.     RIGHT lobar PNA  - continue Ceftriaxone.  - if Legionella Ag negative, d/c azithromycin,   - check sputum culture    blood cx with Coag neg staph  - likely contaminant   repeat blood cx today, will not treat yet

## 2018-10-01 NOTE — PROGRESS NOTE ADULT - ASSESSMENT
The patient is a 32 year old female with a history of pregnancy induced hypertension, anxiety, depression and alcohol use who presented to the ER with complaints of fever, chills and sweats. Her symptoms started last night and were associated with non productive cough and pain in the right side of her chest and back worse with deep inspiration. Also complaints of urinary frequency and dysuria for the past 2 days. In the ED, noted to have fever, tachycardia and tachypnea. Given a dose of iv rocephin and azithromycin for right lower lobe infiltrate on chest xray. Admitted for sepsis secondary to pneumonia. BP elevated on admission as well, patient takes no medications at home. Was on medicaitons for high blood pressure with her first pregnancy 15 years ago. Given a dose of iv labetalol in the ED.     Assessment/Plan:    1. Sepsis secondary to right lower lobe pneumonia with +BC: IV rocephin and azithromycin  ID consulted  - continue Ceftriaxone.  - if Legionella Ag negative, d/c azithromycin,   - check sputum culture  blood cx with Coag neg staph  repeat blood cx today, will not treat yet      2. Elevated BP on admission; secondary to withdrawal? Add Norvasc.    3. Alcohol use: CIwa protocol; ativan PRN. Last drink was 2 days ago. Social work consulted    4. History of anxiety and depression.     VTE_ lovenox subcut The patient is a 32 year old female with a history of pregnancy induced hypertension, anxiety, depression and alcohol use who presented to the ER with complaints of fever, chills and sweats. Her symptoms started last night and were associated with non productive cough and pain in the right side of her chest and back worse with deep inspiration. Also complaints of urinary frequency and dysuria for the past 2 days. In the ED, noted to have fever, tachycardia and tachypnea. Given a dose of iv rocephin and azithromycin for right lower lobe infiltrate on chest xray. Admitted for sepsis secondary to pneumonia. BP elevated on admission as well, patient takes no medications at home. Was on medicaitons for high blood pressure with her first pregnancy 15 years ago. Given a dose of iv labetalol in the ED.     Assessment/Plan:    1. Sepsis secondary to right lower lobe pneumonia with +BC: IV rocephin and azithromycin  ID consulted  - if Legionella Ag negative, then  d/c azithromycin,   - check sputum culture  blood cx with Coag neg staph  repeat blood cx today, no treatment as per ID      2. Elevated BP on admission; secondary to withdrawal? Add Norvasc.    3. Alcohol use: CIwa protocol; ativan PRN. Last drink was 2 days ago. Social work consulted    4. Abdominal pain: US abdomen ordered. As per ID, Flagyl added for anaerobic coverage empirically.     5. History of anxiety and depression.     VTE_ lovenox subcut

## 2018-10-02 DIAGNOSIS — R50.9 FEVER, UNSPECIFIED: ICD-10-CM

## 2018-10-02 DIAGNOSIS — Z71.89 OTHER SPECIFIED COUNSELING: ICD-10-CM

## 2018-10-02 LAB
ANION GAP SERPL CALC-SCNC: 12 MMOL/L — SIGNIFICANT CHANGE UP (ref 5–17)
BUN SERPL-MCNC: 4 MG/DL — LOW (ref 8–20)
CALCIUM SERPL-MCNC: 8.8 MG/DL — SIGNIFICANT CHANGE UP (ref 8.6–10.2)
CHLORIDE SERPL-SCNC: 106 MMOL/L — SIGNIFICANT CHANGE UP (ref 98–107)
CO2 SERPL-SCNC: 20 MMOL/L — LOW (ref 22–29)
CREAT SERPL-MCNC: 0.57 MG/DL — SIGNIFICANT CHANGE UP (ref 0.5–1.3)
GLUCOSE SERPL-MCNC: 93 MG/DL — SIGNIFICANT CHANGE UP (ref 70–115)
HCT VFR BLD CALC: 36.3 % — LOW (ref 37–47)
HGB BLD-MCNC: 11.3 G/DL — LOW (ref 12–16)
HIV 1 & 2 AB SERPL IA.RAPID: SIGNIFICANT CHANGE UP
LEGIONELLA AG UR QL: NEGATIVE — SIGNIFICANT CHANGE UP
MCHC RBC-ENTMCNC: 25.5 PG — LOW (ref 27–31)
MCHC RBC-ENTMCNC: 31.1 G/DL — LOW (ref 32–36)
MCV RBC AUTO: 81.9 FL — SIGNIFICANT CHANGE UP (ref 81–99)
PLATELET # BLD AUTO: 268 K/UL — SIGNIFICANT CHANGE UP (ref 150–400)
POTASSIUM SERPL-MCNC: 3.4 MMOL/L — LOW (ref 3.5–5.3)
POTASSIUM SERPL-SCNC: 3.4 MMOL/L — LOW (ref 3.5–5.3)
RAPID RVP RESULT: SIGNIFICANT CHANGE UP
RBC # BLD: 4.43 M/UL — SIGNIFICANT CHANGE UP (ref 4.4–5.2)
RBC # FLD: 17.2 % — HIGH (ref 11–15.6)
SODIUM SERPL-SCNC: 138 MMOL/L — SIGNIFICANT CHANGE UP (ref 135–145)
WBC # BLD: 7.2 K/UL — SIGNIFICANT CHANGE UP (ref 4.8–10.8)
WBC # FLD AUTO: 7.2 K/UL — SIGNIFICANT CHANGE UP (ref 4.8–10.8)

## 2018-10-02 PROCEDURE — 99232 SBSQ HOSP IP/OBS MODERATE 35: CPT

## 2018-10-02 PROCEDURE — 76700 US EXAM ABDOM COMPLETE: CPT | Mod: 26

## 2018-10-02 PROCEDURE — 74177 CT ABD & PELVIS W/CONTRAST: CPT | Mod: 26

## 2018-10-02 PROCEDURE — 71260 CT THORAX DX C+: CPT | Mod: 26

## 2018-10-02 PROCEDURE — 99233 SBSQ HOSP IP/OBS HIGH 50: CPT

## 2018-10-02 RX ORDER — AMLODIPINE BESYLATE 2.5 MG/1
10 TABLET ORAL DAILY
Qty: 0 | Refills: 0 | Status: DISCONTINUED | OUTPATIENT
Start: 2018-10-03 | End: 2018-10-03

## 2018-10-02 RX ORDER — POTASSIUM CHLORIDE 20 MEQ
40 PACKET (EA) ORAL ONCE
Qty: 0 | Refills: 0 | Status: COMPLETED | OUTPATIENT
Start: 2018-10-02 | End: 2018-10-02

## 2018-10-02 RX ORDER — AMLODIPINE BESYLATE 2.5 MG/1
5 TABLET ORAL ONCE
Qty: 0 | Refills: 0 | Status: COMPLETED | OUTPATIENT
Start: 2018-10-02 | End: 2018-10-02

## 2018-10-02 RX ADMIN — Medication 100 MILLIGRAM(S): at 13:38

## 2018-10-02 RX ADMIN — Medication 650 MILLIGRAM(S): at 01:59

## 2018-10-02 RX ADMIN — ENOXAPARIN SODIUM 40 MILLIGRAM(S): 100 INJECTION SUBCUTANEOUS at 12:46

## 2018-10-02 RX ADMIN — Medication 650 MILLIGRAM(S): at 17:09

## 2018-10-02 RX ADMIN — Medication 250 MILLIGRAM(S): at 17:09

## 2018-10-02 RX ADMIN — SODIUM CHLORIDE 100 MILLILITER(S): 9 INJECTION INTRAMUSCULAR; INTRAVENOUS; SUBCUTANEOUS at 09:38

## 2018-10-02 RX ADMIN — SODIUM CHLORIDE 100 MILLILITER(S): 9 INJECTION INTRAMUSCULAR; INTRAVENOUS; SUBCUTANEOUS at 21:22

## 2018-10-02 RX ADMIN — Medication 650 MILLIGRAM(S): at 18:02

## 2018-10-02 RX ADMIN — Medication 250 MILLIGRAM(S): at 05:12

## 2018-10-02 RX ADMIN — CEFTRIAXONE 100 GRAM(S): 500 INJECTION, POWDER, FOR SOLUTION INTRAMUSCULAR; INTRAVENOUS at 12:46

## 2018-10-02 RX ADMIN — Medication 100 MILLIGRAM(S): at 21:29

## 2018-10-02 RX ADMIN — Medication 650 MILLIGRAM(S): at 09:38

## 2018-10-02 RX ADMIN — Medication 100 MILLIGRAM(S): at 05:12

## 2018-10-02 RX ADMIN — AMLODIPINE BESYLATE 5 MILLIGRAM(S): 2.5 TABLET ORAL at 09:37

## 2018-10-02 RX ADMIN — Medication 650 MILLIGRAM(S): at 08:52

## 2018-10-02 RX ADMIN — Medication 40 MILLIEQUIVALENT(S): at 08:53

## 2018-10-02 RX ADMIN — AMLODIPINE BESYLATE 5 MILLIGRAM(S): 2.5 TABLET ORAL at 05:12

## 2018-10-02 RX ADMIN — AZITHROMYCIN 250 MILLIGRAM(S): 500 TABLET, FILM COATED ORAL at 12:46

## 2018-10-02 NOTE — PROGRESS NOTE ADULT - ASSESSMENT
This 32 y.o. F here for Right sided infiltrate, fevers, likely PNA.  Coughing when eating. ? aspiration  also with right lower abd pain, etiology unclear    pending CT abdomen  - will follow    RIGHT lobar PNA  - continue Ceftriaxone.  - if Legionella Ag negative, d/c azithromycin,   - check sputum culture    blood cx with Coag neg staph  - likely contaminant   repeat blood cx today, will not treat yet

## 2018-10-02 NOTE — PROGRESS NOTE ADULT - ASSESSMENT
The patient is a 32 year old female with a history of pregnancy induced hypertension, anxiety, depression and alcohol use who presented to the ER with complaints of fever, chills and sweats, associated with non productive cough and pain in the right side of her chest and back worse with deep inspiration. Also complaints of urinary frequency and dysuria for 2 days prior to admission . In the ED, noted to have fever, tachycardia and tachypnea. Given a dose of iv rocephin and azithromycin for right lower lobe infiltrate on chest xray. Admitted for sepsis secondary to pneumonia. BP elevated on admission as well, patient takes no medications at home. Was on medicaitons for high blood pressure with her first pregnancy 15 years ago. Given a dose of iv labetalol in the ED and started on oral medications. The patient is  still febrile with blood cultures positive for gram positive cocci. ID consulted and repeat blood cultures were drawn on 10/1/18.     Assessment/Plan:    1. Sepsis secondary to right lower lobe pneumonia with +BC: IV rocephin and azithromycin with continued fever  ID consulted  - if Legionella Ag negative, then  d/c azithromycin,   - check sputum culture  blood cx with Coag neg staph  repeat blood cx 10/1, no treatment as per ID  CT ABD/Pelvis ordered  HIV test ordered      2. HTN: not at goal, increase Norvasc to 10 mg daily. Low sodium diet    3. Alcohol use: CIwa protocol; ativan PRN. Last drink was 2 days ago. Social work consulted    4. Abdominal pain: US abdomen reviewed.  As per ID, Flagyl added for anaerobic coverage empirically. CT ABD/Pelvis ordered.     5. History of anxiety and depression.     VTE_ lovenox subcut The patient is a 32 year old female with a history of pregnancy induced hypertension, anxiety, depression and alcohol use who presented to the ER with complaints of fever, chills and sweats, associated with non productive cough and pain in the right side of her chest and back worse with deep inspiration. Also complaints of urinary frequency and dysuria for 2 days prior to admission . In the ED, noted to have fever, tachycardia and tachypnea. Given a dose of iv rocephin and azithromycin for right lower lobe infiltrate on chest xray. Admitted for sepsis secondary to pneumonia. BP elevated on admission as well, patient takes no medications at home. Was on medicaitons for high blood pressure with her first pregnancy 15 years ago. Given a dose of iv labetalol in the ED and started on oral medications. The patient is  still febrile with blood cultures positive for gram positive cocci. ID consulted and repeat blood cultures were drawn on 10/1/18.     Assessment/Plan:    1. Sepsis secondary to right lower lobe pneumonia with +BC: IV rocephin and azithromycin with continued fever  ID consulted  - if Legionella Ag negative, then  d/c azithromycin,   - check sputum culture  blood cx with Coag neg staph  repeat blood cx 10/1, no treatment as per ID  CT Chest/ ABD/Pelvis ordered  HIV negative       2. HTN: not at goal, increase Norvasc to 10 mg daily. Low sodium diet    3. Alcohol use: CIwa protocol; ativan PRN. Last drink was 2 days prior to admission . Social work consulted    4. Abdominal pain: US abdomen reviewed.  As per ID, Flagyl added for anaerobic coverage empirically. CT ABD/Pelvis ordered.     5. History of anxiety and depression.     VTE_ lovenox subcut

## 2018-10-03 ENCOUNTER — TRANSCRIPTION ENCOUNTER (OUTPATIENT)
Age: 33
End: 2018-10-03

## 2018-10-03 VITALS
SYSTOLIC BLOOD PRESSURE: 142 MMHG | TEMPERATURE: 98 F | DIASTOLIC BLOOD PRESSURE: 97 MMHG | RESPIRATION RATE: 18 BRPM | HEART RATE: 99 BPM

## 2018-10-03 LAB
-  AMPICILLIN/SULBACTAM: SIGNIFICANT CHANGE UP
-  CEFAZOLIN: SIGNIFICANT CHANGE UP
-  CLINDAMYCIN: SIGNIFICANT CHANGE UP
-  ERYTHROMYCIN: SIGNIFICANT CHANGE UP
-  GENTAMICIN: SIGNIFICANT CHANGE UP
-  OXACILLIN: SIGNIFICANT CHANGE UP
-  PENICILLIN: SIGNIFICANT CHANGE UP
-  RIFAMPIN: SIGNIFICANT CHANGE UP
-  TETRACYCLINE: SIGNIFICANT CHANGE UP
-  TRIMETHOPRIM/SULFAMETHOXAZOLE: SIGNIFICANT CHANGE UP
-  VANCOMYCIN: SIGNIFICANT CHANGE UP
ANION GAP SERPL CALC-SCNC: 14 MMOL/L — SIGNIFICANT CHANGE UP (ref 5–17)
BUN SERPL-MCNC: 4 MG/DL — LOW (ref 8–20)
CALCIUM SERPL-MCNC: 9.1 MG/DL — SIGNIFICANT CHANGE UP (ref 8.6–10.2)
CHLORIDE SERPL-SCNC: 103 MMOL/L — SIGNIFICANT CHANGE UP (ref 98–107)
CO2 SERPL-SCNC: 22 MMOL/L — SIGNIFICANT CHANGE UP (ref 22–29)
CREAT SERPL-MCNC: 0.6 MG/DL — SIGNIFICANT CHANGE UP (ref 0.5–1.3)
GLUCOSE SERPL-MCNC: 93 MG/DL — SIGNIFICANT CHANGE UP (ref 70–115)
HCT VFR BLD CALC: 37.5 % — SIGNIFICANT CHANGE UP (ref 37–47)
HGB BLD-MCNC: 11.9 G/DL — LOW (ref 12–16)
MCHC RBC-ENTMCNC: 26 PG — LOW (ref 27–31)
MCHC RBC-ENTMCNC: 31.7 G/DL — LOW (ref 32–36)
MCV RBC AUTO: 82.1 FL — SIGNIFICANT CHANGE UP (ref 81–99)
METHOD TYPE: SIGNIFICANT CHANGE UP
PLATELET # BLD AUTO: 324 K/UL — SIGNIFICANT CHANGE UP (ref 150–400)
POTASSIUM SERPL-MCNC: 3.6 MMOL/L — SIGNIFICANT CHANGE UP (ref 3.5–5.3)
POTASSIUM SERPL-SCNC: 3.6 MMOL/L — SIGNIFICANT CHANGE UP (ref 3.5–5.3)
RBC # BLD: 4.57 M/UL — SIGNIFICANT CHANGE UP (ref 4.4–5.2)
RBC # FLD: 17.2 % — HIGH (ref 11–15.6)
SODIUM SERPL-SCNC: 139 MMOL/L — SIGNIFICANT CHANGE UP (ref 135–145)
WBC # BLD: 3.9 K/UL — LOW (ref 4.8–10.8)
WBC # FLD AUTO: 3.9 K/UL — LOW (ref 4.8–10.8)

## 2018-10-03 PROCEDURE — 83605 ASSAY OF LACTIC ACID: CPT

## 2018-10-03 PROCEDURE — 87040 BLOOD CULTURE FOR BACTERIA: CPT

## 2018-10-03 PROCEDURE — 87798 DETECT AGENT NOS DNA AMP: CPT

## 2018-10-03 PROCEDURE — 76700 US EXAM ABDOM COMPLETE: CPT

## 2018-10-03 PROCEDURE — 74177 CT ABD & PELVIS W/CONTRAST: CPT

## 2018-10-03 PROCEDURE — 81001 URINALYSIS AUTO W/SCOPE: CPT

## 2018-10-03 PROCEDURE — 87086 URINE CULTURE/COLONY COUNT: CPT

## 2018-10-03 PROCEDURE — 84702 CHORIONIC GONADOTROPIN TEST: CPT

## 2018-10-03 PROCEDURE — 80053 COMPREHEN METABOLIC PANEL: CPT

## 2018-10-03 PROCEDURE — 85610 PROTHROMBIN TIME: CPT

## 2018-10-03 PROCEDURE — 99291 CRITICAL CARE FIRST HOUR: CPT | Mod: 25

## 2018-10-03 PROCEDURE — 71260 CT THORAX DX C+: CPT

## 2018-10-03 PROCEDURE — 96375 TX/PRO/DX INJ NEW DRUG ADDON: CPT

## 2018-10-03 PROCEDURE — 87486 CHLMYD PNEUM DNA AMP PROBE: CPT

## 2018-10-03 PROCEDURE — 99232 SBSQ HOSP IP/OBS MODERATE 35: CPT

## 2018-10-03 PROCEDURE — 87449 NOS EACH ORGANISM AG IA: CPT

## 2018-10-03 PROCEDURE — 87633 RESP VIRUS 12-25 TARGETS: CPT

## 2018-10-03 PROCEDURE — 85027 COMPLETE CBC AUTOMATED: CPT

## 2018-10-03 PROCEDURE — 87150 DNA/RNA AMPLIFIED PROBE: CPT

## 2018-10-03 PROCEDURE — 36415 COLL VENOUS BLD VENIPUNCTURE: CPT

## 2018-10-03 PROCEDURE — 86703 HIV-1/HIV-2 1 RESULT ANTBDY: CPT

## 2018-10-03 PROCEDURE — 87581 M.PNEUMON DNA AMP PROBE: CPT

## 2018-10-03 PROCEDURE — 80048 BASIC METABOLIC PNL TOTAL CA: CPT

## 2018-10-03 PROCEDURE — 87186 SC STD MICRODIL/AGAR DIL: CPT

## 2018-10-03 PROCEDURE — 85730 THROMBOPLASTIN TIME PARTIAL: CPT

## 2018-10-03 PROCEDURE — 99239 HOSP IP/OBS DSCHRG MGMT >30: CPT

## 2018-10-03 PROCEDURE — 80307 DRUG TEST PRSMV CHEM ANLYZR: CPT

## 2018-10-03 PROCEDURE — 93005 ELECTROCARDIOGRAM TRACING: CPT

## 2018-10-03 PROCEDURE — 96365 THER/PROPH/DIAG IV INF INIT: CPT

## 2018-10-03 PROCEDURE — 71045 X-RAY EXAM CHEST 1 VIEW: CPT

## 2018-10-03 RX ORDER — METRONIDAZOLE 500 MG
1 TABLET ORAL
Qty: 21 | Refills: 0 | OUTPATIENT
Start: 2018-10-03 | End: 2018-10-09

## 2018-10-03 RX ORDER — ACETAMINOPHEN 500 MG
2 TABLET ORAL
Qty: 0 | Refills: 0 | COMMUNITY
Start: 2018-10-03

## 2018-10-03 RX ORDER — AMLODIPINE BESYLATE 2.5 MG/1
1 TABLET ORAL
Qty: 30 | Refills: 0 | OUTPATIENT
Start: 2018-10-03 | End: 2018-11-01

## 2018-10-03 RX ORDER — CEFPODOXIME PROXETIL 100 MG
1 TABLET ORAL
Qty: 14 | Refills: 0 | OUTPATIENT
Start: 2018-10-03 | End: 2018-10-09

## 2018-10-03 RX ORDER — METRONIDAZOLE 500 MG
500 TABLET ORAL EVERY 8 HOURS
Qty: 0 | Refills: 0 | Status: DISCONTINUED | OUTPATIENT
Start: 2018-10-03 | End: 2018-10-03

## 2018-10-03 RX ORDER — SACCHAROMYCES BOULARDII 250 MG
1 POWDER IN PACKET (EA) ORAL
Qty: 14 | Refills: 0 | OUTPATIENT
Start: 2018-10-03 | End: 2018-10-09

## 2018-10-03 RX ORDER — CEFPODOXIME PROXETIL 100 MG
200 TABLET ORAL EVERY 12 HOURS
Qty: 0 | Refills: 0 | Status: DISCONTINUED | OUTPATIENT
Start: 2018-10-03 | End: 2018-10-03

## 2018-10-03 RX ADMIN — Medication 100 MILLIGRAM(S): at 05:10

## 2018-10-03 RX ADMIN — Medication 500 MILLIGRAM(S): at 15:42

## 2018-10-03 RX ADMIN — Medication 250 MILLIGRAM(S): at 05:11

## 2018-10-03 RX ADMIN — AMLODIPINE BESYLATE 10 MILLIGRAM(S): 2.5 TABLET ORAL at 05:11

## 2018-10-03 RX ADMIN — ENOXAPARIN SODIUM 40 MILLIGRAM(S): 100 INJECTION SUBCUTANEOUS at 13:08

## 2018-10-03 NOTE — DISCHARGE NOTE ADULT - HOSPITAL COURSE
The patient is a 32 year old female with a history of pregnancy induced hypertension, anxiety, depression and alcohol use who presented to the ER with complaints of fever, chills and sweats, associated with non productive cough and pain in the right side of her chest and back worse with deep inspiration. Also complaints of urinary frequency and dysuria for 2 days prior to admission . In the ED, noted to have fever, tachycardia and tachypnea. Given a dose of iv rocephin and azithromycin for right lower lobe infiltrate on chest xray. Admitted for sepsis secondary to pneumonia. BP elevated on admission as well, patient takes no medications at home. Was on medicaitons for high blood pressure with her first pregnancy 15 years ago. Given a dose of iv labetalol in the ED and started on oral medications. The patient is  still febrile with blood cultures positive for gram positive cocci. ID consulted and repeat blood cultures were drawn on 10/1/18 were negative; Likely contaminant. CT of the chest/abdomen and pelvis confirmed significant right lower lobe infiltrate with evidence of cystitis. Spoke with ID, recommend 7 days of PO flagyl and vantin. Blood pressure improved with po norvasc 10mg, recommend follow up with [facility] for close monitoring or blood pressure and establishing care. Counselling on alcohol cessation and the risks of alcohol intake with flagyl. Patient understands the risks.  to set up discharge to emergency housing.       46 minutes spent coordinating care and discharge

## 2018-10-03 NOTE — PROGRESS NOTE ADULT - SUBJECTIVE AND OBJECTIVE BOX
[facility]  INFECTIOUS DISEASES AND INTERNAL MEDICINE at [place]  =======================================================  Arvin Rizo MD  Diplomates American Board of Internal Medicine and Infectious Diseases  =======================================================    DEYA QUAN [id number]  chief complaint: follow up on  abd pain and fevers  patient seen and examined in follow up.  Chart and labs reviewed.     still with fevers., repeat cultures sent.   pending CT abd  US of abd  without acute issues. possible GB polyps  =======================================================  Allergies:  No Known Allergies      =======================================================  Antibiotics:  azithromycin   Tablet 250 milliGRAM(s) Oral every 24 hours  cefTRIAXone   IVPB 1 Gram(s) IV Intermittent every 24 hours  metroNIDAZOLE  IVPB      metroNIDAZOLE  IVPB 500 milliGRAM(s) IV Intermittent every 8 hours    Other medications:  enoxaparin Injectable 40 milliGRAM(s) SubCutaneous every 24 hours  saccharomyces boulardii 250 milliGRAM(s) Oral two times a day  sodium chloride 0.9%. 1000 milliLiter(s) IV Continuous <Continuous>       =======================================================     REVIEW OF SYSTEMS:  as above  all other ROS are negative    =======================================================    Physical Exam:  T(F): 99.3 (02 Oct 2018 09:15), Max: 102.7 (30 Sep 2018 13:35)  HR: 104 (02 Oct 2018 09:15)  BP: 144/95 (02 Oct 2018 09:15)  RR: 18 (02 Oct 2018 09:15)  SpO2: 98% (02 Oct 2018 09:15) (97% - 99%)    GEN: NAD, pleasant  HEENT: normocephalic and atraumatic. EOMI. LAUREANO.    NECK: Supple. No carotid bruits.  No lymphadenopathy or thyromegaly.  LUNGS: Clear to auscultation.  HEART: Regular rate and rhythm    ABDOMEN: Soft, mild abd tenderess, nondistended.  Positive bowel sounds.    : No CVA tenderness  EXTREMITIES: Without any cyanosis, clubbing, rash, lesions or edema.  MSK: no joint swelling  NEUROLOGIC: Cranial nerves II through XII are grossly intact.  PSYCHIATRIC: Appropriate affect .  SKIN: No ulceration or induration present.    =======================================================  Labs:   Labs:                        11.3   7.2   )-----------( 268      ( 02 Oct 2018 07:32 )             36.3     10-02    138  |  106  |  4.0<L>  ----------------------------<  93  3.4<L>   |  20.0<L>  |  0.57    Ca    8.8      02 Oct 2018 07:32    TPro  8.0  /  Alb  4.7  /  TBili  0.8  /  DBili  x   /  AST  18  /  ALT  14  /  AlkPhos  102  09-30        Culture - Urine (collected 09-30-18 @ 16:45)  Source: .Urine Clean Catch (Midstream)  Final Report (10-01-18 @ 15:02):    No growth    Culture - Blood (collected 09-30-18 @ 14:16)  Source: .Blood Blood  Organism: Blood Culture PCR (10-01-18 @ 11:49)  Organism: Blood Culture PCR (10-01-18 @ 11:49)    Sensitivities:      -  Coagulase negative Staphylococcus: Detec      Method Type: PCR
CC: Fever     INTERVAL HPI/OVERNIGHT EVENTS: Patient seen and examined. Still running temps. Reports +cough, blood in urine. Denies chest pain, nausea, vomiting. C/O back pain.     Vital Signs Last 24 Hrs  T(C): 38.6 (01 Oct 2018 07:37), Max: 38.9 (30 Sep 2018 14:16)  T(F): 101.5 (01 Oct 2018 07:37), Max: 102 (30 Sep 2018 14:16)  HR: 103 (01 Oct 2018 07:37) (86 - 108)  BP: 139/94 (01 Oct 2018 07:37) (116/73 - 172/97)  BP(mean): --  RR: 18 (01 Oct 2018 07:37) (16 - 22)  SpO2: 98% (01 Oct 2018 07:) (97% - 99%)    PHYSICAL EXAM:    General: Well developed; well nourished; in no acute distress  Respiratory: No wheezes, rales or rhonchi, coarse BS right base  Cardiovascular: Regular rate and rhythm. S1 and S2 Normal; No murmurs, gallops or rubs  Gastrointestinal: Soft non-tender non-distended; Normal bowel sounds; No hepatosplenomegaly  Extremities: Normal range of motion, No clubbing, cyanosis or edema  Vascular: Peripheral pulses palpable 2+ bilaterally  Neurological: Alert and oriented x4  Musculoskeletal: Normal gait, tone, without deformities  Psychiatric: Cooperative and appropriate  I&O's Detail                                11.5   10.3  )-----------( 258      ( 01 Oct 2018 06:30 )             36.3     01 Oct 2018 06:30    136    |  106    |  5.0    ----------------------------<  87     4.4     |  18.0   |  0.62     Ca    8.2        01 Oct 2018 06:30    TPro  8.0    /  Alb  4.7    /  TBili  0.8    /  DBili  x      /  AST  18     /  ALT  14     /  AlkPhos  102    30 Sep 2018 14:15    PT/INR - ( 30 Sep 2018 14:15 )   PT: 12.8 sec;   INR: 1.16 ratio         PTT - ( 30 Sep 2018 14:15 )  PTT:32.1 sec  CAPILLARY BLOOD GLUCOSE        LIVER FUNCTIONS - ( 30 Sep 2018 14:15 )  Alb: 4.7 g/dL / Pro: 8.0 g/dL / ALK PHOS: 102 U/L / ALT: 14 U/L / AST: 18 U/L / GGT: x           Urinalysis Basic - ( 30 Sep 2018 16:44 )    Color: Yellow / Appearance: Clear / S.005 / pH: x  Gluc: x / Ketone: Small  / Bili: Negative / Urobili: Negative mg/dL   Blood: x / Protein: Negative mg/dL / Nitrite: Positive   Leuk Esterase: Trace / RBC: 0-2 /HPF / WBC 3-5   Sq Epi: x / Non Sq Epi: Occasional / Bacteria: Occasional        MEDICATIONS  (STANDING):  amLODIPine   Tablet 5 milliGRAM(s) Oral daily  azithromycin   Tablet 250 milliGRAM(s) Oral every 24 hours  cefTRIAXone   IVPB 1 Gram(s) IV Intermittent every 24 hours  enoxaparin Injectable 40 milliGRAM(s) SubCutaneous every 24 hours  influenza   Vaccine 0.5 milliLiter(s) IntraMuscular once  metroNIDAZOLE  IVPB      metroNIDAZOLE  IVPB 500 milliGRAM(s) IV Intermittent every 8 hours  saccharomyces boulardii 250 milliGRAM(s) Oral two times a day  sodium chloride 0.9%. 1000 milliLiter(s) (100 mL/Hr) IV Continuous <Continuous>    MEDICATIONS  (PRN):  acetaminophen   Tablet .. 650 milliGRAM(s) Oral every 6 hours PRN Temp greater or equal to 38C (100.4F)  acetaminophen   Tablet .. 650 milliGRAM(s) Oral every 6 hours PRN Moderate Pain (4 - 6)  LORazepam   Injectable 2 milliGRAM(s) IV Push every 2 hours PRN CIWA-Ar score increase by 2 points and a total score of 7 or less  ondansetron Injectable 4 milliGRAM(s) IV Push every 6 hours PRN Nausea      RADIOLOGY & ADDITIONAL TESTS:
CC: Fever    INTERVAL HPI/OVERNIGHT EVENTS: Patient seen and examined, afebrile now x 24 hours. Denies chills or sweats. Denies nausea or vomiting. Still has complaints of non productive cough, no shortness of breath or chest pain. Abdominal pain improved, no urinary symptoms.       Vital Signs Last 24 Hrs  T(C): 36.9 (03 Oct 2018 07:48), Max: 37.2 (02 Oct 2018 23:45)  T(F): 98.5 (03 Oct 2018 07:48), Max: 98.9 (02 Oct 2018 23:45)  HR: 99 (03 Oct 2018 07:48) (94 - 99)  BP: 132/87 (03 Oct 2018 07:48) (132/87 - 148/102)  BP(mean): --  RR: 16 (03 Oct 2018 07:48) (16 - 18)  SpO2: 97% (03 Oct 2018 07:48) (97% - 99%)    PHYSICAL EXAM:    GENERAL: NAD, AOX3  HEAD:  Atraumatic, Normocephalic  EYES: EOMI, PERRLA, conjunctiva and sclera clear  ENMT: Moist mucous membranes  CHEST/LUNG: Rhonchi right lung base   HEART: Regular rate and rhythm; No murmurs, rubs, or gallops  ABDOMEN: Soft, Nontender, Nondistended; Bowel sounds present  EXTREMITIES:  2+ Peripheral Pulses, No clubbing, cyanosis, or edema        MEDICATIONS  (STANDING):  amLODIPine   Tablet 10 milliGRAM(s) Oral daily  cefpodoxime 200 milliGRAM(s) Oral every 12 hours  enoxaparin Injectable 40 milliGRAM(s) SubCutaneous every 24 hours  metroNIDAZOLE    Tablet 500 milliGRAM(s) Oral every 8 hours  saccharomyces boulardii 250 milliGRAM(s) Oral two times a day    MEDICATIONS  (PRN):  acetaminophen   Tablet .. 650 milliGRAM(s) Oral every 6 hours PRN Temp greater or equal to 38C (100.4F)  acetaminophen   Tablet .. 650 milliGRAM(s) Oral every 6 hours PRN Moderate Pain (4 - 6)  LORazepam   Injectable 2 milliGRAM(s) IV Push every 2 hours PRN CIWA-Ar score increase by 2 points and a total score of 7 or less  ondansetron Injectable 4 milliGRAM(s) IV Push every 6 hours PRN Nausea      Allergies    No Known Allergies    Intolerances          LABS:                          11.9   3.9   )-----------( 324      ( 03 Oct 2018 08:02 )             37.5     10-03    139  |  103  |  4.0<L>  ----------------------------<  93  3.6   |  22.0  |  0.60    Ca    9.1      03 Oct 2018 08:02            RADIOLOGY & ADDITIONAL TESTS:
CC: Fever    INTERVAL HPI/OVERNIGHT EVENTS: Patient still with fever overnight. C/O head ache. States still having some blood in urine. C/O B/L pain at sides of waist. No flank pain. No nausea, vomiting, diarrhea.     Vital Signs Last 24 Hrs  T(C): 37.4 (02 Oct 2018 09:15), Max: 39.3 (01 Oct 2018 15:20)  T(F): 99.3 (02 Oct 2018 09:15), Max: 102.7 (01 Oct 2018 15:20)  HR: 104 (02 Oct 2018 09:15) (98 - 116)  BP: 144/95 (02 Oct 2018 09:15) (128/78 - 156/88)  BP(mean): --  RR: 18 (02 Oct 2018 09:15) (17 - 18)  SpO2: 98% (02 Oct 2018 09:15) (98% - 98%)    PHYSICAL EXAM:    General: Well developed; well nourished; in no acute distress  Respiratory: No wheezes, rales or rhonchi  Cardiovascular: Regular rate and rhythm. S1 and S2 Normal; No murmurs, gallops or rubs  Gastrointestinal: Soft non-tender non-distended; Normal bowel sounds; No hepatosplenomegaly  Extremities: Normal range of motion, No clubbing, cyanosis or edema  Vascular: Peripheral pulses palpable 2+ bilaterally  Neurological: Alert and oriented x4  Musculoskeletal: Normal gait, tone, without deformities  Psychiatric: Cooperative and appropriate  I&O's Detail    01 Oct 2018 07:01  -  02 Oct 2018 07:00  --------------------------------------------------------  IN:    sodium chloride 0.9%.: 800 mL  Total IN: 800 mL    OUT:    Voided: 1 mL  Total OUT: 1 mL    Total NET: 799 mL                                    11.3   7.2   )-----------( 268      ( 02 Oct 2018 07:32 )             36.3     02 Oct 2018 07:32    138    |  106    |  4.0    ----------------------------<  93     3.4     |  20.0   |  0.57     Ca    8.8        02 Oct 2018 07:32    TPro  8.0    /  Alb  4.7    /  TBili  0.8    /  DBili  x      /  AST  18     /  ALT  14     /  AlkPhos  102    30 Sep 2018 14:15    PT/INR - ( 30 Sep 2018 14:15 )   PT: 12.8 sec;   INR: 1.16 ratio         PTT - ( 30 Sep 2018 14:15 )  PTT:32.1 sec  CAPILLARY BLOOD GLUCOSE        LIVER FUNCTIONS - ( 30 Sep 2018 14:15 )  Alb: 4.7 g/dL / Pro: 8.0 g/dL / ALK PHOS: 102 U/L / ALT: 14 U/L / AST: 18 U/L / GGT: x           Urinalysis Basic - ( 30 Sep 2018 16:44 )    Color: Yellow / Appearance: Clear / S.005 / pH: x  Gluc: x / Ketone: Small  / Bili: Negative / Urobili: Negative mg/dL   Blood: x / Protein: Negative mg/dL / Nitrite: Positive   Leuk Esterase: Trace / RBC: 0-2 /HPF / WBC 3-5   Sq Epi: x / Non Sq Epi: Occasional / Bacteria: Occasional        MEDICATIONS  (STANDING):  azithromycin   Tablet 250 milliGRAM(s) Oral every 24 hours  cefTRIAXone   IVPB 1 Gram(s) IV Intermittent every 24 hours  enoxaparin Injectable 40 milliGRAM(s) SubCutaneous every 24 hours  metroNIDAZOLE  IVPB      metroNIDAZOLE  IVPB 500 milliGRAM(s) IV Intermittent every 8 hours  saccharomyces boulardii 250 milliGRAM(s) Oral two times a day  sodium chloride 0.9%. 1000 milliLiter(s) (100 mL/Hr) IV Continuous <Continuous>    MEDICATIONS  (PRN):  acetaminophen   Tablet .. 650 milliGRAM(s) Oral every 6 hours PRN Temp greater or equal to 38C (100.4F)  acetaminophen   Tablet .. 650 milliGRAM(s) Oral every 6 hours PRN Moderate Pain (4 - 6)  LORazepam   Injectable 2 milliGRAM(s) IV Push every 2 hours PRN CIWA-Ar score increase by 2 points and a total score of 7 or less  ondansetron Injectable 4 milliGRAM(s) IV Push every 6 hours PRN Nausea      RADIOLOGY & ADDITIONAL TESTS:
[facility]  INFECTIOUS DISEASES AND INTERNAL MEDICINE at [place]  =======================================================  Arvin Rizo MD  Diplomates American Board of Internal Medicine and Infectious Diseases  =======================================================    DEYA QUAN [id number]  chief complaint: follow up on  right elizabeth  patient seen and examined in follow up.  Chart and labs reviewed.   s/p CT chest / abd; reviewed  no new fevers overnight  =======================================================  Allergies:  No Known Allergies      =======================================================  Antibiotics:  cefpodoxime 200 milliGRAM(s) Oral every 12 hours  metroNIDAZOLE    Tablet 500 milliGRAM(s) Oral every 8 hours    =======================================================     REVIEW OF SYSTEMS:  as above  all other ROS are negative    =======================================================    Physical Exam:  T(F): 98.5 (03 Oct 2018 07:48), Max: 102.7 (01 Oct 2018 15:20)  HR: 99 (03 Oct 2018 07:48)  BP: 132/87 (03 Oct 2018 07:48)  BP(mean): --  RR: 16 (03 Oct 2018 07:48)  SpO2: 97% (03 Oct 2018 07:48) (97% - 99%)    GEN: NAD, pleasant  HEENT: normocephalic and atraumatic. EOMI. LAUREANO.    NECK: Supple. No carotid bruits.  No lymphadenopathy or thyromegaly.  LUNGS: Diminished air entry right base  HEART: Regular rate and rhythm    ABDOMEN: Soft, mild abd tenderness nondistended.  Positive bowel sounds.    : No CVA tenderness  EXTREMITIES: Without any cyanosis, clubbing, rash, lesions or edema.  MSK: no joint swelling  NEUROLOGIC: Cranial nerves II through XII are grossly intact.  PSYCHIATRIC: Appropriate affect .  SKIN: No ulceration or induration present.    =======================================================  Labs:                        11.3   7.2   )-----------( 268      ( 02 Oct 2018 07:32 )             36.3     10-03    139  |  103  |  4.0<L>  ----------------------------<  93  3.6   |  22.0  |  0.60    Ca    9.1      03 Oct 2018 08:02          Culture - Urine (collected 09-30-18 @ 16:45)  Source: .Urine Clean Catch (Midstream)  Final Report (10-01-18 @ 15:02):    No growth    Culture - Blood (collected 09-30-18 @ 14:17)  Source: .Blood Blood    Culture - Blood (collected 09-30-18 @ 14:16)  Source: .Blood Blood  Organism: Blood Culture PCR (10-01-18 @ 11:49)  Organism: Blood Culture PCR (10-01-18 @ 11:49)    Sensitivities:      -  Coagulase negative Staphylococcus: Detec      Method Type: PCR        =====    < from: CT Chest w/ IV Cont (10.02.18 @ 15:08) >    IMPRESSION:  Large right lower lobe infiltrate infiltrate was reported on   the chest x-ray of 9/30/2018.  Small right hilar lymph nodes are likely reactive  Mild diffuse bladder wall thickening may be compatible with cystitis in   the appropriate clinical setting.      < end of copied text >  .

## 2018-10-03 NOTE — PROGRESS NOTE ADULT - ASSESSMENT
This 32 y.o. F here for Right sided infiltrate, fevers, likely PNA.  Coughing when eating. ? aspiration  CT scan confirms initial CXR with large right sided PNA, which may lead to referred abd pain.     RIGHT lobar PNA  - negative legionella, stopped azithromycin  - can change to oral antibiotics today  x 7 more days   - Vantin 200mg PO BID and Flagyl 500mg PO Q8H    *** NO ETOH on FLAGYL ***    blood cx with Coag neg staph  - contaminant   - will no treat        no contraindications to discharge to community from ID standpoint

## 2018-10-03 NOTE — PROGRESS NOTE ADULT - ASSESSMENT
The patient is a 32 year old female with a history of pregnancy induced hypertension, anxiety, depression and alcohol use who presented to the ER with complaints of fever, chills and sweats, associated with non productive cough and pain in the right side of her chest and back worse with deep inspiration. Also complaints of urinary frequency and dysuria for 2 days prior to admission . In the ED, noted to have fever, tachycardia and tachypnea. Given a dose of iv rocephin and azithromycin for right lower lobe infiltrate on chest xray. Admitted for sepsis secondary to pneumonia. BP elevated on admission as well, patient takes no medications at home. Was on medicaitons for high blood pressure with her first pregnancy 15 years ago. Given a dose of iv labetalol in the ED and started on oral medications. The patient is  still febrile with blood cultures positive for gram positive cocci. ID consulted and repeat blood cultures were drawn on 10/1/18 were negative; Likely contaminant. CT of the chest/abdomen and pelvis confirmed significant right lower lobe infiltrate with evidence of cystitis. Spoke with ID, recommend 7 days of PO flagyl and vantin. Blood pressure improved with po norvasc 10mg, recommend follow up with [facility] for close monitoring or blood pressure and establishing care. Counselling on alcohol cessation and the risks of alcohol intake with flagyl. Patient understands the risks.  to set up discharge to emergency housing.     Assessment/Plan:    1. Sepsis secondary to right lower lobe pneumonia with +BC: Resolved  CT chest abdomen and pelvis confirmed right lower lobe pneumonia with cystitis: Spoke with ID recommend 7 days of po vantin and flagyl.       2. HTN: Improved on po norvasc 10mg OD. Will need to follow up with the [facility] for close monitoring.     3. Alcohol use: CIwa protocol; ativan PRN. Last drink was 2 days prior to admission . Social work consulted. Counselled on cessation.     4. . History of anxiety and depression.     VTE_ lovenox subcut       MEdically stable for discharge.

## 2018-10-03 NOTE — DISCHARGE NOTE ADULT - PLAN OF CARE
Resolved Secondary to pneumonia Complete 7 days of po vantin with flagyl as prescribed with a probiotic  Advised to return to the ER for worsening or recurrent symptoms Complete 7 days of po vantin PO norvasc 10mg OD  Advised daily blood pressure monitoring  follow up at the [facility] in 1- 2 weeks

## 2018-10-03 NOTE — DISCHARGE NOTE ADULT - CARE PLAN
Principal Discharge DX:	Sepsis, due to unspecified organism  Goal:	Resolved  Assessment and plan of treatment:	Secondary to pneumonia  Secondary Diagnosis:	Lobar pneumonia  Assessment and plan of treatment:	Complete 7 days of po vantin with flagyl as prescribed with a probiotic  Advised to return to the ER for worsening or recurrent symptoms  Secondary Diagnosis:	Cystitis  Assessment and plan of treatment:	Complete 7 days of po vantin  Secondary Diagnosis:	Hypertensive urgency  Assessment and plan of treatment:	PO norvasc 10mg OD  Advised daily blood pressure monitoring  follow up at the [facility] in 1- 2 weeks

## 2018-10-03 NOTE — DISCHARGE NOTE ADULT - CARE PROVIDER_API CALL
[facility],   [address] [facility], [place] [address] [phone]  Phone: (   )    -  Fax: (   )    -

## 2018-10-03 NOTE — DISCHARGE NOTE ADULT - MEDICATION SUMMARY - MEDICATIONS TO TAKE
I will START or STAY ON the medications listed below when I get home from the hospital:    metroNIDAZOLE 500 mg oral tablet  -- 1 tab(s) by mouth every 8 hours  -- Indication: For Pneumonia    acetaminophen 325 mg oral tablet  -- 2 tab(s) by mouth every 6 hours, As needed, Moderate Pain (4 - 6)  -- Indication: For As needed for pain or fever    amLODIPine 10 mg oral tablet  -- 1 tab(s) by mouth once a day  -- Indication: For Hypertension    cefpodoxime 200 mg oral tablet  -- 1 tab(s) by mouth every 12 hours  -- Indication: For Pneumonia    saccharomyces boulardii lyo 250 mg oral capsule  -- 1 cap(s) by mouth 2 times a day  -- Indication: For Probiotic

## 2018-10-03 NOTE — DISCHARGE NOTE ADULT - MEDICATION SUMMARY - MEDICATIONS TO STOP TAKING
I will STOP taking the medications listed below when I get home from the hospital:    Macrobid 100 mg oral capsule  -- 1 cap(s) by mouth 2 times a day MDD:2 tabs  -- Finish all this medication unless otherwise directed by prescriber.  May discolor urine or feces.  Take with food or milk.

## 2018-10-03 NOTE — DISCHARGE NOTE ADULT - PROVIDER TOKENS
FREE:[LAST:[[facility]],PHONE:[(   )    -],FAX:[(   )    -],ADDRESS:[[facility] [address], [place] [phone]]]

## 2018-10-03 NOTE — DISCHARGE NOTE ADULT - COMMUNITY RESOURCES
[facility]     OCT 11 ,2018 DR LEPE  2P   [address] [phone]

## 2018-10-04 ENCOUNTER — EMERGENCY (EMERGENCY)
Facility: HOSPITAL | Age: 33
LOS: 1 days | Discharge: HOME | End: 2018-10-04
Attending: EMERGENCY MEDICINE
Payer: COMMERCIAL

## 2018-10-04 VITALS
HEIGHT: 67 IN | HEART RATE: 82 BPM | SYSTOLIC BLOOD PRESSURE: 133 MMHG | DIASTOLIC BLOOD PRESSURE: 87 MMHG | OXYGEN SATURATION: 100 % | WEIGHT: 149.91 LBS | RESPIRATION RATE: 20 BRPM | TEMPERATURE: 99 F

## 2018-10-04 LAB
ALBUMIN SERPL ELPH-MCNC: 4.8 G/DL — SIGNIFICANT CHANGE UP (ref 3.3–5.2)
ALP SERPL-CCNC: 78 U/L — SIGNIFICANT CHANGE UP (ref 40–120)
ALT FLD-CCNC: 20 U/L — SIGNIFICANT CHANGE UP
ANION GAP SERPL CALC-SCNC: 14 MMOL/L — SIGNIFICANT CHANGE UP (ref 5–17)
APTT BLD: 32.8 SEC — SIGNIFICANT CHANGE UP (ref 27.5–37.4)
AST SERPL-CCNC: 26 U/L — SIGNIFICANT CHANGE UP
BILIRUB SERPL-MCNC: 0.3 MG/DL — LOW (ref 0.4–2)
BUN SERPL-MCNC: 10 MG/DL — SIGNIFICANT CHANGE UP (ref 8–20)
CALCIUM SERPL-MCNC: 10.1 MG/DL — SIGNIFICANT CHANGE UP (ref 8.6–10.2)
CHLORIDE SERPL-SCNC: 101 MMOL/L — SIGNIFICANT CHANGE UP (ref 98–107)
CO2 SERPL-SCNC: 26 MMOL/L — SIGNIFICANT CHANGE UP (ref 22–29)
CREAT SERPL-MCNC: 0.64 MG/DL — SIGNIFICANT CHANGE UP (ref 0.5–1.3)
GLUCOSE SERPL-MCNC: 91 MG/DL — SIGNIFICANT CHANGE UP (ref 70–115)
HCT VFR BLD CALC: 41.9 % — SIGNIFICANT CHANGE UP (ref 37–47)
HGB BLD-MCNC: 13.3 G/DL — SIGNIFICANT CHANGE UP (ref 12–16)
INR BLD: 1.12 RATIO — SIGNIFICANT CHANGE UP (ref 0.88–1.16)
MCHC RBC-ENTMCNC: 26.2 PG — LOW (ref 27–31)
MCHC RBC-ENTMCNC: 31.7 G/DL — LOW (ref 32–36)
MCV RBC AUTO: 82.6 FL — SIGNIFICANT CHANGE UP (ref 81–99)
PLATELET # BLD AUTO: 408 K/UL — HIGH (ref 150–400)
POTASSIUM SERPL-MCNC: 4 MMOL/L — SIGNIFICANT CHANGE UP (ref 3.5–5.3)
POTASSIUM SERPL-SCNC: 4 MMOL/L — SIGNIFICANT CHANGE UP (ref 3.5–5.3)
PROT SERPL-MCNC: 8.5 G/DL — SIGNIFICANT CHANGE UP (ref 6.6–8.7)
PROTHROM AB SERPL-ACNC: 12.4 SEC — SIGNIFICANT CHANGE UP (ref 9.8–12.7)
RBC # BLD: 5.07 M/UL — SIGNIFICANT CHANGE UP (ref 4.4–5.2)
RBC # FLD: 17.3 % — HIGH (ref 11–15.6)
SODIUM SERPL-SCNC: 141 MMOL/L — SIGNIFICANT CHANGE UP (ref 135–145)
TROPONIN T SERPL-MCNC: <0.01 NG/ML — SIGNIFICANT CHANGE UP (ref 0–0.06)
WBC # BLD: 4.6 K/UL — LOW (ref 4.8–10.8)
WBC # FLD AUTO: 4.6 K/UL — LOW (ref 4.8–10.8)

## 2018-10-04 PROCEDURE — 93971 EXTREMITY STUDY: CPT | Mod: 26,LT

## 2018-10-04 PROCEDURE — 71046 X-RAY EXAM CHEST 2 VIEWS: CPT | Mod: 26

## 2018-10-04 PROCEDURE — 99284 EMERGENCY DEPT VISIT MOD MDM: CPT

## 2018-10-04 NOTE — ED STATDOCS - OBJECTIVE STATEMENT
Telemedicine assessment was conducted (using real time 2 way audio-video technology) by David Grace MD located at [address] [place] [id number]  ++++++++++++++++++++++++  Pertinent patient history and initial plan: 32 y F c SOB, exertional.  Also c exertional dizziness.  Recent admission for PNA s/p abx.  D/C'd yesterday.  Also c LLE numbness and swelling at calf.  +CP at L anterior chest.  CP is pleuritic.  No f/c at home.   LMP now.  Not a smoker.  All symptoms onset after d/c from hospital.    NKDA  PMH -- HTN

## 2018-10-04 NOTE — ED PROVIDER NOTE - ATTENDING CONTRIBUTION TO CARE
33 yo F s/p reported admission for pneumonia 10/1 s/p d/c on 10/3 c/o SOB with pleuritic chest pain and calf pain.  Pt compliant with meds.  On exam afebrile in NAD.  Cor Reg, Lungs with slight decreased BS RLL, Abd soft, NT, Ext no edema, FROM, no calf tenderness or palp chords, Neuro intact.  Orders as PIT.  Labs and studies as per PIT and will re-eval

## 2018-10-04 NOTE — ED PROVIDER NOTE - OBJECTIVE STATEMENT
32 year female was recently admitted and treated for PNA.  states that she has been living in a shelter 32 year female was recently admitted and treated for PNA.  states that she has been living in a shelter lately due to housing difficulties. states that since being discharged yesterday she has been having CP, SOB, FAUST, and left calf pain. states that she has been taking her medications as directed. no hx of blood clots. denies hemoptysis. denies fever or chills .

## 2018-10-05 VITALS
DIASTOLIC BLOOD PRESSURE: 93 MMHG | HEART RATE: 87 BPM | OXYGEN SATURATION: 99 % | RESPIRATION RATE: 16 BRPM | TEMPERATURE: 97 F | SYSTOLIC BLOOD PRESSURE: 137 MMHG

## 2018-10-05 LAB
CULTURE RESULTS: SIGNIFICANT CHANGE UP
CULTURE RESULTS: SIGNIFICANT CHANGE UP
HCG SERPL-ACNC: <5 MIU/ML — SIGNIFICANT CHANGE UP
ORGANISM # SPEC MICROSCOPIC CNT: SIGNIFICANT CHANGE UP
SPECIMEN SOURCE: SIGNIFICANT CHANGE UP
SPECIMEN SOURCE: SIGNIFICANT CHANGE UP

## 2018-10-05 PROCEDURE — 93010 ELECTROCARDIOGRAM REPORT: CPT

## 2018-10-05 PROCEDURE — 71275 CT ANGIOGRAPHY CHEST: CPT

## 2018-10-05 PROCEDURE — 80053 COMPREHEN METABOLIC PANEL: CPT

## 2018-10-05 PROCEDURE — 93971 EXTREMITY STUDY: CPT

## 2018-10-05 PROCEDURE — 99284 EMERGENCY DEPT VISIT MOD MDM: CPT | Mod: 25

## 2018-10-05 PROCEDURE — 84484 ASSAY OF TROPONIN QUANT: CPT

## 2018-10-05 PROCEDURE — 85610 PROTHROMBIN TIME: CPT

## 2018-10-05 PROCEDURE — 71046 X-RAY EXAM CHEST 2 VIEWS: CPT

## 2018-10-05 PROCEDURE — 36415 COLL VENOUS BLD VENIPUNCTURE: CPT

## 2018-10-05 PROCEDURE — 85730 THROMBOPLASTIN TIME PARTIAL: CPT

## 2018-10-05 PROCEDURE — 85027 COMPLETE CBC AUTOMATED: CPT

## 2018-10-05 PROCEDURE — 84702 CHORIONIC GONADOTROPIN TEST: CPT

## 2018-10-05 PROCEDURE — 93005 ELECTROCARDIOGRAM TRACING: CPT

## 2018-10-05 PROCEDURE — 71275 CT ANGIOGRAPHY CHEST: CPT | Mod: 26

## 2018-10-05 NOTE — ED ADULT NURSE NOTE - NSIMPLEMENTINTERV_GEN_ALL_ED
Implemented All Universal Safety Interventions:  [place] to call system. Call bell, personal items and telephone within reach. Instruct patient to call for assistance. Room bathroom lighting operational. Non-slip footwear when patient is off stretcher. Physically safe environment: no spills, clutter or unnecessary equipment. Stretcher in lowest position, wheels locked, appropriate side rails in place.

## 2018-10-06 LAB
CULTURE RESULTS: SIGNIFICANT CHANGE UP
CULTURE RESULTS: SIGNIFICANT CHANGE UP
SPECIMEN SOURCE: SIGNIFICANT CHANGE UP
SPECIMEN SOURCE: SIGNIFICANT CHANGE UP
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
## 2020-07-10 NOTE — ED ADULT NURSE NOTE - RESPIRATORY WDL
New script will be sent for tablets   Breathing spontaneous and unlabored. Breath sounds clear and equal bilaterally with regular rhythm.

## 2020-07-12 ENCOUNTER — EMERGENCY (EMERGENCY)
Facility: HOSPITAL | Age: 35
LOS: 1 days | Discharge: HOME | End: 2020-07-12
Attending: EMERGENCY MEDICINE
Payer: COMMERCIAL

## 2020-07-12 VITALS
HEIGHT: 67 IN | RESPIRATION RATE: 18 BRPM | HEART RATE: 88 BPM | TEMPERATURE: 99 F | WEIGHT: 149.91 LBS | DIASTOLIC BLOOD PRESSURE: 88 MMHG | SYSTOLIC BLOOD PRESSURE: 146 MMHG | OXYGEN SATURATION: 98 %

## 2020-07-12 VITALS
TEMPERATURE: 98 F | OXYGEN SATURATION: 97 % | SYSTOLIC BLOOD PRESSURE: 142 MMHG | RESPIRATION RATE: 20 BRPM | DIASTOLIC BLOOD PRESSURE: 92 MMHG | HEART RATE: 67 BPM

## 2020-07-12 LAB
ALBUMIN SERPL ELPH-MCNC: 5 G/DL — SIGNIFICANT CHANGE UP (ref 3.3–5.2)
ALP SERPL-CCNC: 66 U/L — SIGNIFICANT CHANGE UP (ref 40–120)
ALT FLD-CCNC: 11 U/L — SIGNIFICANT CHANGE UP
ANION GAP SERPL CALC-SCNC: 13 MMOL/L — SIGNIFICANT CHANGE UP (ref 5–17)
APPEARANCE UR: CLEAR — SIGNIFICANT CHANGE UP
AST SERPL-CCNC: 20 U/L — SIGNIFICANT CHANGE UP
BACTERIA # UR AUTO: ABNORMAL
BASOPHILS # BLD AUTO: 0.04 K/UL — SIGNIFICANT CHANGE UP (ref 0–0.2)
BASOPHILS NFR BLD AUTO: 0.4 % — SIGNIFICANT CHANGE UP (ref 0–2)
BILIRUB SERPL-MCNC: 0.4 MG/DL — SIGNIFICANT CHANGE UP (ref 0.4–2)
BILIRUB UR-MCNC: NEGATIVE — SIGNIFICANT CHANGE UP
BUN SERPL-MCNC: 13 MG/DL — SIGNIFICANT CHANGE UP (ref 8–20)
CALCIUM SERPL-MCNC: 9.4 MG/DL — SIGNIFICANT CHANGE UP (ref 8.6–10.2)
CHLORIDE SERPL-SCNC: 101 MMOL/L — SIGNIFICANT CHANGE UP (ref 98–107)
CO2 SERPL-SCNC: 23 MMOL/L — SIGNIFICANT CHANGE UP (ref 22–29)
COLOR SPEC: YELLOW — SIGNIFICANT CHANGE UP
CREAT SERPL-MCNC: 0.76 MG/DL — SIGNIFICANT CHANGE UP (ref 0.5–1.3)
DIFF PNL FLD: NEGATIVE — SIGNIFICANT CHANGE UP
EOSINOPHIL # BLD AUTO: 0.05 K/UL — SIGNIFICANT CHANGE UP (ref 0–0.5)
EOSINOPHIL NFR BLD AUTO: 0.5 % — SIGNIFICANT CHANGE UP (ref 0–6)
EPI CELLS # UR: SIGNIFICANT CHANGE UP
GLUCOSE SERPL-MCNC: 89 MG/DL — SIGNIFICANT CHANGE UP (ref 70–99)
GLUCOSE UR QL: NEGATIVE — SIGNIFICANT CHANGE UP
HCG SERPL-ACNC: <4 MIU/ML — SIGNIFICANT CHANGE UP
HCT VFR BLD CALC: 37.6 % — SIGNIFICANT CHANGE UP (ref 34.5–45)
HGB BLD-MCNC: 12.5 G/DL — SIGNIFICANT CHANGE UP (ref 11.5–15.5)
IMM GRANULOCYTES NFR BLD AUTO: 0.3 % — SIGNIFICANT CHANGE UP (ref 0–1.5)
KETONES UR-MCNC: NEGATIVE — SIGNIFICANT CHANGE UP
LEUKOCYTE ESTERASE UR-ACNC: ABNORMAL
LIDOCAIN IGE QN: 28 U/L — SIGNIFICANT CHANGE UP (ref 22–51)
LYMPHOCYTES # BLD AUTO: 2.22 K/UL — SIGNIFICANT CHANGE UP (ref 1–3.3)
LYMPHOCYTES # BLD AUTO: 23.6 % — SIGNIFICANT CHANGE UP (ref 13–44)
MCHC RBC-ENTMCNC: 29.3 PG — SIGNIFICANT CHANGE UP (ref 27–34)
MCHC RBC-ENTMCNC: 33.2 GM/DL — SIGNIFICANT CHANGE UP (ref 32–36)
MCV RBC AUTO: 88.3 FL — SIGNIFICANT CHANGE UP (ref 80–100)
MONOCYTES # BLD AUTO: 0.34 K/UL — SIGNIFICANT CHANGE UP (ref 0–0.9)
MONOCYTES NFR BLD AUTO: 3.6 % — SIGNIFICANT CHANGE UP (ref 2–14)
NEUTROPHILS # BLD AUTO: 6.71 K/UL — SIGNIFICANT CHANGE UP (ref 1.8–7.4)
NEUTROPHILS NFR BLD AUTO: 71.6 % — SIGNIFICANT CHANGE UP (ref 43–77)
NITRITE UR-MCNC: POSITIVE
PH UR: 5 — SIGNIFICANT CHANGE UP (ref 5–8)
PLATELET # BLD AUTO: 338 K/UL — SIGNIFICANT CHANGE UP (ref 150–400)
POTASSIUM SERPL-MCNC: 4 MMOL/L — SIGNIFICANT CHANGE UP (ref 3.5–5.3)
POTASSIUM SERPL-SCNC: 4 MMOL/L — SIGNIFICANT CHANGE UP (ref 3.5–5.3)
PROT SERPL-MCNC: 8.1 G/DL — SIGNIFICANT CHANGE UP (ref 6.6–8.7)
PROT UR-MCNC: NEGATIVE — SIGNIFICANT CHANGE UP
RBC # BLD: 4.26 M/UL — SIGNIFICANT CHANGE UP (ref 3.8–5.2)
RBC # FLD: 13.6 % — SIGNIFICANT CHANGE UP (ref 10.3–14.5)
RBC CASTS # UR COMP ASSIST: SIGNIFICANT CHANGE UP /HPF (ref 0–4)
SODIUM SERPL-SCNC: 137 MMOL/L — SIGNIFICANT CHANGE UP (ref 135–145)
SP GR SPEC: 1.01 — SIGNIFICANT CHANGE UP (ref 1.01–1.02)
UROBILINOGEN FLD QL: NEGATIVE — SIGNIFICANT CHANGE UP
WBC # BLD: 9.39 K/UL — SIGNIFICANT CHANGE UP (ref 3.8–10.5)
WBC # FLD AUTO: 9.39 K/UL — SIGNIFICANT CHANGE UP (ref 3.8–10.5)
WBC UR QL: ABNORMAL

## 2020-07-12 PROCEDURE — 99285 EMERGENCY DEPT VISIT HI MDM: CPT

## 2020-07-12 PROCEDURE — 74177 CT ABD & PELVIS W/CONTRAST: CPT | Mod: 26

## 2020-07-12 RX ORDER — ONDANSETRON 8 MG/1
4 TABLET, FILM COATED ORAL ONCE
Refills: 0 | Status: COMPLETED | OUTPATIENT
Start: 2020-07-12 | End: 2020-07-12

## 2020-07-12 RX ORDER — SODIUM CHLORIDE 9 MG/ML
1000 INJECTION INTRAMUSCULAR; INTRAVENOUS; SUBCUTANEOUS ONCE
Refills: 0 | Status: COMPLETED | OUTPATIENT
Start: 2020-07-12 | End: 2020-07-12

## 2020-07-12 RX ORDER — MORPHINE SULFATE 50 MG/1
4 CAPSULE, EXTENDED RELEASE ORAL ONCE
Refills: 0 | Status: DISCONTINUED | OUTPATIENT
Start: 2020-07-12 | End: 2020-07-12

## 2020-07-12 RX ADMIN — MORPHINE SULFATE 4 MILLIGRAM(S): 50 CAPSULE, EXTENDED RELEASE ORAL at 20:40

## 2020-07-12 RX ADMIN — MORPHINE SULFATE 4 MILLIGRAM(S): 50 CAPSULE, EXTENDED RELEASE ORAL at 17:32

## 2020-07-12 RX ADMIN — SODIUM CHLORIDE 1000 MILLILITER(S): 9 INJECTION INTRAMUSCULAR; INTRAVENOUS; SUBCUTANEOUS at 17:32

## 2020-07-12 RX ADMIN — ONDANSETRON 4 MILLIGRAM(S): 8 TABLET, FILM COATED ORAL at 17:32

## 2020-07-12 NOTE — ED ADULT NURSE NOTE - NS ED NURSE IV DC DT
Clinic Care Coordination Contact    Situation: Patient chart reviewed by care coordinator.    Background: MH pt is managed through [facility] [facility], has MH CM, Psychiatry, therapy in place.    Assessment: Chronic/Persistantly MI.    Plan/Recommendations: Not open to [facility] services.    Lawanda Gruber [facility]  Clinic Care Coordinator-  Clinics: [place] [place], [place], [place]  E-Mail: [email or web address]  Telephone: [phone]     13-Jul-2020 00:31

## 2020-07-12 NOTE — ED ADULT NURSE NOTE - OBJECTIVE STATEMENT
Pt a&ox3 c/o constant LLQ pain 8/10 that radiates across abdomen with nausea.  Pt states she woke up with the pain.  Abdomen tender.  NAD noted.  Pt denies V/D, dysuria, hematuria, fever, SOB.  Plan of care explained, pt verbalized understanding.  Safety precautions in place.

## 2020-07-12 NOTE — ED STATDOCS - OBJECTIVE STATEMENT
33y/o F with PMHx of HTN presents to the ED c/o b/l lower abdominal pain which began suddenly this morning. Assoc. symptoms of nausea. Pt presented to  for sx, was recommended to f/u at ED to r/o acute Appy. She had BP monitored at  at 157/104. Pt is on Labetalol and admits to non compliance recently due to running out of medication. Pt is sexual active and reports she does not use contraceptives. LMP 1 month ago. Denies fever, CP, vomiting, chills, dysuria, hematuria or vaginal bleeding.

## 2020-07-12 NOTE — ED ADULT TRIAGE NOTE - CHIEF COMPLAINT QUOTE
Patient arrived to ED today with c/o abdominal pains since this AM with nausea and she feels dizziness and lightheadedness.

## 2020-07-12 NOTE — ED STATDOCS - PROGRESS NOTE DETAILS
PT evaluated by intake physician. HPI/PE/ROS as noted above. Will follow up plan per intake physician. reviewed ct results, lab work, urine, will tx pt for uti, pt explained results and d/c instructions

## 2020-07-12 NOTE — ED STATDOCS - CLINICAL SUMMARY MEDICAL DECISION MAKING FREE TEXT BOX
33y/o F p/w acute abdominal pain. Concern for Appendicitis vs Diverticulitis. Will obtain, labs, CT and reevaul.

## 2020-07-12 NOTE — ED STATDOCS - ATTENDING CONTRIBUTION TO CARE
I, Lonnie Posada, performed the initial face to face bedside interview with this patient regarding history of present illness, review of symptoms and relevant past medical, social and family history.  I completed an independent physical examination.  I was the initial provider who evaluated this patient. I have signed out the follow up of any pending tests (i.e. labs, radiological studies) to the ACP.  I have communicated the patient’s plan of care and disposition with the ACP.  The history, relevant review of systems, past medical and surgical history, medical decision making, and physical examination was documented by the scribe in my presence and I attest to the accuracy of the documentation.

## 2020-07-12 NOTE — ED STATDOCS - PATIENT PORTAL LINK FT
You can access the FollowMyHealth Patient Portal offered by [facility] by registering at the following website: [email or web address]. By joining [employer]’s FollowMyHealth portal, you will also be able to view your health information using other applications (apps) compatible with our system.

## 2020-07-13 PROCEDURE — 85027 COMPLETE CBC AUTOMATED: CPT

## 2020-07-13 PROCEDURE — 36415 COLL VENOUS BLD VENIPUNCTURE: CPT

## 2020-07-13 PROCEDURE — 81001 URINALYSIS AUTO W/SCOPE: CPT

## 2020-07-13 PROCEDURE — 74177 CT ABD & PELVIS W/CONTRAST: CPT

## 2020-07-13 PROCEDURE — 84702 CHORIONIC GONADOTROPIN TEST: CPT

## 2020-07-13 PROCEDURE — 96376 TX/PRO/DX INJ SAME DRUG ADON: CPT

## 2020-07-13 PROCEDURE — 96374 THER/PROPH/DIAG INJ IV PUSH: CPT | Mod: XU

## 2020-07-13 PROCEDURE — 83690 ASSAY OF LIPASE: CPT

## 2020-07-13 PROCEDURE — 80053 COMPREHEN METABOLIC PANEL: CPT

## 2020-07-13 PROCEDURE — 96375 TX/PRO/DX INJ NEW DRUG ADDON: CPT

## 2020-07-13 PROCEDURE — 99284 EMERGENCY DEPT VISIT MOD MDM: CPT | Mod: 25

## 2020-07-13 RX ORDER — CEPHALEXIN 500 MG
500 CAPSULE ORAL ONCE
Refills: 0 | Status: COMPLETED | OUTPATIENT
Start: 2020-07-13 | End: 2020-07-13

## 2020-07-13 RX ORDER — CEPHALEXIN 500 MG
1 CAPSULE ORAL
Qty: 28 | Refills: 0
Start: 2020-07-13 | End: 2020-07-19

## 2020-07-13 RX ADMIN — Medication 500 MILLIGRAM(S): at 00:30

## 2020-07-13 NOTE — ED ADULT NURSE REASSESSMENT NOTE - NS ED NURSE REASSESS COMMENT FT1
Pt. safe for discharge as per provider. Vital signs stable and as charted. Pt. at baseline neuro status. Discussed patient specific discharge teaching with pt, pt verbalized understanding. All questions answered. IV catheter discontinued. Pt. discharged as per orders.
Handoff received from offgoing RN. Charting as noted. Pt. received alert and oriented x 4, resting in stretcher, in NAD. Pt. reporting 7/10 abdominal pain at this time. Vital signs stable and as charted. Breathing spontaneous, unlabored, and symmetrical on room air. Skin warm, dry, normal for race. Fall Precautions maintained. Call bell within reach. Educated pt. on plan of care, pt verbalized understanding. Awaiting CT Abdomen & Pelvis at this time. Pt. safety and comfort measures maintained.

## 2020-09-11 ENCOUNTER — EMERGENCY (EMERGENCY)
Facility: HOSPITAL | Age: 35
LOS: 1 days | Discharge: OTHER INSTITUTION | End: 2020-09-11
Attending: EMERGENCY MEDICINE
Payer: COMMERCIAL

## 2020-09-11 VITALS
HEART RATE: 86 BPM | TEMPERATURE: 99 F | OXYGEN SATURATION: 97 % | WEIGHT: 149.91 LBS | HEIGHT: 67 IN | DIASTOLIC BLOOD PRESSURE: 106 MMHG | RESPIRATION RATE: 18 BRPM | SYSTOLIC BLOOD PRESSURE: 160 MMHG

## 2020-09-11 PROCEDURE — 99285 EMERGENCY DEPT VISIT HI MDM: CPT

## 2020-09-11 PROCEDURE — 90715 TDAP VACCINE 7 YRS/> IM: CPT

## 2020-09-11 PROCEDURE — 99285 EMERGENCY DEPT VISIT HI MDM: CPT | Mod: 25

## 2020-09-11 PROCEDURE — 90471 IMMUNIZATION ADMIN: CPT

## 2020-09-11 RX ORDER — OXYCODONE AND ACETAMINOPHEN 5; 325 MG/1; MG/1
2 TABLET ORAL ONCE
Refills: 0 | Status: DISCONTINUED | OUTPATIENT
Start: 2020-09-11 | End: 2020-09-11

## 2020-09-11 RX ORDER — TETANUS TOXOID, REDUCED DIPHTHERIA TOXOID AND ACELLULAR PERTUSSIS VACCINE, ADSORBED 5; 2.5; 8; 8; 2.5 [IU]/.5ML; [IU]/.5ML; UG/.5ML; UG/.5ML; UG/.5ML
0.5 SUSPENSION INTRAMUSCULAR ONCE
Refills: 0 | Status: COMPLETED | OUTPATIENT
Start: 2020-09-11 | End: 2020-09-11

## 2020-09-11 RX ADMIN — TETANUS TOXOID, REDUCED DIPHTHERIA TOXOID AND ACELLULAR PERTUSSIS VACCINE, ADSORBED 0.5 MILLILITER(S): 5; 2.5; 8; 8; 2.5 SUSPENSION INTRAMUSCULAR at 02:48

## 2020-09-11 RX ADMIN — OXYCODONE AND ACETAMINOPHEN 2 TABLET(S): 5; 325 TABLET ORAL at 02:48
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
## 2020-09-11 NOTE — ED PROVIDER NOTE - PHYSICAL EXAMINATION
Gen: WD/WN, NAD, non-toxic  Head: NCAT  Cardiac: RRR +S1S2.   Resp: CTAB  Abd: +BS x 4. Soft, non-tender, non-distended. No guarding or rebound.  MSK: R hand: No deformity or swelling. Pt able to move all digits and make fist. Diffusely ttp over hand and digits. Cap refill<2sec, radial pulses 2+ b/l  Skin: No erythema, induration or blisters.   Neuro: Awake, alert & oriented x 3. No focal deficits, sensorimotor intact x 4

## 2020-09-14 ENCOUNTER — EMERGENCY (EMERGENCY)
Facility: HOSPITAL | Age: 35
LOS: 1 days | Discharge: HOME | End: 2020-09-14
Attending: EMERGENCY MEDICINE
Payer: COMMERCIAL

## 2020-09-14 VITALS
SYSTOLIC BLOOD PRESSURE: 177 MMHG | RESPIRATION RATE: 16 BRPM | TEMPERATURE: 98 F | DIASTOLIC BLOOD PRESSURE: 90 MMHG | OXYGEN SATURATION: 100 % | HEART RATE: 87 BPM

## 2020-09-14 VITALS
DIASTOLIC BLOOD PRESSURE: 134 MMHG | TEMPERATURE: 98 F | RESPIRATION RATE: 16 BRPM | HEART RATE: 93 BPM | OXYGEN SATURATION: 99 % | SYSTOLIC BLOOD PRESSURE: 178 MMHG | HEIGHT: 67 IN | WEIGHT: 147.93 LBS

## 2020-09-14 LAB
ALBUMIN SERPL ELPH-MCNC: 4.2 G/DL — SIGNIFICANT CHANGE UP (ref 3.3–5.2)
ALP SERPL-CCNC: 63 U/L — SIGNIFICANT CHANGE UP (ref 40–120)
ALT FLD-CCNC: 12 U/L — SIGNIFICANT CHANGE UP
ANION GAP SERPL CALC-SCNC: 12 MMOL/L — SIGNIFICANT CHANGE UP (ref 5–17)
AST SERPL-CCNC: 18 U/L — SIGNIFICANT CHANGE UP
BASOPHILS # BLD AUTO: 0.04 K/UL — SIGNIFICANT CHANGE UP (ref 0–0.2)
BASOPHILS NFR BLD AUTO: 0.5 % — SIGNIFICANT CHANGE UP (ref 0–2)
BILIRUB SERPL-MCNC: 0.3 MG/DL — LOW (ref 0.4–2)
BUN SERPL-MCNC: 10 MG/DL — SIGNIFICANT CHANGE UP (ref 8–20)
CALCIUM SERPL-MCNC: 9.5 MG/DL — SIGNIFICANT CHANGE UP (ref 8.6–10.2)
CHLORIDE SERPL-SCNC: 101 MMOL/L — SIGNIFICANT CHANGE UP (ref 98–107)
CO2 SERPL-SCNC: 25 MMOL/L — SIGNIFICANT CHANGE UP (ref 22–29)
CREAT SERPL-MCNC: 0.8 MG/DL — SIGNIFICANT CHANGE UP (ref 0.5–1.3)
EOSINOPHIL # BLD AUTO: 0.04 K/UL — SIGNIFICANT CHANGE UP (ref 0–0.5)
EOSINOPHIL NFR BLD AUTO: 0.5 % — SIGNIFICANT CHANGE UP (ref 0–6)
GLUCOSE SERPL-MCNC: 93 MG/DL — SIGNIFICANT CHANGE UP (ref 70–99)
HCG UR QL: NEGATIVE — SIGNIFICANT CHANGE UP
HCT VFR BLD CALC: 35.8 % — SIGNIFICANT CHANGE UP (ref 34.5–45)
HGB BLD-MCNC: 12 G/DL — SIGNIFICANT CHANGE UP (ref 11.5–15.5)
IMM GRANULOCYTES NFR BLD AUTO: 0.3 % — SIGNIFICANT CHANGE UP (ref 0–1.5)
LYMPHOCYTES # BLD AUTO: 1.71 K/UL — SIGNIFICANT CHANGE UP (ref 1–3.3)
LYMPHOCYTES # BLD AUTO: 21.7 % — SIGNIFICANT CHANGE UP (ref 13–44)
MAGNESIUM SERPL-MCNC: 2.2 MG/DL — SIGNIFICANT CHANGE UP (ref 1.6–2.6)
MCHC RBC-ENTMCNC: 29.8 PG — SIGNIFICANT CHANGE UP (ref 27–34)
MCHC RBC-ENTMCNC: 33.5 GM/DL — SIGNIFICANT CHANGE UP (ref 32–36)
MCV RBC AUTO: 88.8 FL — SIGNIFICANT CHANGE UP (ref 80–100)
MONOCYTES # BLD AUTO: 0.52 K/UL — SIGNIFICANT CHANGE UP (ref 0–0.9)
MONOCYTES NFR BLD AUTO: 6.6 % — SIGNIFICANT CHANGE UP (ref 2–14)
NEUTROPHILS # BLD AUTO: 5.56 K/UL — SIGNIFICANT CHANGE UP (ref 1.8–7.4)
NEUTROPHILS NFR BLD AUTO: 70.4 % — SIGNIFICANT CHANGE UP (ref 43–77)
PHOSPHATE SERPL-MCNC: 3.7 MG/DL — SIGNIFICANT CHANGE UP (ref 2.4–4.7)
PLATELET # BLD AUTO: 334 K/UL — SIGNIFICANT CHANGE UP (ref 150–400)
POTASSIUM SERPL-MCNC: 4.3 MMOL/L — SIGNIFICANT CHANGE UP (ref 3.5–5.3)
POTASSIUM SERPL-SCNC: 4.3 MMOL/L — SIGNIFICANT CHANGE UP (ref 3.5–5.3)
PROT SERPL-MCNC: 8 G/DL — SIGNIFICANT CHANGE UP (ref 6.6–8.7)
RBC # BLD: 4.03 M/UL — SIGNIFICANT CHANGE UP (ref 3.8–5.2)
RBC # FLD: 13.9 % — SIGNIFICANT CHANGE UP (ref 10.3–14.5)
SODIUM SERPL-SCNC: 138 MMOL/L — SIGNIFICANT CHANGE UP (ref 135–145)
TSH SERPL-MCNC: 1.02 UIU/ML — SIGNIFICANT CHANGE UP (ref 0.27–4.2)
WBC # BLD: 7.89 K/UL — SIGNIFICANT CHANGE UP (ref 3.8–10.5)
WBC # FLD AUTO: 7.89 K/UL — SIGNIFICANT CHANGE UP (ref 3.8–10.5)

## 2020-09-14 PROCEDURE — 83735 ASSAY OF MAGNESIUM: CPT

## 2020-09-14 PROCEDURE — 84100 ASSAY OF PHOSPHORUS: CPT

## 2020-09-14 PROCEDURE — 99283 EMERGENCY DEPT VISIT LOW MDM: CPT

## 2020-09-14 PROCEDURE — 99284 EMERGENCY DEPT VISIT MOD MDM: CPT

## 2020-09-14 PROCEDURE — 80053 COMPREHEN METABOLIC PANEL: CPT

## 2020-09-14 PROCEDURE — 85025 COMPLETE CBC W/AUTO DIFF WBC: CPT

## 2020-09-14 PROCEDURE — 81025 URINE PREGNANCY TEST: CPT

## 2020-09-14 PROCEDURE — 93005 ELECTROCARDIOGRAM TRACING: CPT

## 2020-09-14 PROCEDURE — 36415 COLL VENOUS BLD VENIPUNCTURE: CPT

## 2020-09-14 PROCEDURE — 93010 ELECTROCARDIOGRAM REPORT: CPT

## 2020-09-14 PROCEDURE — 84443 ASSAY THYROID STIM HORMONE: CPT

## 2020-09-14 RX ORDER — SODIUM CHLORIDE 9 MG/ML
1000 INJECTION INTRAMUSCULAR; INTRAVENOUS; SUBCUTANEOUS ONCE
Refills: 0 | Status: COMPLETED | OUTPATIENT
Start: 2020-09-14 | End: 2020-09-14

## 2020-09-14 RX ADMIN — SODIUM CHLORIDE 1000 MILLILITER(S): 9 INJECTION INTRAMUSCULAR; INTRAVENOUS; SUBCUTANEOUS at 17:54

## 2020-09-14 NOTE — ED PROVIDER NOTE - CLINICAL SUMMARY MEDICAL DECISION MAKING FREE TEXT BOX
palpitations/lightheadedness now resolved with similar episodes over the last several months. Appears comfortable, vitals wnl. Labs, ekg to r/o arrhythmia and reassess

## 2020-09-14 NOTE — ED PROVIDER NOTE - OBJECTIVE STATEMENT
33yo F denies pmx p/w sudden onset 10 min episode of palpitations, sob, and lightheadedness now improved. Has had similar episodes every day for the last several months but has not seen a dr for sx. Now feels well. Denies etoh/drug use. No leg pain/swelling or OCP use.

## 2020-09-14 NOTE — ED PROVIDER NOTE - NS ED ROS FT
CONSTITUTIONAL: No fevers, no chills, no dizziness  EYES: no visual changes, no eye pain  EARS: no ear drainage, no ear pain, no change in hearing  NOSE: no nasal congestion  MOUTH/THROAT: no sore throat  CV: No chest pain  RESP: no cough  GI: No n/v/d, no abd pain  : no dysuria, no hematuria, no flank pain  MSK: no back pain, no extremity pain  SKIN: no rashes  NEURO: no headache, no focal weakness, no decreased sensation/parasthesias   PSYCHIATRIC: no known mental health issues.

## 2020-09-14 NOTE — ED ADULT NURSE NOTE - CHIEF COMPLAINT QUOTE
pt reports feeling lightheadedness and palpitations while sitting in kitchen at home 30 min ago. hx HTN, does not take meds

## 2020-09-14 NOTE — ED PROVIDER NOTE - PATIENT PORTAL LINK FT
You can access the FollowMyHealth Patient Portal offered by [facility] by registering at the following website: [email or web address]. By joining [employer]’s FollowMyHealth portal, you will also be able to view your health information using other applications (apps) compatible with our system. You can access the FollowMyHealth Patient Portal offered by [facility] by registering at the following website: [email or web address]. By joining [employer]’s FollowMyHealth portal, you will also be able to view your health information using other applications (apps) compatible with our system.

## 2020-09-14 NOTE — ED PROVIDER NOTE - NSFOLLOWUPCLINICS_GEN_ALL_ED_FT
[facility]  Cardiology  [address] [place] [address]  Phone: [phone]  Fax:   Follow Up Time:        [facility]  Cardiology  [address] [place] [address], [place] [address]  Phone: [phone]  Fax:

## 2020-09-14 NOTE — ED ADULT TRIAGE NOTE - CHIEF COMPLAINT QUOTE
pt reports feeling lightheadedness and palpitations while sitting in kitchen at home 30 min ago. pt reports feeling lightheadedness and palpitations while sitting in kitchen at home 30 min ago. hx HTN, does not take meds

## 2020-09-14 NOTE — ED PROVIDER NOTE - NSFOLLOWUPINSTRUCTIONS_ED_ALL_ED_FT
No signs of emergency medical condition on today's workup.  Presumptive diagnosis made, but further evaluation may be required by your primary care doctor or specialist for a definitive diagnosis.  Therefore, follow up as directed and if symptoms change/worsen or any emergency conditions, please return to the ER.    - Follow up with cardiology as discussed    - Keep yourself hydrated    - Continue all prescribed medications No signs of emergency medical condition on today's workup.  Presumptive diagnosis made, but further evaluation may be required by your primary care doctor or specialist for a definitive diagnosis.  Therefore, follow up as directed and if symptoms change/worsen or any emergency conditions, please return to the ER.    - Follow up with cardiology as discussed    - Keep yourself hydrated    - Continue all prescribed medications    Follow up with [facility] [phone].

## 2020-09-14 NOTE — ED ADULT NURSE NOTE - NSIMPLEMENTINTERV_GEN_ALL_ED
Implemented All Universal Safety Interventions:  [place] to call system. Call bell, personal items and telephone within reach. Instruct patient to call for assistance. Room bathroom lighting operational. Non-slip footwear when patient is off stretcher. Physically safe environment: no spills, clutter or unnecessary equipment. Stretcher in lowest position, wheels locked, appropriate side rails in place.

## 2020-09-14 NOTE — ED ADULT NURSE NOTE - OBJECTIVE STATEMENT
pt awake, alert and oriented x3 c/o feeling her heart is racing  that began 30 min PTA.  pt states she had a discussion with her daughter prior to feeling this way that may have madeher anxious.  denies chest pain or shortness of breath at this time.  nsr on cardiac monitor.  resp even and unlabored. BA well.

## 2020-11-10 ENCOUNTER — EMERGENCY (EMERGENCY)
Facility: HOSPITAL | Age: 35
LOS: 1 days | Discharge: HOME | End: 2020-11-10
Attending: EMERGENCY MEDICINE
Payer: COMMERCIAL

## 2020-11-10 VITALS
OXYGEN SATURATION: 98 % | HEIGHT: 67 IN | WEIGHT: 149.91 LBS | TEMPERATURE: 99 F | HEART RATE: 83 BPM | SYSTOLIC BLOOD PRESSURE: 141 MMHG | DIASTOLIC BLOOD PRESSURE: 97 MMHG | RESPIRATION RATE: 18 BRPM

## 2020-11-10 PROCEDURE — 99283 EMERGENCY DEPT VISIT LOW MDM: CPT

## 2020-11-10 RX ORDER — ACETAMINOPHEN 500 MG
650 TABLET ORAL ONCE
Refills: 0 | Status: COMPLETED | OUTPATIENT
Start: 2020-11-10 | End: 2020-11-10

## 2020-11-10 RX ORDER — PENICILLIN V POTASSIUM 250 MG
500 TABLET ORAL ONCE
Refills: 0 | Status: COMPLETED | OUTPATIENT
Start: 2020-11-10 | End: 2020-11-10

## 2020-11-10 RX ADMIN — Medication 650 MILLIGRAM(S): at 19:54

## 2020-11-10 RX ADMIN — Medication 500 MILLIGRAM(S): at 19:54

## 2020-11-10 NOTE — ED PROVIDER NOTE - CROS ED CARDIOVAS ALL NEG
Voicemail received 6/1/20 at 11:34 am    Patient would like to cancel 7/15/20 appointment due to being out of town and reschedule.     [phone] negative...

## 2020-11-10 NOTE — ED PROVIDER NOTE - PATIENT PORTAL LINK FT
You can access the FollowMyHealth Patient Portal offered by [facility] by registering at the following website: [email or web address]. By joining [employer]’s FollowMyHealth portal, you will also be able to view your health information using other applications (apps) compatible with our system.

## 2020-11-10 NOTE — ED PROVIDER NOTE - ATTENDING CONTRIBUTION TO CARE
33 y/o female no pmhx presenting to the ed or upper right tooth pain for the past day. cracked 3rd giht upper tooth x months noticed some swelling to cheeck today + pain - was toldthat needs it extracted but dentist stopped taking her insurance. Denies f/c/n/v/cp/sob/palpitations/ cough/rash/headache/dizziness/abd.pain/d/c/dysuria/hematuria    NO TRISMUS MILD SWELLING to cheeck; no swelling tp gum--VS stable tolerating secretions will give abx pain control dc with [facility]

## 2020-11-10 NOTE — ED PROVIDER NOTE - OBJECTIVE STATEMENT
pt is a 35 y/o female presenting to the ed or upper right tooth pain for the past day. pt states she cannot follow up with dentist due to insurance purposes. pt denies injuries or trauma to the area. pt states upper tooth is cracked. pt has been taking aleve for the pain. pt tolerating secretions. pt denies cp, sob, fevers, nausea vomiting ear pain sore throat back pain numbness or loss of sensation back pain

## 2020-11-10 NOTE — ED PROVIDER NOTE - PHYSICAL EXAMINATION
Const: Awake, alert and oriented. In no acute distress. Well appearing.  HEENT: NC/AT. Moist mucous membranes. ears tms clear bilaterally throat: pharynx clear uvula is midline tongue symmetrical, tenderness in upper right tooth, tooth cracked, no ludwigs angina tolerating secretions   Eyes: No scleral icterus. EOMI.  Neck:. Soft and supple. Full ROM without pain.  Cardiac: Regular rate and regular rhythm. +S1/S2. No murmurs. Peripheral pulses 2+ and symmetric. No LE edema.  Resp: Speaking in full sentences. No evidence of respiratory distress. No wheezes, rales or rhonchi.  Abd: Soft, non-tender, non-distended. Normal bowel sounds in all 4 quadrants. No guarding or rebound.  Back: Spine midline and non-tender. No CVAT.  Skin: No rashes, abrasions or lacerations.  Lymph: No cervical lymphadenopathy.  Neuro: Awake, alert & oriented x 3. Moves all extremities symmetrically.

## 2020-11-10 NOTE — ED ADULT TRIAGE NOTE - CHIEF COMPLAINT QUOTE
Pt. complaining of upper right sided tooth pain that began today with swelling.  Pt. took aleve at 1700 with no relief.

## 2020-11-10 NOTE — ED PROVIDER NOTE - NSFOLLOWUPCLINICS_GEN_ALL_ED_FT
[facility]  [place] [address]  Phone: [phone]  Fax:   Follow Up Time:

## 2020-11-10 NOTE — ED ADULT NURSE REASSESSMENT NOTE - NS ED NURSE REASSESS COMMENT FT1
pt seen and evaluated by JIGNESH Moore. discharge dispo received prior to this RNs assessment. pt d/c in stable condition, no apparent distress noted at this time. pt A&Ox3. pt able to ambulate with steady gait. pt in no distress at d/c.

## 2020-11-23 NOTE — DISCHARGE NOTE ADULT - LAUNCH MEDICATION RECONCILIATION
<<-----Click here for Discharge Medication Review Confirmed placement in Stomach/post-procedure radiography performed

## 2021-01-05 ENCOUNTER — EMERGENCY (EMERGENCY)
Facility: HOSPITAL | Age: 36
LOS: 1 days | Discharge: HOME | End: 2021-01-05
Attending: EMERGENCY MEDICINE
Payer: COMMERCIAL

## 2021-01-05 VITALS
OXYGEN SATURATION: 97 % | TEMPERATURE: 98 F | HEART RATE: 64 BPM | RESPIRATION RATE: 18 BRPM | DIASTOLIC BLOOD PRESSURE: 88 MMHG | SYSTOLIC BLOOD PRESSURE: 135 MMHG

## 2021-01-05 VITALS
DIASTOLIC BLOOD PRESSURE: 127 MMHG | SYSTOLIC BLOOD PRESSURE: 185 MMHG | HEIGHT: 67 IN | TEMPERATURE: 100 F | OXYGEN SATURATION: 100 % | HEART RATE: 63 BPM | RESPIRATION RATE: 18 BRPM | WEIGHT: 149.91 LBS

## 2021-01-05 LAB
ALBUMIN SERPL ELPH-MCNC: 3.8 G/DL — SIGNIFICANT CHANGE UP (ref 3.3–5.2)
ALP SERPL-CCNC: 52 U/L — SIGNIFICANT CHANGE UP (ref 40–120)
ALT FLD-CCNC: 9 U/L — SIGNIFICANT CHANGE UP
ANION GAP SERPL CALC-SCNC: 10 MMOL/L — SIGNIFICANT CHANGE UP (ref 5–17)
APTT BLD: 29.2 SEC — SIGNIFICANT CHANGE UP (ref 27.5–35.5)
AST SERPL-CCNC: 20 U/L — SIGNIFICANT CHANGE UP
BASOPHILS # BLD AUTO: 0.03 K/UL — SIGNIFICANT CHANGE UP (ref 0–0.2)
BASOPHILS NFR BLD AUTO: 0.4 % — SIGNIFICANT CHANGE UP (ref 0–2)
BILIRUB SERPL-MCNC: 0.3 MG/DL — LOW (ref 0.4–2)
BUN SERPL-MCNC: 10 MG/DL — SIGNIFICANT CHANGE UP (ref 8–20)
CALCIUM SERPL-MCNC: 8.7 MG/DL — SIGNIFICANT CHANGE UP (ref 8.6–10.2)
CHLORIDE SERPL-SCNC: 107 MMOL/L — SIGNIFICANT CHANGE UP (ref 98–107)
CO2 SERPL-SCNC: 22 MMOL/L — SIGNIFICANT CHANGE UP (ref 22–29)
CREAT SERPL-MCNC: 0.73 MG/DL — SIGNIFICANT CHANGE UP (ref 0.5–1.3)
D DIMER BLD IA.RAPID-MCNC: <150 NG/ML DDU — SIGNIFICANT CHANGE UP
EOSINOPHIL # BLD AUTO: 0.06 K/UL — SIGNIFICANT CHANGE UP (ref 0–0.5)
EOSINOPHIL NFR BLD AUTO: 0.8 % — SIGNIFICANT CHANGE UP (ref 0–6)
GLUCOSE SERPL-MCNC: 95 MG/DL — SIGNIFICANT CHANGE UP (ref 70–99)
HCG SERPL-ACNC: <4 MIU/ML — SIGNIFICANT CHANGE UP
HCT VFR BLD CALC: 33.9 % — LOW (ref 34.5–45)
HGB BLD-MCNC: 11.4 G/DL — LOW (ref 11.5–15.5)
IMM GRANULOCYTES NFR BLD AUTO: 0.3 % — SIGNIFICANT CHANGE UP (ref 0–1.5)
INR BLD: 1.02 RATIO — SIGNIFICANT CHANGE UP (ref 0.88–1.16)
LYMPHOCYTES # BLD AUTO: 2.59 K/UL — SIGNIFICANT CHANGE UP (ref 1–3.3)
LYMPHOCYTES # BLD AUTO: 36.6 % — SIGNIFICANT CHANGE UP (ref 13–44)
MCHC RBC-ENTMCNC: 29.9 PG — SIGNIFICANT CHANGE UP (ref 27–34)
MCHC RBC-ENTMCNC: 33.6 GM/DL — SIGNIFICANT CHANGE UP (ref 32–36)
MCV RBC AUTO: 89 FL — SIGNIFICANT CHANGE UP (ref 80–100)
MONOCYTES # BLD AUTO: 0.3 K/UL — SIGNIFICANT CHANGE UP (ref 0–0.9)
MONOCYTES NFR BLD AUTO: 4.2 % — SIGNIFICANT CHANGE UP (ref 2–14)
NEUTROPHILS # BLD AUTO: 4.07 K/UL — SIGNIFICANT CHANGE UP (ref 1.8–7.4)
NEUTROPHILS NFR BLD AUTO: 57.7 % — SIGNIFICANT CHANGE UP (ref 43–77)
NT-PROBNP SERPL-SCNC: 178 PG/ML — SIGNIFICANT CHANGE UP (ref 0–300)
PLATELET # BLD AUTO: 320 K/UL — SIGNIFICANT CHANGE UP (ref 150–400)
POTASSIUM SERPL-MCNC: 4.5 MMOL/L — SIGNIFICANT CHANGE UP (ref 3.5–5.3)
POTASSIUM SERPL-SCNC: 4.5 MMOL/L — SIGNIFICANT CHANGE UP (ref 3.5–5.3)
PROT SERPL-MCNC: 6.7 G/DL — SIGNIFICANT CHANGE UP (ref 6.6–8.7)
PROTHROM AB SERPL-ACNC: 11.8 SEC — SIGNIFICANT CHANGE UP (ref 10.6–13.6)
RBC # BLD: 3.81 M/UL — SIGNIFICANT CHANGE UP (ref 3.8–5.2)
RBC # FLD: 12.8 % — SIGNIFICANT CHANGE UP (ref 10.3–14.5)
SODIUM SERPL-SCNC: 139 MMOL/L — SIGNIFICANT CHANGE UP (ref 135–145)
TROPONIN T SERPL-MCNC: <0.01 NG/ML — SIGNIFICANT CHANGE UP (ref 0–0.06)
TSH SERPL-MCNC: 1.1 UIU/ML — SIGNIFICANT CHANGE UP (ref 0.27–4.2)
WBC # BLD: 7.07 K/UL — SIGNIFICANT CHANGE UP (ref 3.8–10.5)
WBC # FLD AUTO: 7.07 K/UL — SIGNIFICANT CHANGE UP (ref 3.8–10.5)

## 2021-01-05 PROCEDURE — 85610 PROTHROMBIN TIME: CPT

## 2021-01-05 PROCEDURE — 71045 X-RAY EXAM CHEST 1 VIEW: CPT

## 2021-01-05 PROCEDURE — G0378: CPT

## 2021-01-05 PROCEDURE — 36415 COLL VENOUS BLD VENIPUNCTURE: CPT

## 2021-01-05 PROCEDURE — 75574 CT ANGIO HRT W/3D IMAGE: CPT | Mod: 26

## 2021-01-05 PROCEDURE — 84443 ASSAY THYROID STIM HORMONE: CPT

## 2021-01-05 PROCEDURE — 75574 CT ANGIO HRT W/3D IMAGE: CPT

## 2021-01-05 PROCEDURE — 99234 HOSP IP/OBS SM DT SF/LOW 45: CPT

## 2021-01-05 PROCEDURE — 85379 FIBRIN DEGRADATION QUANT: CPT

## 2021-01-05 PROCEDURE — 83880 ASSAY OF NATRIURETIC PEPTIDE: CPT

## 2021-01-05 PROCEDURE — 99284 EMERGENCY DEPT VISIT MOD MDM: CPT | Mod: 25

## 2021-01-05 PROCEDURE — 93010 ELECTROCARDIOGRAM REPORT: CPT | Mod: 76

## 2021-01-05 PROCEDURE — 93005 ELECTROCARDIOGRAM TRACING: CPT

## 2021-01-05 PROCEDURE — 71045 X-RAY EXAM CHEST 1 VIEW: CPT | Mod: 26

## 2021-01-05 PROCEDURE — 85025 COMPLETE CBC W/AUTO DIFF WBC: CPT

## 2021-01-05 PROCEDURE — 84702 CHORIONIC GONADOTROPIN TEST: CPT

## 2021-01-05 PROCEDURE — 96374 THER/PROPH/DIAG INJ IV PUSH: CPT

## 2021-01-05 PROCEDURE — 84484 ASSAY OF TROPONIN QUANT: CPT

## 2021-01-05 PROCEDURE — 80053 COMPREHEN METABOLIC PANEL: CPT

## 2021-01-05 PROCEDURE — 85730 THROMBOPLASTIN TIME PARTIAL: CPT

## 2021-01-05 RX ORDER — LOSARTAN POTASSIUM 100 MG/1
50 TABLET, FILM COATED ORAL DAILY
Refills: 0 | Status: DISCONTINUED | OUTPATIENT
Start: 2021-01-05 | End: 2021-01-09

## 2021-01-05 RX ORDER — MORPHINE SULFATE 50 MG/1
2 CAPSULE, EXTENDED RELEASE ORAL ONCE
Refills: 0 | Status: DISCONTINUED | OUTPATIENT
Start: 2021-01-05 | End: 2021-01-05

## 2021-01-05 RX ORDER — AMLODIPINE BESYLATE 2.5 MG/1
5 TABLET ORAL ONCE
Refills: 0 | Status: COMPLETED | OUTPATIENT
Start: 2021-01-05 | End: 2021-01-05

## 2021-01-05 RX ORDER — ASPIRIN/CALCIUM CARB/MAGNESIUM 324 MG
325 TABLET ORAL ONCE
Refills: 0 | Status: COMPLETED | OUTPATIENT
Start: 2021-01-05 | End: 2021-01-05

## 2021-01-05 RX ORDER — METOPROLOL TARTRATE 50 MG
25 TABLET ORAL DAILY
Refills: 0 | Status: DISCONTINUED | OUTPATIENT
Start: 2021-01-05 | End: 2021-01-09

## 2021-01-05 RX ORDER — LOSARTAN POTASSIUM 100 MG/1
1 TABLET, FILM COATED ORAL
Qty: 30 | Refills: 0
Start: 2021-01-05 | End: 2021-02-03

## 2021-01-05 RX ORDER — METOPROLOL TARTRATE 50 MG
1 TABLET ORAL
Qty: 30 | Refills: 0
Start: 2021-01-05 | End: 2021-02-03

## 2021-01-05 RX ADMIN — AMLODIPINE BESYLATE 5 MILLIGRAM(S): 2.5 TABLET ORAL at 03:40

## 2021-01-05 RX ADMIN — MORPHINE SULFATE 2 MILLIGRAM(S): 50 CAPSULE, EXTENDED RELEASE ORAL at 02:53

## 2021-01-05 RX ADMIN — Medication 25 MILLIGRAM(S): at 11:22

## 2021-01-05 RX ADMIN — Medication 325 MILLIGRAM(S): at 07:37

## 2021-01-05 NOTE — ED ADULT NURSE REASSESSMENT NOTE - NS ED NURSE REASSESS COMMENT FT1
Report given to JOSEY BAEZ pt at CT will be transported to new [facility].
pt. received from RN. pt. a&ox3. pt. states she came in for chest pain, denies pain at this time. pt. in no apparent distress at this time, breathing even and unlabored. pt. placed under observation. pt. informed on plan of care.
pt. refusing swab.
Assumed care of the patient @1100. Pt A&Ox4. No respiratory distress, VSs afebrile. Pt on cardiac monitor, sinus rhythm denies chest pain at this time.  Patient in understanding of plan of care. Patient with no further questions for the RN. Resting in comfort. Call bell within reach and encouraged to use when assistance needed. Will continue to monitor.

## 2021-01-05 NOTE — ED PROVIDER NOTE - CARE PLAN
Principal Discharge DX:	Chest pain   Principal Discharge DX:	Chest pain  Secondary Diagnosis:	Essential hypertension

## 2021-01-05 NOTE — ED CDU PROVIDER DISPOSITION NOTE - CARE PROVIDER_API CALL
Mukul Posada (MD)  Cardiovascular Disease; Internal Medicine; Interventional Cardiology  [address] [place] [address]  Phone: [phone]  Fax: [phone]  Follow Up Time:

## 2021-01-05 NOTE — ED PROVIDER NOTE - ATTENDING CONTRIBUTION TO CARE
36 yo well appearing female with Hx of HTN and abnl stress test in the past several weeks presents for evaluation due to acute chest pain. I personally saw the patient with the PA, and completed the key components of the history and physical exam. I then discussed the management plan with the PA.

## 2021-01-05 NOTE — ED PROVIDER NOTE - OBJECTIVE STATEMENT
pt is a 34 y/o female with a pmhx of HTN presenting to the ed with chest pain started one hour ago states radiates to her left arm into upper back. pt states she sees cardiologist dr arango, recently failed a exercise stress test, states she needs to go back to cardiologist for follow up appointment. pt on metoprolol 25 mg once a day for HTN. pt denies hx of dvt or pe no calf pain or leg swelling not on OCPs no recent surgeries or travel. pt denies sob palpitations fever cough abd pain nausea vomiting back pain numbness or loss of sensation

## 2021-01-05 NOTE — ED CDU PROVIDER DISPOSITION NOTE - NSFOLLOWUPINSTRUCTIONS_ED_ALL_ED_FT
- you have a cardiac abnormality, follow up with cardiology in 2-3 weeks.  - you have been put on additional medication for your blood pressure.  Continue Toprol, start losartan 50mg daily

## 2021-01-05 NOTE — ED ADULT TRIAGE NOTE - SPO2 (%)
Have Your Skin Lesions Been Treated?: not been treated Is This A New Presentation, Or A Follow-Up?: Skin Lesions How Severe Is Your Skin Lesion?: mild 100

## 2021-01-05 NOTE — ED CDU PROVIDER DISPOSITION NOTE - ATTENDING CONTRIBUTION TO CARE
I, Nara Newman, performed a face to face bedside interview with this patient regarding history of present illness, review of symptoms and relevant past medical, social and family history.  I completed an independent physical examination. Medical decision making, follow-up on ordered tests (ie labs, radiologic studies) and re-evaluation of the patient's status has been communicated to the ACP.  Disposition of the patient will be based on test outcome and response to ED interventions.     cTA negative for acute coronary pathology, noted coronary artery anomonly, given instruction to Follow up outpt

## 2021-01-05 NOTE — ED PROVIDER NOTE - EKG ADDITIONAL QUESTION - PERFORMED INDEPENDENT VISUALIZATION
Yes Tremfya Counseling: I discussed with the patient the risks of guselkumab including but not limited to immunosuppression, serious infections, worsening of inflammatory bowel disease and drug reactions.  The patient understands that monitoring is required including a PPD at baseline and must alert us or the primary physician if symptoms of infection or other concerning signs are noted.

## 2021-01-05 NOTE — ED PROVIDER NOTE - PROGRESS NOTE DETAILS
reviewed lab work chest xray ekg discussed wtih patient observation - obtain ct cardiac serial troponin/EKG

## 2021-01-05 NOTE — ED CDU PROVIDER INITIAL DAY NOTE - OBJECTIVE STATEMENT
pt is a 36 y/o female with a pmhx of HTN presenting to the ed with chest pain started one hour ago states radiates to her left arm into upper back. pt states she sees cardiologist dr arango, recently failed a exercise stress test, states she needs to go back to cardiologist for follow up appointment. pt on metoprolol 25 mg once a day for HTN. pt denies hx of dvt or pe no calf pain or leg swelling not on OCPs no recent surgeries or travel. pt denies sob palpitations fever cough abd pain nausea vomiting back pain numbness or loss of sensation

## 2021-01-05 NOTE — ED CDU PROVIDER INITIAL DAY NOTE - ATTENDING CONTRIBUTION TO CARE
34 yo female with hx of poor controlled htn presenting with acute chest pain and reported history of abln stress test. Patient feeling better and has remained stable at this time. Patient pending cardiac CT. I personally saw the patient with the PA, and completed the key components of the history and physical exam. I then discussed the management plan with the PA.

## 2021-01-05 NOTE — ED ADULT TRIAGE NOTE - CHIEF COMPLAINT QUOTE
patient states that she has been having chest pain for 1 hour radiating to left shoulder, neck back and HA feeling nauseous

## 2021-01-05 NOTE — CONSULT NOTE ADULT - ASSESSMENT
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

## 2021-01-05 NOTE — ED CDU PROVIDER DISPOSITION NOTE - CLINICAL COURSE
pt is a 34 y/o female with a pmhx of HTN presenting to the ed with chest pain started one hour ago states radiates to her left arm into upper back. pt states she sees cardiologist dr arango, recently failed a exercise stress test due to BP increasing dangerously, states she needs to go back to cardiologist for follow up appointment. pt on metoprolol 25 mg once a day for HTN. pt denies hx of dvt or pe no calf pain or leg swelling not on OCPs no recent surgeries or travel. pt denies sob palpitations fever cough abd pain nausea vomiting back pain numbness or loss of sensation.  Troponin negative, seen by cardiology, added Losartan and BP has been controlled.  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  Can f/u with in 2-3 weeks outpatient

## 2021-01-05 NOTE — CONSULT NOTE ADULT - SUBJECTIVE AND OBJECTIVE BOX
[facility], THE HEART CENTER                                   [address] [place] [address]                                                      PHONE: [phone]                                                         FAX: [phone]  [email or web address]  ---------------------------------------------------------------------------------------------------------------------------------    Reason for Consult: chest pain    HPI:  DEYA QUAN is an 35y Female with HTN under poor control recently, MVP with moderate MR, normal EF, recent neg ETT at low workload 12/20 with Dr Posada who came to ER with chest pain.    PAST MEDICAL & SURGICAL HISTORY:  HTN (hypertension)    No significant past surgical history        No Known Allergies      MEDICATIONS  (STANDING):    MEDICATIONS  (PRN):      Social History:  Cigarettes:         neg           Alchohol:       neg          Illicit Drug Abuse:  neg  neg FH CAD    ROS: Negative other than as mentioned in HPI.    Vital Signs Last 24 Hrs  T(C): 36.6 (05 Jan 2021 07:38), Max: 37.5 (05 Jan 2021 01:56)  T(F): 97.9 (05 Jan 2021 07:38), Max: 99.5 (05 Jan 2021 01:56)  HR: 68 (05 Jan 2021 07:38) (58 - 68)  BP: 156/102 (05 Jan 2021 07:38) (156/102 - 185/127)  BP(mean): --  RR: 18 (05 Jan 2021 07:38) (18 - 18)  SpO2: 100% (05 Jan 2021 07:38) (100% - 100%)  ICU Vital Signs Last 24 Hrs  DEYA QUAN  I&O's Detail    I&O's Summary    Drug Dosing Weight  DEYA QUAN      PHYSICAL EXAM:  General: Appears well developed, well nourished alert and cooperative.  HEENT: Head; normocephalic, atraumatic.  Eyes: Pupils reactive, cornea wnl.  Neck: Supple, no nodes adenopathy, no NVD or carotid bruit or thyromegaly.  CARDIOVASCULAR: Normal S1 and S2, No murmur, rub, gallop or lift.   LUNGS: No rales, rhonchi or wheeze. Normal breath sounds bilaterally.  ABDOMEN: Soft, nontender without mass or organomegaly. bowel sounds normoactive.  EXTREMITIES: No clubbing, cyanosis or edema. Distal pulses wnl.   SKIN: warm and dry with normal turgor.  NEURO: Alert/oriented x 3/normal motor exam. No pathologic reflexes.    PSYCH: normal affect.        LABS:                        11.4   7.07  )-----------( 320      ( 05 Jan 2021 02:57 )             33.9     01-05    139  |  107  |  10.0  ----------------------------<  95  4.5   |  22.0  |  0.73    Ca    8.7      05 Jan 2021 02:57    TPro  6.7  /  Alb  3.8  /  TBili  0.3<L>  /  DBili  x   /  AST  20  /  ALT  9   /  AlkPhos  52  01-05    DEYA QUAN  CARDIAC MARKERS ( 05 Jan 2021 06:09 )  x     / <0.01 ng/mL / x     / x     / x      CARDIAC MARKERS ( 05 Jan 2021 02:57 )  x     / <0.01 ng/mL / x     / x     / x          PT/INR - ( 05 Jan 2021 02:57 )   PT: 11.8 sec;   INR: 1.02 ratio         PTT - ( 05 Jan 2021 02:57 )  PTT:29.2 sec      RADIOLOGY & ADDITIONAL STUDIES:    INTERPRETATION OF TELEMETRY (personally reviewed):    ECG: reviewed NSR no acute changes    ECHO: in office 12/20 LVEF 65% MVP with mod MR    STRESS TEST: 2:53 min hypertensive response, sub max HR no ischemia    CARDIAC CATHETERIZATION:                       [facility], THE HEART [facility]                                   [address] [place] [address]                                                      PHONE: [phone]                                                         FAX: [phone]  [email or web address]  ---------------------------------------------------------------------------------------------------------------------------------    Reason for Consult: chest pain    HPI:  DEYA QUAN is an 35y Female with HTN under poor control recently, MVP with moderate MR, normal EF, recent neg ETT at low workload 12/20 with Dr Posada who came to ER with chest pain.  Pt describes intermittent left sided chest pain, radiating to left arm and back, non exertional, relieved by morphine.  In ER no acute ECG changes and trop neg X 2    PAST MEDICAL & SURGICAL HISTORY:  HTN (hypertension)    No significant past surgical history        No Known Allergies      MEDICATIONS  (STANDING):    MEDICATIONS  (PRN):      Social History:  Cigarettes:         neg           Alchohol:       neg          Illicit Drug Abuse:  neg  neg FH CAD    ROS: Negative other than as mentioned in HPI.    Vital Signs Last 24 Hrs  T(C): 36.6 (05 Jan 2021 07:38), Max: 37.5 (05 Jan 2021 01:56)  T(F): 97.9 (05 Jan 2021 07:38), Max: 99.5 (05 Jan 2021 01:56)  HR: 68 (05 Jan 2021 07:38) (58 - 68)  BP: 156/102 (05 Jan 2021 07:38) (156/102 - 185/127)  BP(mean): --  RR: 18 (05 Jan 2021 07:38) (18 - 18)  SpO2: 100% (05 Jan 2021 07:38) (100% - 100%)  ICU Vital Signs Last 24 Hrs  DEYA QUAN  I&O's Detail    I&O's Summary    Drug Dosing Weight  DEYA QUAN      PHYSICAL EXAM:  General: Appears well developed, well nourished alert and cooperative.  HEENT: Head; normocephalic, atraumatic.  Eyes: Pupils reactive, cornea wnl.  Neck: Supple, no nodes adenopathy, no NVD or carotid bruit or thyromegaly.  CARDIOVASCULAR: Normal S1 and S2, No murmur, rub, gallop or lift.   LUNGS: No rales, rhonchi or wheeze. Normal breath sounds bilaterally.  ABDOMEN: Soft, nontender without mass or organomegaly. bowel sounds normoactive.  EXTREMITIES: No clubbing, cyanosis or edema. Distal pulses wnl. equal pulses bilat UE/LE  SKIN: warm and dry with normal turgor.  NEURO: Alert/oriented x 3/normal motor exam. No pathologic reflexes.    PSYCH: normal affect.        LABS:                        11.4   7.07  )-----------( 320      ( 05 Jan 2021 02:57 )             33.9     01-05    139  |  107  |  10.0  ----------------------------<  95  4.5   |  22.0  |  0.73    Ca    8.7      05 Jan 2021 02:57    TPro  6.7  /  Alb  3.8  /  TBili  0.3<L>  /  DBili  x   /  AST  20  /  ALT  9   /  AlkPhos  52  01-05    DEYA QUAN  CARDIAC MARKERS ( 05 Jan 2021 06:09 )  x     / <0.01 ng/mL / x     / x     / x      CARDIAC MARKERS ( 05 Jan 2021 02:57 )  x     / <0.01 ng/mL / x     / x     / x          PT/INR - ( 05 Jan 2021 02:57 )   PT: 11.8 sec;   INR: 1.02 ratio         PTT - ( 05 Jan 2021 02:57 )  PTT:29.2 sec      RADIOLOGY & ADDITIONAL STUDIES:    INTERPRETATION OF TELEMETRY (personally reviewed):    ECG: reviewed NSR no acute changes    ECHO: in office 12/20 LVEF 65% MVP with mod MR    STRESS TEST: 2:53 min hypertensive response, sub max HR no ischemia

## 2021-01-05 NOTE — ED CDU PROVIDER DISPOSITION NOTE - PATIENT PORTAL LINK FT
You can access the FollowMyHealth Patient Portal offered by [facility] by registering at the following website: [email or web address]. By joining [facility]’s FollowMyHealth portal, you will also be able to view your health information using other applications (apps) compatible with our system.

## 2021-03-04 ENCOUNTER — APPOINTMENT (OUTPATIENT)
Dept: PULMONOLOGY | Facility: CLINIC | Age: 36
End: 2021-03-04
Payer: MEDICAID

## 2021-03-04 VITALS
SYSTOLIC BLOOD PRESSURE: 120 MMHG | RESPIRATION RATE: 16 BRPM | HEIGHT: 66 IN | OXYGEN SATURATION: 96 % | WEIGHT: 147 LBS | DIASTOLIC BLOOD PRESSURE: 70 MMHG | BODY MASS INDEX: 23.63 KG/M2 | HEART RATE: 78 BPM

## 2021-03-04 DIAGNOSIS — Z82.49 FAMILY HISTORY OF ISCHEMIC HEART DISEASE AND OTHER DISEASES OF THE CIRCULATORY SYSTEM: ICD-10-CM

## 2021-03-04 DIAGNOSIS — Z84.1 FAMILY HISTORY OF DISORDERS OF KIDNEY AND URETER: ICD-10-CM

## 2021-03-04 DIAGNOSIS — Z87.01 PERSONAL HISTORY OF PNEUMONIA (RECURRENT): ICD-10-CM

## 2021-03-04 DIAGNOSIS — R91.8 OTHER NONSPECIFIC ABNORMAL FINDING OF LUNG FIELD: ICD-10-CM

## 2021-03-04 DIAGNOSIS — Z86.79 PERSONAL HISTORY OF OTHER DISEASES OF THE CIRCULATORY SYSTEM: ICD-10-CM

## 2021-03-04 DIAGNOSIS — N60.02 SOLITARY CYST OF LEFT BREAST: ICD-10-CM

## 2021-03-04 PROCEDURE — 99072 ADDL SUPL MATRL&STAF TM PHE: CPT

## 2021-03-04 PROCEDURE — 99204 OFFICE O/P NEW MOD 45 MIN: CPT

## 2021-03-04 NOTE — CONSULT LETTER
[Dear  ___] : Dear  [unfilled], [Consult Letter:] : I had the pleasure of evaluating your patient, [unfilled]. [Please see my note below.] : Please see my note below. [Consult Closing:] : Thank you very much for allowing me to participate in the care of this patient.  If you have any questions, please do not hesitate to contact me. [Sincerely,] : Sincerely, [FreeTextEntry3] : Albino Garcia MD\par

## 2021-03-04 NOTE — REVIEW OF SYSTEMS
[Cough] : no cough [Hemoptysis] : no hemoptysis [Chest Tightness] : no chest tightness [Sputum] : no sputum [Pleuritic Pain] : no pleuritic pain [Wheezing] : no wheezing [SOB on Exertion] : sob on exertion [Negative] : Endocrine [TextBox_30] : As per HPI

## 2021-03-14 ENCOUNTER — EMERGENCY (EMERGENCY)
Facility: HOSPITAL | Age: 36
LOS: 1 days | Discharge: HOME | End: 2021-03-14
Attending: EMERGENCY MEDICINE
Payer: COMMERCIAL

## 2021-03-14 VITALS
HEIGHT: 67 IN | RESPIRATION RATE: 18 BRPM | OXYGEN SATURATION: 97 % | WEIGHT: 149.91 LBS | DIASTOLIC BLOOD PRESSURE: 100 MMHG | SYSTOLIC BLOOD PRESSURE: 154 MMHG | TEMPERATURE: 99 F | HEART RATE: 102 BPM

## 2021-03-14 LAB — HCG UR QL: NEGATIVE — SIGNIFICANT CHANGE UP

## 2021-03-14 PROCEDURE — 99285 EMERGENCY DEPT VISIT HI MDM: CPT

## 2021-03-14 PROCEDURE — 99283 EMERGENCY DEPT VISIT LOW MDM: CPT

## 2021-03-14 PROCEDURE — 81025 URINE PREGNANCY TEST: CPT

## 2021-03-14 RX ORDER — ERYTHROMYCIN BASE 5 MG/GRAM
1 OINTMENT (GRAM) OPHTHALMIC (EYE)
Refills: 0 | Status: DISCONTINUED | OUTPATIENT
Start: 2021-03-14 | End: 2021-03-19

## 2021-03-14 RX ORDER — ACETAMINOPHEN 500 MG
650 TABLET ORAL ONCE
Refills: 0 | Status: COMPLETED | OUTPATIENT
Start: 2021-03-14 | End: 2021-03-14

## 2021-03-14 RX ADMIN — Medication 650 MILLIGRAM(S): at 20:42

## 2021-03-14 RX ADMIN — Medication 650 MILLIGRAM(S): at 22:33

## 2021-03-14 RX ADMIN — Medication 1 APPLICATION(S): at 21:25

## 2021-03-14 NOTE — ED STATDOCS - NSFOLLOWUPINSTRUCTIONS_ED_ALL_ED_FT
Patient education: Facial fractures (The Basics)  View in Greek  Written by the doctors and editors at [facility]  What are facial fractures?  A fracture is another word for a broken bone. When a person has a facial fracture, they have broken one or more bones in their face (figure 1).    Facial fractures can happen when a person is hit hard in the face. Common causes of facial fractures include sports injuries, car or bike accidents, and falls. Violence, like being hit in the face by another person, can also cause fractures.    There are different kinds of facial fractures, depending on which bone is involved and how it breaks. Some fractures are more serious than others. A fracture is called "displaced" if the bone is pushed out of its normal position. "Non-displaced" means the bone has a break or crack but stays in place.    Facial fractures can lead to other problems. For example:    ?If a muscle or nerve is injured, this can affect a person's ability to move or feel part of their face.    ?Depending on where the fracture is, it might affect a person's breathing, seeing, speaking, or hearing.    ?If a person has a serious head injury in addition to their facial fracture, this can lead to other serious problems. They can include brain damage, bleeding in the brain, infection, or seizures.    What are the symptoms of a facial fracture?  Symptoms can be mild or severe, depending on where the fracture is, how serious it is, and whether the person also has a head injury. Possible symptoms can include:    ?Pain, bruising, or swelling – Sometimes, it takes a day for swelling to start.    ?Changes to how the face looks – For example, the nose or jaw might be crooked or the eyes might not line up.    ?Headache    ?Feeling very tired, confused, or dizzy    ?Bleeding from the nose or ear    ?Clear fluid draining from the nose or ear – This is spinal fluid, the fluid that surrounds the brain and spinal cord.    ?Vomiting    ?Trouble smelling, hearing, or seeing    ?Weakness or numbness of the face    Is there a test for a facial fracture?  Yes. The doctor will ask about your symptoms and injury, and do an exam. To look for a fracture, he or she can do a CT scan of your face. This is an imaging test that creates pictures of the bones in the face and skull.    Depending on the type of injury, the doctor might also do X-rays or other imaging tests.    How are facial fractures treated?  It depends. If you have a small fracture and the bones have stayed in place, it might be able to heal on its own without treatment.    For people with severe fractures or other problems, treatment depends on the type of fracture and where it is. This might require staying in the hospital. For example:    ?Fractures around the eye – If the bone around the eye is fractured, the doctor will check the eyeball for injury. They will also check the person's vision and how well they can move their eyes. If there is serious injury, a doctor might need to do a procedure to relieve pressure in the eye. Surgery might also be needed.    ?Fractures around the nose – If the injury pushed the nose bones out of position, the bones will need to be put back where they belong. Doctors can sometimes do this using their hands or special tools. If this doesn't work, the bones can be put back into place with surgery.    ?Fractures of the cheek bone – Fractures in this area are often displaced, meaning the bones are out of position. This usually requires surgery to fix, in order to keep the normal shape of the face.    ?Fractures around the mouth or of the jaw bone – If any of the teeth fell out or are loose, a doctor or dentist will need to fix this. Sometimes, teeth can be put back into place. If the person cannot open their mouth normally, a procedure or surgery might be needed to put the jaw back into the correct position. In some cases, the person will need to eat only soft foods while the fracture heals. With more serious fractures, the jaw might need to be held closed (with special elastic or wire) for a few weeks in order to heal.    If the person has cuts or open wounds on the face, they might get antibiotics or vaccines to prevent infection.    When should I call the doctor or nurse?  After the person with the fracture goes home, call the doctor or nurse if he or she has:    ?Nausea    ?A headache that won't go away    ?Fever    You should call the doctor or nurse right away if the person:    ?Starts vomiting    ?Has a severe headache    ?Has a seizure    ?Has trouble walking, talking, or seeing    ?Has weakness or numbness in his or her body    ?Isn't acting like him- or herself    ?Has blood or fluid leaking from the nose or ear    ?Passes out    What can people do to help prevent facial fractures?  To help prevent facial fractures, you should:    ?Wear safety gear, such as a helmet, goggles, or a face mask, when you play certain sports.    ?Wear a helmet when you ride a bike or motorcycle.    ?Wear a seat belt every time you drive or ride in a car.
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
## 2021-03-14 NOTE — ED STATDOCS - PROGRESS NOTE DETAILS
Chirag Whipple: Pt seen by intake physician and HPI/ROS/PE/plan reviewed Confirmed and adjusted were appropriate.    PE: GEN: Awake, alert,  NAD,  EYES: PERRL, Pt differed fluorecin exam,  CARDIAC: Reg rate and rhythm, S1,S2, RRR  RESP: No distress noted. Lungs CTA bilaterally no wheeze, ronchi, rales. ABD: soft,  non-tender, no guarding. . NEURO: AOx3, no focal deficits   PLAN: Pt differed fluorine exam would like CT at this time instead due to trauma. Chirag Howard PA-C:  Pt seen resting comfortable in no acute distress in chair. Pt wanting to leave AMA does not want to wait for testing, I had a lengthy discussion with patient, and the patient wishes to leave at this time.The patient understands that he/she is leaving against medical advice despite the risk of missing a potential serious diagnosis which may lead to injury, disability and/or death. I discussed with the patient which tests would need to be performed and what type of monitoring would be necessary for the patient as well. I was unable to convice the patient to stay for further work-up.The patient is alert and oriented and demonstrates competence in making medical decisions.

## 2021-03-14 NOTE — ED STATDOCS - NS ED ROS FT
ROS:  GEN: (-) fevers/chills  ENT: (+) conjunctivitis (+) eye pain   NECK: (-) stiffness, (-) swelling  RESP: (-) shortness of breath, (-) cough  CV: (-) chest pain, (-) palpitations  GI: (-) nausea, (-) vomiting, (-) pain, (-) constipation, (-) diarrhea  : (-) hematuria, (-) dysuria  EXT: (-) edema  NEURO: (-) weakness, (-) headache, (-) dizziness, (-) syncope  MSK: (-) muscle pain  SKIN: (+) abrasion

## 2021-03-14 NOTE — ED STATDOCS - ATTENDING CONTRIBUTION TO CARE
Nerissa: I performed a face to face bedside interview with patient regarding history of present illness, review of symptoms and past medical history. I completed an independent physical exam and ordered tests/medications as needed.  I have discussed patient's plan of care with advanced care provider. The advanced care provider assisted in  executing the discussed plan. I was available for any questions or issues that may have arose during the execution of the plan of care.

## 2021-03-14 NOTE — ED STATDOCS - PATIENT PORTAL LINK FT
You can access the FollowMyHealth Patient Portal offered by [facility] by registering at the following website: [email or web address]. By joining [facility]’s FollowMyHealth portal, you will also be able to view your health information using other applications (apps) compatible with our system.

## 2021-03-14 NOTE — ED STATDOCS - PHYSICAL EXAMINATION
Gen: No acute distress, non toxic  HEENT: Mucous membranes moist, pink conjunctivae, EOMI, Mild swelling to mandible without significant tenderness, normal bite, B/l conjunctival injection, EOMI, no tenderness and superficial abrasion to buccal mucosa   CV: RRR, nl s1/s2.  Resp: CTAB, normal rate and effort  GI: Abdomen soft, NT, ND. No rebound, no guarding  : No CVAT  Neuro: A&O x 3, moving all 4 extremities  MSK: No spine or joint tenderness to palpation  Skin: No rashes. intact and perfused. No other bruising

## 2021-06-15 NOTE — ED PROVIDER NOTE - ATTENDING CONTRIBUTION TO CARE
Yes - the patient is able to be screened I, Nara Newman, have personally seen and examined this patient. I have fully participated in the care of this patient. I have reviewed all pertinent clinical information, including history, physical exam, plan and the Resident's note and agree except as noted below.     35yo M with pmh HTN, intermittent palpitations for a few weeks today episode prolonged/more severe 'felt like going to die' usually can 'calm herself down' but today couldn't, lasted ~10mins. some CP with episode tightness, nonradiating. no SOB/nauesa. no syncope. +7lb wt loss last few weeks due to poor po intake due to 'being busy' no night sweats. no fever/chills. asymptoamtic now     Gen: NAD, AOx3  Head: NCAT  HEENT: EOMI, oral mucosa moist, normal conjunctiva, neck supple  Lung: CTAB, no respiratory distress  CV: rrr, no murmur, Normal perfusion  Abd: soft, NTND  MSK: No edema, no visible deformities  Neuro: No focal neurologic deficits  Skin: No rash   Psych: normal affect     normal EKG, unlikley ACS, more of a feeling of palpitaitons than pain, will check labs w/ electrolytes, trop x1, TSH. reasses

## 2021-07-19 ENCOUNTER — EMERGENCY (EMERGENCY)
Facility: HOSPITAL | Age: 36
LOS: 1 days | Discharge: LEFT BEFORE DISCHARGE | End: 2021-07-19
Attending: STUDENT IN AN ORGANIZED HEALTH CARE EDUCATION/TRAINING PROGRAM
Payer: MEDICAID

## 2021-07-19 VITALS
RESPIRATION RATE: 18 BRPM | HEART RATE: 106 BPM | TEMPERATURE: 99 F | OXYGEN SATURATION: 99 % | DIASTOLIC BLOOD PRESSURE: 75 MMHG | SYSTOLIC BLOOD PRESSURE: 137 MMHG | WEIGHT: 153.22 LBS | HEIGHT: 67 IN

## 2021-07-19 PROCEDURE — 99281 EMR DPT VST MAYX REQ PHY/QHP: CPT

## 2021-07-19 PROCEDURE — L9991: CPT

## 2021-07-19 NOTE — ED ADULT NURSE NOTE - CHIEF COMPLAINT QUOTE
patient states that she is 16 weeks pregnant complaining of abdominal a pain and wants her bp checked

## 2021-07-19 NOTE — ED ADULT TRIAGE NOTE - INTERNATIONAL TRAVEL
No Siliq Counseling:  I discussed with the patient the risks of Siliq including but not limited to new or worsening depression, suicidal thoughts and behavior, immunosuppression, malignancy, posterior leukoencephalopathy syndrome, and serious infections.  The patient understands that monitoring is required including a PPD at baseline and must alert us or the primary physician if symptoms of infection or other concerning signs are noted. There is also a special program designed to monitor depression which is required with Siliq.

## 2021-09-03 ENCOUNTER — OUTPATIENT (OUTPATIENT)
Dept: INPATIENT UNIT | Facility: HOSPITAL | Age: 36
LOS: 1 days | End: 2021-09-03
Payer: MEDICAID

## 2021-09-03 VITALS
SYSTOLIC BLOOD PRESSURE: 147 MMHG | DIASTOLIC BLOOD PRESSURE: 81 MMHG | HEART RATE: 103 BPM | RESPIRATION RATE: 20 BRPM | TEMPERATURE: 98 F

## 2021-09-03 DIAGNOSIS — O47.02 FALSE LABOR BEFORE 37 COMPLETED WEEKS OF GESTATION, SECOND TRIMESTER: ICD-10-CM

## 2021-09-03 PROCEDURE — G0463: CPT

## 2021-09-03 PROCEDURE — 81003 URINALYSIS AUTO W/O SCOPE: CPT

## 2021-09-03 PROCEDURE — 59025 FETAL NON-STRESS TEST: CPT

## 2021-09-03 NOTE — OB PROVIDER TRIAGE NOTE - NS_STATION_OBGYN_ALL_OB_NU
Breath sounds are clear, no distress present, no wheeze, rales, rhonchi or tachypnea. Normal rate and effort. -3

## 2021-09-03 NOTE — OB PROVIDER TRIAGE NOTE - NSOBPROVIDERNOTE_OBGYN_ALL_OB_FT
35y G10P at 23w1d  GA by first trimester sono who presents to L&D for chest pressure with associated shortness of breath.     A/P:   - Send UA  - 1st BP reading elevated all repeats wnl. Other vitals wnl.  - Reactive tracing. Not kimberly   - SVE: closed/thick/high  - Stable obstetrically.   - Recommend transfer to ED for further follow up of chest pain.   - Precautions given  - Reconsult as necessary.     Discussed with Dr. Rosario 35y G10P at 23w1d  GA by first trimester sono who presents to L&D for chest pressure with associated shortness of breath.     A/P:   - UA -> negative   - 1st BP reading elevated all repeats wnl. Other vitals wnl.  - Reactive tracing. Not kimberly   - SVE: closed/thick/high  - Stable obstetrically.   - Recommend transfer to ED for further follow up of chest pain.   - Precautions given  - Reconsult as necessary.     Discussed with Dr. Rosario

## 2021-09-03 NOTE — OB PROVIDER TRIAGE NOTE - NSHPLABSRESULTS_GEN_ALL_CORE
UA pending Urinalysis (09.04.21 @ 00:11)    Glucose Qualitative, Urine: Negative mg/dL    Blood, Urine: Negative    pH Urine: 7.0    Color: Yellow    Urine Appearance: Clear    Bilirubin: Negative    Ketone - Urine: Negative    Specific Gravity: 1.015    Protein, Urine: Negative mg/dL    Urobilinogen: 8 mg/dL    Nitrite: Negative    Leukocyte Esterase Concentration: Negative

## 2021-09-03 NOTE — OB PROVIDER TRIAGE NOTE - HISTORY OF PRESENT ILLNESS
35y G_P_ at _ weeks GA by LMP consistent with _ trimester sono who presents to L&D for _. Patient denies vaginal bleeding, contractions and leakage of fluid. She endorses good fetal movement. Denies fevers, chills, nausea, vomiting, chest pain, SOB, dizziness and headache. No other complaints at this time.   SEAN: _  LMP: _  Prenatal course is significant for:  Prenatal course uncomplicated.      POB:  PGYN: -fibroids, -ovarian cysts, denies STD hx, denies abnormal PAPs   PMH: Denies  PSH: Denies  SH: Denies EtOH, tobacco and illicit drug use during this pregnancy; feels safe at home   Meds: PNVs  Allergies: NKDA    BMI:  Sono:  EFW:     T(C): --  HR: 95 (09-03-21 @ 23:44) (95 - 103)  BP: 132/83 (09-03-21 @ 23:44) (129/80 - 147/81)  RR: 20 (09-03-21 @ 23:12) (20 - 20)  SpO2: --    Gen: NAD, well-appearing, AAOx3   Abd: Soft, gravid  Ext: non-tender, non-edematous  SSE:   SVE:    Bedside sono:  FHT:  [place]:       A/P:   -Admit to L&D  -Consent  -Admission labs  -NPO, except ice chips   -IV fluids  -Labor: Intact/*ROM. Latent/Active labor. Archie *.   -Fetus: Cat I tracing. Continuous toco and fetal monitoring.   -GBS: Negative, no GBS ppx required   -Analgesia:     Discussed with  _ 35y G10P at 23w1d  GA by first trimester sonprincess who presents to L&D for chest pressure with associated shortness of breath. She states feeling intermittent episodes of chest pain earlier today. Of note, patient has history of anxiety and has had 4 recent deaths in her family within the last week.  She also has history for cHTN which she states has been well controlled without medication . She was prescribed blood pressure medications in the past but is not aware of the name. She follows up with cardiologist Dr. Posada for "leaky valves" which she believes to be mitral valve regurgitation.  She endorses abdominal pressure that feels crampy and some increased urine urgency. She recently finished a course of antibiotics for a UTI. Patient denies vaginal bleeding, contractions and leakage of fluid. She endorses good fetal movement.  Endorses nausea and mild headache. Denies fevers, chills, dysuria, or vomiting. No other complaints at this time.   SEAN: 21  LMP: 3/26/21  Prenatal course is significant for:  -anemia s/p 5 iron transfusions  -cHTN      POB:  x4 (, , , 2018), Hx of 10 terminations  PGYN: -fibroids, +ovarian cysts, denies STD hx, denies abnormal PAPs   PMH: Anxiety, cHTN, MVR  PSH: D&C, Left breast cyst   SH: Denies EtOH, tobacco and illicit drug use during this pregnancy; feels safe at home   Meds: PNVs  Allergies: NKDA   35y G10P at 23w1d  GA by first trimester sonprincess who presents to L&D for chest pressure with associated shortness of breath. She states feeling intermittent episodes of chest pain earlier today. Of note, patient has history of anxiety and has had 4 recent deaths in her family within the last week.  She also has history for cHTN which she states has been well controlled without medication . She was prescribed blood pressure medications in the past but is not aware of the name. She follows up with cardiologist Dr. Posada for "leaky valves" which she believes to be mitral valve regurgitation.  She endorses abdominal pressure that feels crampy and some increased urine urgency. She recently finished a course of antibiotics for a UTI. Patient denies vaginal bleeding, contractions and leakage of fluid. She endorses good fetal movement.  Endorses nausea and mild headache. Denies fevers, chills, dysuria, or vomiting. No other complaints at this time.   SEAN: 21  LMP: 3/26/21  Prenatal course is significant for:  -anemia s/p 5 iron transfusions  -cHTN      POB:  x4 (, , , ), Hx of 10 terminations  PGYN: -fibroids, +ovarian cysts, denies STD hx, denies abnormal PAPs   PMH: Anxiety, cHTN, MVR  PSH: D&C 2016, Left breast cyst   SH: Denies EtOH, tobacco and illicit drug use during this pregnancy; feels safe at home   Meds: PNVs  Allergies: NKDA

## 2021-09-03 NOTE — OB PROVIDER TRIAGE NOTE - NSHPPHYSICALEXAM_GEN_ALL_CORE
T(C): --  HR: 95 (09-03-21 @ 23:44) (95 - 103)  BP: 132/83 (09-03-21 @ 23:44) (129/80 - 147/81)  RR: 20 (09-03-21 @ 23:12) (20 - 20)  SpO2: --    Gen: NAD, well-appearing, AAOx3   Abd: Soft, gravid  Ext: non-tender, non-edematous  SVE: 0/thick/-high  FHT: 150 bpm, moderate variability, +accels, -deccel  [place]: none

## 2021-09-04 ENCOUNTER — EMERGENCY (EMERGENCY)
Facility: HOSPITAL | Age: 36
LOS: 1 days | Discharge: HOME | End: 2021-09-04
Attending: EMERGENCY MEDICINE
Payer: MEDICAID

## 2021-09-04 VITALS
OXYGEN SATURATION: 99 % | RESPIRATION RATE: 18 BRPM | HEART RATE: 95 BPM | SYSTOLIC BLOOD PRESSURE: 159 MMHG | TEMPERATURE: 99 F | HEIGHT: 67 IN | WEIGHT: 160.06 LBS | DIASTOLIC BLOOD PRESSURE: 92 MMHG

## 2021-09-04 VITALS — TEMPERATURE: 98 F | HEART RATE: 88 BPM | DIASTOLIC BLOOD PRESSURE: 85 MMHG | SYSTOLIC BLOOD PRESSURE: 125 MMHG

## 2021-09-04 LAB
APPEARANCE UR: CLEAR — SIGNIFICANT CHANGE UP
BILIRUB UR-MCNC: NEGATIVE — SIGNIFICANT CHANGE UP
COLOR SPEC: YELLOW — SIGNIFICANT CHANGE UP
DIFF PNL FLD: NEGATIVE — SIGNIFICANT CHANGE UP
GLUCOSE UR QL: NEGATIVE MG/DL — SIGNIFICANT CHANGE UP
KETONES UR-MCNC: NEGATIVE — SIGNIFICANT CHANGE UP
LEUKOCYTE ESTERASE UR-ACNC: NEGATIVE — SIGNIFICANT CHANGE UP
NITRITE UR-MCNC: NEGATIVE — SIGNIFICANT CHANGE UP
PH UR: 7 — SIGNIFICANT CHANGE UP (ref 5–8)
PROT UR-MCNC: NEGATIVE MG/DL — SIGNIFICANT CHANGE UP
SP GR SPEC: 1.01 — SIGNIFICANT CHANGE UP (ref 1.01–1.02)
UROBILINOGEN FLD QL: 8 MG/DL

## 2021-09-04 PROCEDURE — 99284 EMERGENCY DEPT VISIT MOD MDM: CPT

## 2021-09-04 PROCEDURE — 93005 ELECTROCARDIOGRAM TRACING: CPT

## 2021-09-04 PROCEDURE — 99283 EMERGENCY DEPT VISIT LOW MDM: CPT

## 2021-09-04 PROCEDURE — 93010 ELECTROCARDIOGRAM REPORT: CPT

## 2021-09-04 NOTE — ED PROVIDER NOTE - TEMPLATE, MLM
I have to be in a meeting [place], have to leave soon., she can go to the urgent care clinic otherwise double book me at 8:30 tomorrow morning, she can come at around 8:15 tomorrow morning and she needs an EKG first.   General

## 2021-09-04 NOTE — ED PROVIDER NOTE - PATIENT PORTAL LINK FT
You can access the FollowMyHealth Patient Portal offered by [facility] by registering at the following website: [email or web address]. By joining [facility]’s FollowMyHealth portal, you will also be able to view your health information using other applications (apps) compatible with our system.

## 2022-02-01 ENCOUNTER — EMERGENCY (EMERGENCY)
Facility: HOSPITAL | Age: 37
LOS: 1 days | Discharge: HOME | End: 2022-02-01
Payer: MEDICAID

## 2022-02-01 ENCOUNTER — OUTPATIENT (OUTPATIENT)
Dept: OUTPATIENT SERVICES | Facility: HOSPITAL | Age: 37
LOS: 1 days | End: 2022-02-01
Payer: MEDICAID

## 2022-02-01 VITALS
HEART RATE: 90 BPM | DIASTOLIC BLOOD PRESSURE: 136 MMHG | RESPIRATION RATE: 18 BRPM | WEIGHT: 154.1 LBS | HEIGHT: 67 IN | TEMPERATURE: 98 F | OXYGEN SATURATION: 98 % | SYSTOLIC BLOOD PRESSURE: 199 MMHG

## 2022-02-01 VITALS — DIASTOLIC BLOOD PRESSURE: 88 MMHG | HEART RATE: 63 BPM | SYSTOLIC BLOOD PRESSURE: 133 MMHG

## 2022-02-01 VITALS
RESPIRATION RATE: 14 BRPM | SYSTOLIC BLOOD PRESSURE: 175 MMHG | HEART RATE: 75 BPM | TEMPERATURE: 98 F | DIASTOLIC BLOOD PRESSURE: 118 MMHG

## 2022-02-01 PROBLEM — D50.9 IRON DEFICIENCY ANEMIA, UNSPECIFIED: Chronic | Status: ACTIVE | Noted: 2021-09-03

## 2022-02-01 PROCEDURE — G0463: CPT

## 2022-02-01 PROCEDURE — L9991: CPT

## 2022-02-01 PROCEDURE — 93010 ELECTROCARDIOGRAM REPORT: CPT

## 2022-02-01 PROCEDURE — 99281 EMR DPT VST MAYX REQ PHY/QHP: CPT

## 2022-02-01 PROCEDURE — 93005 ELECTROCARDIOGRAM TRACING: CPT

## 2022-02-01 RX ORDER — NIFEDIPINE 30 MG
10 TABLET, EXTENDED RELEASE 24 HR ORAL ONCE
Refills: 0 | Status: COMPLETED | OUTPATIENT
Start: 2022-02-01 | End: 2022-02-01

## 2022-02-01 RX ORDER — NIFEDIPINE 30 MG
30 TABLET, EXTENDED RELEASE 24 HR ORAL ONCE
Refills: 0 | Status: COMPLETED | OUTPATIENT
Start: 2022-02-01 | End: 2022-02-01

## 2022-02-01 RX ORDER — NIFEDIPINE 30 MG
1 TABLET, EXTENDED RELEASE 24 HR ORAL
Qty: 14 | Refills: 0
Start: 2022-02-01 | End: 2022-02-14

## 2022-02-01 RX ADMIN — Medication 10 MILLIGRAM(S): at 13:04

## 2022-02-01 RX ADMIN — Medication 30 MILLIGRAM(S): at 15:19

## 2022-02-01 NOTE — OB RN TRIAGE NOTE - FALL HARM RISK - UNIVERSAL INTERVENTIONS
Bed in lowest position, wheels locked, appropriate side rails in place/Call bell, personal items and telephone in reach/Instruct patient to call for assistance before getting out of bed or chair/Non-slip footwear when patient is out of bed/[place] to call system/Physically safe environment - no spills, clutter or unnecessary equipment/Purposeful Proactive Rounding/Room/bathroom lighting operational, light cord in reach

## 2022-02-01 NOTE — OB RN TRIAGE NOTE - CHIEF COMPLAINT QUOTE
"my blood pressure was high at home so I came here. They sent me here after going to the emergency department. I have a headache and chest pain"

## 2022-02-01 NOTE — OB PROVIDER TRIAGE NOTE - NSOBPROVIDERNOTE_OBGYN_ALL_OB_FT
A/P: 36y  presented from the ED on PPD#46 s/p  at 38wks c/b PECwSF s/p Mag for severe range BPs.     Severe range BPs decreased with procardia 10mg. Continue to cycle BPs.  Contacted pt pharmacy, pt was prescribed methyldopa 500mg BID by OBGYN, however no longer carried by pharmacy. Patient states she follows with cardiology, she has an appointment schedule for next week.      Discussed with Dr. Frederick. A/P: 36y  presented from the ED on PPD#46 s/p  at 38wks c/b PECwSF s/p Mag for severe range BPs.     Severe range BPs decreased with procardia 10mg. Continue to cycle BPs.  Contacted pt pharmacy, pt was prescribed methyldopa 500mg BID by OBGYN, however no longer carried by pharmacy. Patient states she follows with cardiology, she has an appointment schedule for next week.      Pt normotensive after procardia 10mg, received procardia 30mg ER. Sent procardia to [facility] pharmacy and patient will follow up with cardiology outpatient.    Discussed with Dr. Frederick.

## 2022-02-01 NOTE — OB PROVIDER TRIAGE NOTE - NSHPPHYSICALEXAM_GEN_ALL_CORE
T(C): 36.8 (02-01-22 @ 11:40), Max: 36.8 (02-01-22 @ 11:40)  HR: 68 (02-01-22 @ 13:00) (68 - 90)  BP: 158/105 (02-01-22 @ 13:00) (158/105 - 199/136)  RR: 18 (02-01-22 @ 11:40) (18 - 18)  SpO2: 98% (02-01-22 @ 11:40) (98% - 98%)    Gen: NAD, well-appearing, AAOx3   Abd: Soft, no RUQ pain  Resp: breathing comfortably on RA  Ext: non-tender, non-edematous

## 2022-02-01 NOTE — OB RN TRIAGE NOTE - NSICDXPASTMEDICALHX_GEN_ALL_CORE_FT
PAST MEDICAL HISTORY:  HTN (hypertension)     Iron deficiency anemia      (normal spontaneous vaginal delivery) x5

## 2022-02-01 NOTE — OB PROVIDER TRIAGE NOTE - HISTORY OF PRESENT ILLNESS
36y  PPD#46 s/p  at 38wks c/b PECwSF s/p Mag. Patient was discharged on labetalol 200mg BID. She states she did not like how the labetalol made her feel so she discontinued 4 days ago.      GA by LMP consistent with _ trimester sono who presents to L&D for _. Patient denies vaginal bleeding, contractions and leakage of fluid. She endorses good fetal movement. Denies fevers, chills, nausea, vomiting, chest pain, SOB, dizziness and headache. No other complaints at this time.     SEAN: _  LMP: _    Prenatal course is significant for:  Prenatal course uncomplicated.      POB:  PGYN: -fibroids, -ovarian cysts, denies STD hx, denies abnormal PAPs   PMH: Denies  PSH: Denies  SH: Denies EtOH, tobacco and illicit drug use during this pregnancy; feels safe at home   Meds: PNVs  Allergies: NKDA    T(C): 36.8 (22 @ 11:40), Max: 36.8 (22 @ 11:40)  HR: 68 (22 @ 13:00) (68 - 90)  BP: 158/105 (22 @ 13:00) (158/105 - 199/136)  RR: 18 (22 @ 11:40) (18 - 18)  SpO2: 98% (22 @ 11:40) (98% - 98%)    Gen: NAD, well-appearing, AAOx3   Abd: Soft, gravid  Ext: non-tender, non-edematous  SSE:   SVE:    Bedside sono:  FHT:  [place]:     A/P:   -Admit to L&D  -Consent  -Admission labs  -NPO, except ice chips   -IV fluids  -Labor: Intact/*ROM. Latent/Active labor. [place] *.   -Fetus: Cat I tracing. Continuous toco and fetal monitoring.   -GBS: Negative, no GBS ppx required   -Analgesia:     Discussed with  _ 36y  presented from the ED on PPD#46 s/p  at 38wks c/b PECwSF s/p Mag for severe range BPs. Patient was discharged on labetalol 200mg BID. She states she did not like how the labetalol made her feel so she discontinued 4 days ago. Patient was seen by OB for her 6wk PP visit and switched to hydralazine, however patient states her pharmacy did not have the medication so she was unable to pick it up. Patient denies    GA by LMP consistent with _ trimester sono who presents to L&D for _. Patient denies vaginal bleeding, contractions and leakage of fluid. She endorses good fetal movement. Denies fevers, chills, nausea, vomiting, chest pain, SOB, dizziness and headache. No other complaints at this time.     SEAN: _  LMP: _    Prenatal course is significant for:  Prenatal course uncomplicated.      POB:  PGYN: -fibroids, -ovarian cysts, denies STD hx, denies abnormal PAPs   PMH: Denies  PSH: Denies  SH: Denies EtOH, tobacco and illicit drug use during this pregnancy; feels safe at home   Meds: PNVs  Allergies: NKDA    T(C): 36.8 (22 @ 11:40), Max: 36.8 (22 @ 11:40)  HR: 68 (22 @ 13:00) (68 - 90)  BP: 158/105 (22 @ 13:00) (158/105 - 199/136)  RR: 18 (22 @ 11:40) (18 - 18)  SpO2: 98% (22 @ 11:40) (98% - 98%)    Gen: NAD, well-appearing, AAOx3   Abd: Soft, gravid  Ext: non-tender, non-edematous  SSE:   SVE:    Bedside sono:  FHT:  [place]:     A/P:   -Admit to L&D  -Consent  -Admission labs  -NPO, except ice chips   -IV fluids  -Labor: Intact/*ROM. Latent/Active labor. [place] *.   -Fetus: Cat I tracing. Continuous toco and fetal monitoring.   -GBS: Negative, no GBS ppx required   -Analgesia:     Discussed with  _ 36y  presented from the ED on PPD#46 s/p  at 38wks c/b PECwSF s/p Mag for severe range BPs. Patient was discharged on labetalol 200mg BID. She states she did not like how the labetalol made her feel so she discontinued 4 days ago. Patient was seen by OB for her 6wk PP visit and switched to hydralazine, however patient states her pharmacy did not have the medication so she was unable to pick it up. Patient denies HA, changes in vision, CP, SOB, RUQ pain, nausea and vomiting.     Delivery date:     POB: NSVDx5 ( c/b gHTN, , , , )  PGYN: +hx ovarian cyst rupture. -fibroids, denies STD hx, denies abnormal PAPs   PMH: anxiety  PSH: L breast lumpectomy  SH: Denies EtOH, tobacco and illicit drug   Meds: denies  Allergies: NKDA

## 2022-02-01 NOTE — ED ADULT TRIAGE NOTE - CHIEF COMPLAINT QUOTE
pt states she checked her BP at home 164/117 & 160/114, c/o headache & dizziness, postpartum 6 weeks,  bp still elevated 199/136  A&Ox3, resp wnl, c/o right temporal HA

## 2022-04-05 NOTE — ED STATDOCS - CHPI ED SYMPTOM NEG
"CC:  presents with Pharyngitis            Pharyngitis   This is a new problem. The current episode started in the past 3 days. The problem has been unchanged. There has been subj fever. The pain is mild.  Denies cough.    Pertinent negatives include no abdominal pain,   diarrhea, headaches, shortness of breath or vomiting. no exposure to strep or mono.   has tried acetaminophen for the symptoms. The treatment provided mild relief.     Social History     Tobacco Use   • Smoking status: Never Smoker   • Smokeless tobacco: Never Used   Vaping Use   • Vaping Use: Never used   Substance Use Topics   • Alcohol use: Yes     Comment: rare   • Drug use: Never       Past Medical History:   Diagnosis Date   • Anemia     during preg   • Anxiety    • Major depressive disorder with current active episode 7/8/2021       Review of Systems    HENT: Positive for sore throat  Respiratory: Negative for  sputum production and shortness of breath.    Cardiovascular: Negative for chest pain.   Gastrointestinal: Negative for nausea, vomiting, abdominal pain and diarrhea.   Genitourinary: Negative.    Neurological: Negative for dizziness and headaches.   All other systems reviewed and are negative.         Objective:   Ht 1.676 m (5' 6\")         Physical Exam   Constitutional:   oriented to person, place, and time.  appears well-developed and well-nourished. No distress.   HENT:   Head: Normocephalic and atraumatic.   Right Ear: External ear normal.   Left Ear: External ear normal.   Nose: Mucosal edema present. Right sinus exhibits no maxillary sinus tenderness and no frontal sinus tenderness. Left sinus exhibits no maxillary sinus tenderness and no frontal sinus tenderness.   Mouth/Throat: no posterior oropharyngeal exudate.   There is posterior oropharyngeal erythema present. No posterior oropharyngeal edema.   Tonsils 2+ bilaterally     Eyes: Conjunctivae and EOM are normal. Pupils are equal, round, and reactive to light. Right eye " exhibits no discharge. Left eye exhibits no discharge. No scleral icterus.   Neck: Normal range of motion. Neck supple. No JVD present. No tracheal deviation present. No thyromegaly present.   Cardiovascular: Normal rate, regular rhythm, normal heart sounds and intact distal pulses.  Exam reveals no friction rub.    No murmur heard.  Pulmonary/Chest: Effort normal and breath sounds normal. No respiratory distress.   no wheezes.   no rales.    Musculoskeletal:  exhibits no edema.   Lymphadenopathy:    no cervical LAD  Neurological:   alert and oriented to person, place, and time.   Skin: Skin is warm and dry. No erythema.   Psychiatric:   normal mood and affect.   Nursing note and vitals reviewed.             Assessment/Plan:     1. Sore throat   Pt presents with sore throat and systemic symptoms (fever)      Rapid strep   negative.      Will send screen for COVID  Home isolation per CDC guidelines  rx motrin 800mg tid prn  Return to clinic if symptoms worsen     no vomiting/dysuria, hematuria/no fever

## 2022-04-27 NOTE — ED ADULT NURSE NOTE - CAS EDN DISCHARGE INTERVENTIONS
Addended by: Kobe Gandhi on: 4/27/2022 04:54 AM     Modules accepted: Orders
IV discontinued, cath removed intact

## 2022-05-04 ENCOUNTER — EMERGENCY (EMERGENCY)
Facility: HOSPITAL | Age: 37
LOS: 1 days | Discharge: HOME | End: 2022-05-04
Attending: EMERGENCY MEDICINE
Payer: MEDICAID

## 2022-05-04 VITALS
HEIGHT: 67 IN | OXYGEN SATURATION: 98 % | DIASTOLIC BLOOD PRESSURE: 92 MMHG | SYSTOLIC BLOOD PRESSURE: 134 MMHG | RESPIRATION RATE: 18 BRPM | TEMPERATURE: 98 F | HEART RATE: 85 BPM | WEIGHT: 158.07 LBS

## 2022-05-04 LAB
ALBUMIN SERPL ELPH-MCNC: 4.6 G/DL — SIGNIFICANT CHANGE UP (ref 3.3–5.2)
ALP SERPL-CCNC: 80 U/L — SIGNIFICANT CHANGE UP (ref 40–120)
ALT FLD-CCNC: 12 U/L — SIGNIFICANT CHANGE UP
ANION GAP SERPL CALC-SCNC: 13 MMOL/L — SIGNIFICANT CHANGE UP (ref 5–17)
AST SERPL-CCNC: 14 U/L — SIGNIFICANT CHANGE UP
BILIRUB SERPL-MCNC: 0.3 MG/DL — LOW (ref 0.4–2)
BUN SERPL-MCNC: 12.8 MG/DL — SIGNIFICANT CHANGE UP (ref 8–20)
CALCIUM SERPL-MCNC: 9.6 MG/DL — SIGNIFICANT CHANGE UP (ref 8.6–10.2)
CHLORIDE SERPL-SCNC: 104 MMOL/L — SIGNIFICANT CHANGE UP (ref 98–107)
CO2 SERPL-SCNC: 25 MMOL/L — SIGNIFICANT CHANGE UP (ref 22–29)
CREAT SERPL-MCNC: 0.67 MG/DL — SIGNIFICANT CHANGE UP (ref 0.5–1.3)
EGFR: 116 ML/MIN/1.73M2 — SIGNIFICANT CHANGE UP
GLUCOSE SERPL-MCNC: 70 MG/DL — SIGNIFICANT CHANGE UP (ref 70–99)
HCT VFR BLD CALC: 34.4 % — LOW (ref 34.5–45)
HGB BLD-MCNC: 11.4 G/DL — LOW (ref 11.5–15.5)
MCHC RBC-ENTMCNC: 29.5 PG — SIGNIFICANT CHANGE UP (ref 27–34)
MCHC RBC-ENTMCNC: 33.1 GM/DL — SIGNIFICANT CHANGE UP (ref 32–36)
MCV RBC AUTO: 89.1 FL — SIGNIFICANT CHANGE UP (ref 80–100)
NT-PROBNP SERPL-SCNC: 187 PG/ML — SIGNIFICANT CHANGE UP (ref 0–300)
PLATELET # BLD AUTO: 289 K/UL — SIGNIFICANT CHANGE UP (ref 150–400)
POTASSIUM SERPL-MCNC: 4.1 MMOL/L — SIGNIFICANT CHANGE UP (ref 3.5–5.3)
POTASSIUM SERPL-SCNC: 4.1 MMOL/L — SIGNIFICANT CHANGE UP (ref 3.5–5.3)
PROT SERPL-MCNC: 7.6 G/DL — SIGNIFICANT CHANGE UP (ref 6.6–8.7)
RBC # BLD: 3.86 M/UL — SIGNIFICANT CHANGE UP (ref 3.8–5.2)
RBC # FLD: 13.8 % — SIGNIFICANT CHANGE UP (ref 10.3–14.5)
SODIUM SERPL-SCNC: 142 MMOL/L — SIGNIFICANT CHANGE UP (ref 135–145)
WBC # BLD: 7.2 K/UL — SIGNIFICANT CHANGE UP (ref 3.8–10.5)
WBC # FLD AUTO: 7.2 K/UL — SIGNIFICANT CHANGE UP (ref 3.8–10.5)

## 2022-05-04 PROCEDURE — 93005 ELECTROCARDIOGRAM TRACING: CPT

## 2022-05-04 PROCEDURE — 99285 EMERGENCY DEPT VISIT HI MDM: CPT

## 2022-05-04 PROCEDURE — 83880 ASSAY OF NATRIURETIC PEPTIDE: CPT

## 2022-05-04 PROCEDURE — 36415 COLL VENOUS BLD VENIPUNCTURE: CPT

## 2022-05-04 PROCEDURE — 99283 EMERGENCY DEPT VISIT LOW MDM: CPT

## 2022-05-04 PROCEDURE — 85027 COMPLETE CBC AUTOMATED: CPT

## 2022-05-04 PROCEDURE — 82962 GLUCOSE BLOOD TEST: CPT

## 2022-05-04 PROCEDURE — 93010 ELECTROCARDIOGRAM REPORT: CPT

## 2022-05-04 PROCEDURE — 80053 COMPREHEN METABOLIC PANEL: CPT

## 2022-05-04 RX ORDER — ACETAMINOPHEN 500 MG
650 TABLET ORAL ONCE
Refills: 0 | Status: COMPLETED | OUTPATIENT
Start: 2022-05-04 | End: 2022-05-04

## 2022-05-04 RX ORDER — ONDANSETRON 8 MG/1
4 TABLET, FILM COATED ORAL ONCE
Refills: 0 | Status: COMPLETED | OUTPATIENT
Start: 2022-05-04 | End: 2022-05-04

## 2022-05-04 RX ADMIN — ONDANSETRON 4 MILLIGRAM(S): 8 TABLET, FILM COATED ORAL at 19:58

## 2022-05-04 RX ADMIN — Medication 650 MILLIGRAM(S): at 19:58

## 2022-05-04 NOTE — ED ADULT TRIAGE NOTE - CHIEF COMPLAINT QUOTE
sent by MD for "low sugar", (64 glucose serum) no hx DM. pt states she has been feeling intermittent dizziness, headache, palpitations, SOB ; hx iron def anemia (last infusion 2/2022)

## 2022-05-04 NOTE — ED STATDOCS - OBJECTIVE STATEMENT
37 y/o female with PMHx of HTN, iron deficiency anemia presents to the ED after she states she had blood work a few days that showed glucose of 64 and was told to come in to be evaluated due to some dizziness. Pt also notes some SOB as well which states she follows with cards for, regarding "leaky valves" which she is supposed to follow-up with them.

## 2022-05-04 NOTE — ED STATDOCS - PATIENT PORTAL LINK FT
You can access the FollowMyHealth Patient Portal offered by [facility] by registering at the following website: [email or web address]. By joining [facility]’s FollowMyHealth portal, you will also be able to view your health information using other applications (apps) compatible with our system.

## 2022-05-04 NOTE — ED STATDOCS - NS ED ATTENDING STATEMENT MOD
This was a shared visit with the EDSON. I reviewed and verified the documentation and independently performed the documented:

## 2022-05-04 NOTE — ED STATDOCS - PROGRESS NOTE DETAILS
POLO_ PT evaluated by intake physician. HPI/PE/ROS as noted above. Will follow up plan per intake physician POLO- pt with glucose on bmp of 70, admits she does not eat much throughout the day, discussed more frequent eating, mild headache with some nausea will medicate, Pt reassessed, pt feeling better at this time, vss, pt able to walk, talk and vocalized plan of action. Discussed in depth and explained to pt in depth the next steps that need to be taking including proper follow up with PCP or specialists. All incidental findings were discussed with pt as well. Pt verbalized their concerns and all questions were answered. Pt understands dispo and wants discharge. Given good instructions when to return to ED and importance of f/u.

## 2022-05-25 ENCOUNTER — EMERGENCY (EMERGENCY)
Facility: HOSPITAL | Age: 37
LOS: 1 days | Discharge: HOME | End: 2022-05-25
Attending: EMERGENCY MEDICINE
Payer: MEDICAID

## 2022-05-25 VITALS
OXYGEN SATURATION: 96 % | HEIGHT: 67 IN | HEART RATE: 98 BPM | WEIGHT: 156.09 LBS | TEMPERATURE: 100 F | DIASTOLIC BLOOD PRESSURE: 107 MMHG | SYSTOLIC BLOOD PRESSURE: 147 MMHG | RESPIRATION RATE: 20 BRPM

## 2022-05-25 PROCEDURE — 99284 EMERGENCY DEPT VISIT MOD MDM: CPT

## 2022-05-25 RX ORDER — ACETAMINOPHEN 500 MG
650 TABLET ORAL ONCE
Refills: 0 | Status: COMPLETED | OUTPATIENT
Start: 2022-05-25 | End: 2022-05-25

## 2022-05-25 RX ORDER — ONDANSETRON 8 MG/1
4 TABLET, FILM COATED ORAL ONCE
Refills: 0 | Status: COMPLETED | OUTPATIENT
Start: 2022-05-25 | End: 2022-05-25

## 2022-05-25 RX ORDER — LIDOCAINE 4 G/100G
1 CREAM TOPICAL ONCE
Refills: 0 | Status: COMPLETED | OUTPATIENT
Start: 2022-05-25 | End: 2022-05-25

## 2022-05-25 RX ADMIN — Medication 100 MILLIGRAM(S): at 23:58

## 2022-05-25 RX ADMIN — Medication 650 MILLIGRAM(S): at 23:58

## 2022-05-25 RX ADMIN — ONDANSETRON 4 MILLIGRAM(S): 8 TABLET, FILM COATED ORAL at 23:58

## 2022-05-25 NOTE — ED PROVIDER NOTE - ATTENDING APP SHARED VISIT CONTRIBUTION OF CARE
The patient seen and examined    Viral Syndrome    I, Lonnie Posada, performed the initial face to face bedside interview with this patient regarding history of present illness, review of symptoms and relevant past medical, social and family history.  I completed an independent physical examination.  I was the initial provider who evaluated this patient. I have signed out the follow up of any pending tests (i.e. labs, radiological studies) to the ACP.  I have communicated the patient’s plan of care and disposition with the ACP.

## 2022-05-25 NOTE — ED PROVIDER NOTE - PATIENT PORTAL LINK FT
You can access the FollowMyHealth Patient Portal offered by [facility] by registering at the following website: [email or web address]. By joining [employer]’s FollowMyHealth portal, you will also be able to view your health information using other applications (apps) compatible with our system.

## 2022-05-25 NOTE — ED PROVIDER NOTE - CLINICAL SUMMARY MEDICAL DECISION MAKING FREE TEXT BOX
36 year old female with pmhx of htn (has not taken meds x 2 days) presents to ED c/o cough, congestion, nausea, body aches x 3 days  - rvp, tessalon, tylenol

## 2022-05-25 NOTE — ED PROVIDER NOTE - OBJECTIVE STATEMENT
36 year old female with pmhx of htn (has not taken meds x 2 days) presents to ED c/o cough, congestion, nausea, body aches x 3 days. admits to + sick contacts,  and child at home with similar symptoms. She is not covid or flu vaccinated. she denies chest pain and sob. she denies fever/chills, v/d. she is tolerating PO, has been eating and drinking without issue.

## 2022-05-25 NOTE — ED ADULT TRIAGE NOTE - CHIEF COMPLAINT QUOTE
C/O cough, fever, and nausea without vomiting since Sunday.  Family members in the house with similar symptoms.

## 2022-05-25 NOTE — ED PROVIDER NOTE - NSFOLLOWUPINSTRUCTIONS_ED_ALL_ED_FT
Follow up with PCP within 1 week   Take tylenol for fevers at home   Take tessalon for cough daily     Return if new or worsening symptoms     - Follow up with your doctor within 2-3 days.   - Take Tylenol (Acetaminophen) 650mg or Motrin (Ibuprofen/Advil) 600mg every 6 hours as needed for pain.   - Stay well hydrated.   - Stay at home until you receive test results.   - Return to the ED for any new or worsening symptoms.     Viral Respiratory Infection    A viral respiratory infection is an illness that affects parts of the body used for breathing, like the lungs, nose, and throat. It is caused by a germ called a virus. Symptoms can include runny nose, coughing, sneezing, fatigue, body aches, sore throat, fever, or headache. Over the counter medicine can be used to manage the symptoms but the infection typically goes away on its own in 5 to 10 days.     SEEK IMMEDIATE MEDICAL CARE IF YOU HAVE ANY OF THE FOLLOWING SYMPTOMS: shortness of breath, chest pain, fever over 10 days, or lightheadedness/dizziness.

## 2022-05-26 LAB
B PERT DNA SPEC QL NAA+PROBE: SIGNIFICANT CHANGE UP
C PNEUM DNA SPEC QL NAA+PROBE: SIGNIFICANT CHANGE UP
FLUAV H1 2009 PAND RNA SPEC QL NAA+PROBE: SIGNIFICANT CHANGE UP
FLUAV H1 RNA SPEC QL NAA+PROBE: SIGNIFICANT CHANGE UP
FLUAV H3 RNA SPEC QL NAA+PROBE: SIGNIFICANT CHANGE UP
FLUAV SUBTYP SPEC NAA+PROBE: SIGNIFICANT CHANGE UP
FLUBV RNA SPEC QL NAA+PROBE: SIGNIFICANT CHANGE UP
HADV DNA SPEC QL NAA+PROBE: SIGNIFICANT CHANGE UP
HCOV PNL SPEC NAA+PROBE: SIGNIFICANT CHANGE UP
HMPV RNA SPEC QL NAA+PROBE: SIGNIFICANT CHANGE UP
HPIV1 RNA SPEC QL NAA+PROBE: SIGNIFICANT CHANGE UP
HPIV2 RNA SPEC QL NAA+PROBE: SIGNIFICANT CHANGE UP
HPIV3 RNA SPEC QL NAA+PROBE: SIGNIFICANT CHANGE UP
HPIV4 RNA SPEC QL NAA+PROBE: SIGNIFICANT CHANGE UP
RAPID RVP RESULT: DETECTED
RV+EV RNA SPEC QL NAA+PROBE: SIGNIFICANT CHANGE UP
SARS-COV-2 RNA SPEC QL NAA+PROBE: DETECTED

## 2022-05-26 PROCEDURE — 99285 EMERGENCY DEPT VISIT HI MDM: CPT

## 2022-05-26 PROCEDURE — 0225U NFCT DS DNA&RNA 21 SARSCOV2: CPT

## 2022-05-26 NOTE — ED POST DISCHARGE NOTE - ADDITIONAL DOCUMENTATION
attempted to call patient-- unclear if number on file is patients real number therefore no message was left at this time

## 2022-05-26 NOTE — ED ADULT NURSE NOTE - OBJECTIVE STATEMENT
c/o cough , congestion, nausea without vomiting, and body aches x 3 days. Pt stated she has family in the home with similar S/S. Pt denies CP, SOB, V/D, fevers, chills. Pt Aox4, speaking coherently respirations even and unlabored on RA, skin warm and dry.

## 2022-06-20 ENCOUNTER — EMERGENCY (EMERGENCY)
Facility: HOSPITAL | Age: 37
LOS: 1 days | Discharge: HOME | End: 2022-06-20
Attending: EMERGENCY MEDICINE
Payer: MEDICAID

## 2022-06-20 VITALS
RESPIRATION RATE: 20 BRPM | HEART RATE: 100 BPM | DIASTOLIC BLOOD PRESSURE: 103 MMHG | WEIGHT: 152.78 LBS | SYSTOLIC BLOOD PRESSURE: 165 MMHG | HEIGHT: 67 IN | TEMPERATURE: 100 F | OXYGEN SATURATION: 97 %

## 2022-06-20 VITALS — HEART RATE: 84 BPM

## 2022-06-20 PROCEDURE — 99283 EMERGENCY DEPT VISIT LOW MDM: CPT

## 2022-06-20 RX ORDER — IBUPROFEN 200 MG
600 TABLET ORAL ONCE
Refills: 0 | Status: COMPLETED | OUTPATIENT
Start: 2022-06-20 | End: 2022-06-20

## 2022-06-20 RX ORDER — CEPHALEXIN 500 MG
500 CAPSULE ORAL ONCE
Refills: 0 | Status: COMPLETED | OUTPATIENT
Start: 2022-06-20 | End: 2022-06-20

## 2022-06-20 RX ORDER — ACETAMINOPHEN 500 MG
650 TABLET ORAL ONCE
Refills: 0 | Status: COMPLETED | OUTPATIENT
Start: 2022-06-20 | End: 2022-06-20

## 2022-06-20 RX ORDER — CEPHALEXIN 500 MG
1 CAPSULE ORAL
Qty: 14 | Refills: 0
Start: 2022-06-20 | End: 2022-06-26

## 2022-06-20 RX ADMIN — Medication 600 MILLIGRAM(S): at 14:02

## 2022-06-20 RX ADMIN — Medication 650 MILLIGRAM(S): at 14:03

## 2022-06-20 RX ADMIN — Medication 500 MILLIGRAM(S): at 14:03

## 2022-06-20 NOTE — ED ADULT TRIAGE NOTE - CHIEF COMPLAINT QUOTE
pt a+ox3, ambulatory into ED c/o left breast pain. pt currently nursing 6m old @ home, reports increase in left breast pain and warmth to skin last night.

## 2022-06-20 NOTE — ED PROVIDER NOTE - CLINICAL SUMMARY MEDICAL DECISION MAKING FREE TEXT BOX
Patient with mastitis- not septic at this time, well appearing, nontoxic. Will tx with tylenol, motrin, keflex and ector Hastings DO

## 2022-06-20 NOTE — ED PROVIDER NOTE - NSFOLLOWUPINSTRUCTIONS_ED_ALL_ED_FT
1. Follow up with your primary care physician within 2-3days for reevaluation.  2.  Return to the Emergency Department for worsening, progressive or any other concerning symptoms.   3.  Complete your entire course of antibiotics as prescribed. Do not stop taking antibiotics even if your symptoms improve.   4. Please take Motrin 600mg by mouth every 6 hours as needed for pain. Please take this medication with food.   5. Please take tylenol 650 mg every 4 hours as needed for pain. Please do not exceed more than 4,000mg of Tylenol in a day       Mastitis       Mastitis is inflammation of the breast tissue. It occurs most often in women who are breastfeeding, but it can also affect other women, and sometimes even men.      What are the causes?    This condition is usually caused by a bacterial infection. Bacteria can enter the breast tissue through cuts or openings in the skin. This usually occurs with breastfeeding because of cracked or irritated nipples. Sometimes, mastitis can occur when there are no cuts or openings in the skin. This is usually caused by plugged milk ducts.    Other causes include:  •Nipple piercing.      •Some forms of breast cancer.        What are the signs or symptoms?    Symptoms of this condition include:  •Swelling, redness, tenderness, and pain in an area of the breast. The area may also feel warm to the touch. These symptoms usually affect the upper part of the breast, toward the armpit region.      •Swelling of the glands under the arm on the same side.      •Discharge from the nipple.      •Fever.      •Chills.      •Nausea and vomiting.      •Rapid pulse.      •Fatigue, headache, and flu-like muscle aches.      If an infection is not treated, a collection of pus, or an abscess, may develop on the breast.      How is this diagnosed?    This condition can usually be diagnosed based on a physical exam and your symptoms. You may also have other tests, such as:  •Blood tests to check if your body is fighting an infection from bacteria.      •Mammogram or ultrasound tests to rule out other problems or diseases.      •Testing of pus and other fluids. Pus from the breast may be collected and tested in the lab. If an abscess has developed, the fluid in the abscess can be removed with a needle. This test can be used to confirm the diagnosis and identify the type of bacteria that is causing your mastitis.      •Culturing and testing breast milk for bacteria. This is done only if you are breastfeeding.        How is this treated?    Treatment for this condition may include:  •Applying heat or cold compresses to the affected area.      •Medicine for pain.      •Antibiotic medicine to treat an infection from bacteria. This is usually taken by mouth.      •Self-care, including rest and drinking more fluid.      •Removing fluid with a needle, if an abscess has developed.      Mastitis that occurs with breastfeeding will sometimes go away on its own, so your health care provider may choose to wait 24 hours after first seeing you to decide whether a prescription medicine is needed. You may be told of different ways to help manage breastfeeding, such as continuing to breastfeed or pump in order to ensure adequate milk flow.      Follow these instructions at home:      If you are breastfeeding:    •Continue to empty your breasts as often as possible. You can empty your breasts by breastfeeding or by using a breast pump. This will decrease the pressure and the pain that comes with full breasts.   •Ask your health care provider if you should make changes to your breastfeeding or pumping routine.        •During breastfeeding, empty the first breast completely before going to the other breast. If your baby is not emptying your breasts completely, use a breast pump to empty your breasts.      •Keep your nipples clean and dry.      •Use breast massage during feeding or pumping sessions.      •If directed, apply moist heat to the affected area of your breast right before breastfeeding or pumping. Use the heat source that your health care provider recommends.    •If directed, put ice on the affected area of your breast right after breastfeeding or pumping. To do this:  •Put ice in a plastic bag.      •Place a towel between your skin and the bag.      •Leave the ice on for 20 minutes.      •Remove the ice if your skin turns bright red. This is very important. If you cannot feel pain, heat, or cold, you have a greater risk of damage to the area.        •If you go back to work, pump your breasts while at work to stay within your nursing schedule.      •Avoid allowing your breasts to become very full with milk (engorged).      Medicines     •Take over-the-counter and prescription medicines only as told by your health care provider.      •If you were prescribed an antibiotic medicine, take it as told by your health care provider. Do not stop taking the antibiotic even if you start to feel better.      General instructions     • Do not wear a tight or underwire bra. Wear a soft, supportive bra.      •Drink enough fluid to keep your urine pale yellow. This is especially important if you have a fever.      •Get plenty of rest.      •Keep all follow-up visits. This is important.        Contact a health care provider if:    •You have pus-like discharge from the breast.      •You have a fever.      •Your symptoms do not improve within 2 days of starting treatment.        Get help right away if:    •Your pain and swelling are getting worse.      •You have pain that is not controlled with medicine.      •You have a red line extending from the breast toward your armpit.        Summary    •Mastitis is inflammation of the breast tissue. It occurs most often in women who are breastfeeding, but it can also affect non-breastfeeding women and some men.      •This condition is usually caused by a bacterial infection.      •This condition may be treated with hot and cold compresses, medicines, self-care, and certain breastfeeding strategies.      •If you were prescribed an antibiotic medicine, take it as told by your health care provider. Do not stop taking the antibiotic even if you start to feel better.      This information is not intended to replace advice given to you by your health care provider. Make sure you discuss any questions you have with your health care provider.

## 2022-06-20 NOTE — ED PROVIDER NOTE - PHYSICAL EXAMINATION
Gen: NAD, AOx3  Head: NCAT  HEENT:  oral mucosa moist, normal conjunctiva, oropharynx clear without exudate or erythema  Lung: CTAB, no respiratory distress, no wheezing, rales, rhonchi  CV: normal s1/s2, rrr, no murmurs, Normal perfusion, pulses 2+ throughout  Abd: soft, NTND  MSK: No edema, no visible deformities, full range of motion in all 4 extremities  Neuro: CN II-XII grossly intact, No focal neurologic deficits  Breast (Chaperoned by LEWIS Curiel)- L breast slightly erythematous at 5 oclock position with tenderness and palpable milk duct, no discharge, no fluctuance, no crepitus  Psych: normal affect

## 2022-06-20 NOTE — ED PROVIDER NOTE - OBJECTIVE STATEMENT
37yo female with PMH HTN, anemia presenting with L breast pain and chills x 1 day. patient is nursing 6 month old infant at home. No fevers. States she has been trying to self-express in shower and has continued nursing on that side.

## 2022-06-20 NOTE — ED PROVIDER NOTE - PATIENT PORTAL LINK FT
You can access the FollowMyHealth Patient Portal offered by [facility] by registering at the following website: [email or web address]. By joining [employer]’s FollowMyHealth portal, you will also be able to view your health information using other applications (apps) compatible with our system.

## 2022-08-09 ENCOUNTER — EMERGENCY (EMERGENCY)
Facility: HOSPITAL | Age: 37
LOS: 1 days | Discharge: HOME | End: 2022-08-09
Attending: EMERGENCY MEDICINE
Payer: MEDICAID

## 2022-08-09 VITALS
HEIGHT: 67 IN | SYSTOLIC BLOOD PRESSURE: 200 MMHG | RESPIRATION RATE: 16 BRPM | OXYGEN SATURATION: 97 % | TEMPERATURE: 98 F | HEART RATE: 88 BPM | WEIGHT: 143.96 LBS | DIASTOLIC BLOOD PRESSURE: 123 MMHG

## 2022-08-09 PROCEDURE — 93010 ELECTROCARDIOGRAM REPORT: CPT

## 2022-08-09 PROCEDURE — 99285 EMERGENCY DEPT VISIT HI MDM: CPT

## 2022-08-09 NOTE — ED ADULT TRIAGE NOTE - CHIEF COMPLAINT QUOTE
Ambulatory reporting CP that started 1 hour PTA with associated dyspnea. PMH HTN and was prescribed Nifedipine but stopped taking it "a long time ago." Denies N/V/D, sick contacts. EKG in progress.

## 2022-08-10 VITALS
TEMPERATURE: 98 F | SYSTOLIC BLOOD PRESSURE: 157 MMHG | DIASTOLIC BLOOD PRESSURE: 102 MMHG | RESPIRATION RATE: 18 BRPM | HEART RATE: 68 BPM | OXYGEN SATURATION: 98 %

## 2022-08-10 LAB
ALBUMIN SERPL ELPH-MCNC: 4.7 G/DL — SIGNIFICANT CHANGE UP (ref 3.3–5.2)
ALP SERPL-CCNC: 77 U/L — SIGNIFICANT CHANGE UP (ref 40–120)
ALT FLD-CCNC: 9 U/L — SIGNIFICANT CHANGE UP
ANION GAP SERPL CALC-SCNC: 12 MMOL/L — SIGNIFICANT CHANGE UP (ref 5–17)
AST SERPL-CCNC: 13 U/L — SIGNIFICANT CHANGE UP
BASOPHILS # BLD AUTO: 0.04 K/UL — SIGNIFICANT CHANGE UP (ref 0–0.2)
BASOPHILS NFR BLD AUTO: 0.5 % — SIGNIFICANT CHANGE UP (ref 0–2)
BILIRUB SERPL-MCNC: 0.5 MG/DL — SIGNIFICANT CHANGE UP (ref 0.4–2)
BUN SERPL-MCNC: 10.4 MG/DL — SIGNIFICANT CHANGE UP (ref 8–20)
CALCIUM SERPL-MCNC: 9.3 MG/DL — SIGNIFICANT CHANGE UP (ref 8.4–10.5)
CHLORIDE SERPL-SCNC: 103 MMOL/L — SIGNIFICANT CHANGE UP (ref 98–107)
CO2 SERPL-SCNC: 24 MMOL/L — SIGNIFICANT CHANGE UP (ref 22–29)
CREAT SERPL-MCNC: 0.75 MG/DL — SIGNIFICANT CHANGE UP (ref 0.5–1.3)
EGFR: 106 ML/MIN/1.73M2 — SIGNIFICANT CHANGE UP
EOSINOPHIL # BLD AUTO: 0.05 K/UL — SIGNIFICANT CHANGE UP (ref 0–0.5)
EOSINOPHIL NFR BLD AUTO: 0.7 % — SIGNIFICANT CHANGE UP (ref 0–6)
GLUCOSE SERPL-MCNC: 110 MG/DL — HIGH (ref 70–99)
HCG SERPL-ACNC: <4 MIU/ML — SIGNIFICANT CHANGE UP
HCT VFR BLD CALC: 36.7 % — SIGNIFICANT CHANGE UP (ref 34.5–45)
HGB BLD-MCNC: 12.5 G/DL — SIGNIFICANT CHANGE UP (ref 11.5–15.5)
IMM GRANULOCYTES NFR BLD AUTO: 0.3 % — SIGNIFICANT CHANGE UP (ref 0–1.5)
LIDOCAIN IGE QN: 41 U/L — SIGNIFICANT CHANGE UP (ref 22–51)
LYMPHOCYTES # BLD AUTO: 3.11 K/UL — SIGNIFICANT CHANGE UP (ref 1–3.3)
LYMPHOCYTES # BLD AUTO: 42 % — SIGNIFICANT CHANGE UP (ref 13–44)
MCHC RBC-ENTMCNC: 29.9 PG — SIGNIFICANT CHANGE UP (ref 27–34)
MCHC RBC-ENTMCNC: 34.1 GM/DL — SIGNIFICANT CHANGE UP (ref 32–36)
MCV RBC AUTO: 87.8 FL — SIGNIFICANT CHANGE UP (ref 80–100)
MONOCYTES # BLD AUTO: 0.38 K/UL — SIGNIFICANT CHANGE UP (ref 0–0.9)
MONOCYTES NFR BLD AUTO: 5.1 % — SIGNIFICANT CHANGE UP (ref 2–14)
NEUTROPHILS # BLD AUTO: 3.8 K/UL — SIGNIFICANT CHANGE UP (ref 1.8–7.4)
NEUTROPHILS NFR BLD AUTO: 51.4 % — SIGNIFICANT CHANGE UP (ref 43–77)
PLATELET # BLD AUTO: 332 K/UL — SIGNIFICANT CHANGE UP (ref 150–400)
POTASSIUM SERPL-MCNC: 3.6 MMOL/L — SIGNIFICANT CHANGE UP (ref 3.5–5.3)
POTASSIUM SERPL-SCNC: 3.6 MMOL/L — SIGNIFICANT CHANGE UP (ref 3.5–5.3)
PROT SERPL-MCNC: 7.4 G/DL — SIGNIFICANT CHANGE UP (ref 6.6–8.7)
RBC # BLD: 4.18 M/UL — SIGNIFICANT CHANGE UP (ref 3.8–5.2)
RBC # FLD: 13.8 % — SIGNIFICANT CHANGE UP (ref 10.3–14.5)
SODIUM SERPL-SCNC: 139 MMOL/L — SIGNIFICANT CHANGE UP (ref 135–145)
TROPONIN T SERPL-MCNC: <0.01 NG/ML — SIGNIFICANT CHANGE UP (ref 0–0.06)
TROPONIN T SERPL-MCNC: <0.01 NG/ML — SIGNIFICANT CHANGE UP (ref 0–0.06)
WBC # BLD: 7.4 K/UL — SIGNIFICANT CHANGE UP (ref 3.8–10.5)
WBC # FLD AUTO: 7.4 K/UL — SIGNIFICANT CHANGE UP (ref 3.8–10.5)

## 2022-08-10 PROCEDURE — 93005 ELECTROCARDIOGRAM TRACING: CPT

## 2022-08-10 PROCEDURE — 71046 X-RAY EXAM CHEST 2 VIEWS: CPT

## 2022-08-10 PROCEDURE — 96374 THER/PROPH/DIAG INJ IV PUSH: CPT

## 2022-08-10 PROCEDURE — 99285 EMERGENCY DEPT VISIT HI MDM: CPT | Mod: 25

## 2022-08-10 PROCEDURE — 84702 CHORIONIC GONADOTROPIN TEST: CPT

## 2022-08-10 PROCEDURE — 83690 ASSAY OF LIPASE: CPT

## 2022-08-10 PROCEDURE — 84484 ASSAY OF TROPONIN QUANT: CPT

## 2022-08-10 PROCEDURE — 71046 X-RAY EXAM CHEST 2 VIEWS: CPT | Mod: 26

## 2022-08-10 PROCEDURE — 80053 COMPREHEN METABOLIC PANEL: CPT

## 2022-08-10 PROCEDURE — 36415 COLL VENOUS BLD VENIPUNCTURE: CPT

## 2022-08-10 PROCEDURE — 85025 COMPLETE CBC W/AUTO DIFF WBC: CPT

## 2022-08-10 RX ORDER — ONDANSETRON 8 MG/1
4 TABLET, FILM COATED ORAL ONCE
Refills: 0 | Status: COMPLETED | OUTPATIENT
Start: 2022-08-10 | End: 2022-08-10

## 2022-08-10 RX ORDER — AMLODIPINE BESYLATE 2.5 MG/1
10 TABLET ORAL ONCE
Refills: 0 | Status: COMPLETED | OUTPATIENT
Start: 2022-08-10 | End: 2022-08-10

## 2022-08-10 RX ORDER — ASPIRIN/CALCIUM CARB/MAGNESIUM 324 MG
162 TABLET ORAL ONCE
Refills: 0 | Status: COMPLETED | OUTPATIENT
Start: 2022-08-10 | End: 2022-08-10

## 2022-08-10 RX ORDER — AMLODIPINE BESYLATE 2.5 MG/1
1 TABLET ORAL
Qty: 30 | Refills: 0
Start: 2022-08-10 | End: 2022-09-08

## 2022-08-10 RX ADMIN — Medication 30 MILLILITER(S): at 00:32

## 2022-08-10 RX ADMIN — ONDANSETRON 4 MILLIGRAM(S): 8 TABLET, FILM COATED ORAL at 00:31

## 2022-08-10 RX ADMIN — Medication 162 MILLIGRAM(S): at 00:31

## 2022-08-10 RX ADMIN — AMLODIPINE BESYLATE 10 MILLIGRAM(S): 2.5 TABLET ORAL at 00:31

## 2022-08-10 NOTE — ED PROVIDER NOTE - CLINICAL SUMMARY MEDICAL DECISION MAKING FREE TEXT BOX
35 y/o F with PMHx HTN who presents to the ED for chest pain. Patient states that the pain started one hour PTA, is mid-sternal, radiates to her R side, is sharp, and intermittent. Basic labs, trop, ekg, cxr, lipase, maalox, zofran, reassess.

## 2022-08-10 NOTE — ED ADULT NURSE NOTE - OBJECTIVE STATEMENT
Pt alert and oriented x4, Pt alert and oriented x4, c/o of chest pain that started an hour before coming to the hospital, radiates from the left chest wall to left arm, +nausea. SR/SB on the cardiac monitor. On RA.  As per pt happening right before giving birth to her daughter who is now 7 months, + breastfeeding. Was prescribed procardia 20 mg but stopped taking it. Blood pressure elevated. Blood work sent and meds given. Will reassess

## 2022-08-10 NOTE — ED PROVIDER NOTE - PHYSICAL EXAMINATION
General:NAD  Head: NC, AT  EENT: no scleral icterus  Cardiac: RRR, no apparent murmurs, no lower extremity edema  Respiratory: CTABL, no respiratory distress   Abdomen: soft, ND, nonperitonitic  MSK/Vascular: soft compartments, warm extremities  Neuro: AAOx3, sensation to light touch intact  Psych: calm, cooperative

## 2022-08-10 NOTE — ED PROVIDER NOTE - NSFOLLOWUPINSTRUCTIONS_ED_ALL_ED_FT
- Follow up with your doctor within 2-3 days.   - Follow up with Cardiology, see information on next page.   - Bring results with you to the appointment.   - Return to the ED for any new or worsening symptoms.     Chest Pain    Chest pain can be caused by many different conditions which may or may not be dangerous. Causes include heartburn, lung infections, heart attack, blood clot in lungs, skin infections, strain or damage to muscle, cartilage, or bones, etc. In addition to a history and physical examination, an electrocardiogram (ECG) or other lab tests may have been performed to determine the cause of your chest pain. Follow up with your primary care provider or with a cardiologist as instructed.     SEEK IMMEDIATE MEDICAL CARE IF YOU HAVE ANY OF THE FOLLOWING SYMPTOMS: worsening chest pain, coughing up blood, unexplained back/neck/jaw pain, severe abdominal pain, dizziness or lightheadedness, fainting, shortness of breath, sweaty or clammy skin, vomiting, or racing heart beat. These symptoms may represent a serious problem that is an emergency. Do not wait to see if the symptoms will go away. Get medical help right away. Call 911 and do not drive yourself to the hospital.

## 2022-08-10 NOTE — ED PROVIDER NOTE - OBJECTIVE STATEMENT
37 y/o F with PMHx HTN who presents to the ED for chest pain. Patient states that the pain started one hour PTA, is mid-sternal, radiates to her R side, is sharp, and intermittent. States that this has never happened to her before. States that her Cardiologist is Dr. Posada whom she last saw two months ago and was told that she has a "leaky valve". States that she has a history of HTN and is supposed to be taking Nifedipine, but stopped taking it. Endorses some nausea, but denies any syncope, shortness of breath, or any other complaints at this time.

## 2022-08-10 NOTE — ED PROVIDER NOTE - PATIENT PORTAL LINK FT
You can access the FollowMyHealth Patient Portal offered by [facility] by registering at the following website: [email or web address]. By joining [email or web address]’s FollowMyHealth portal, you will also be able to view your health information using other applications (apps) compatible with our system.

## 2022-08-10 NOTE — ED ADULT NURSE NOTE - PAIN: PRECIPITATING/AGGRAVATING FACTORS
impairments. Continue treatment per established plan of care. Discharge Recommendations:  Home Health with 24/7 assistance  Further Equipment Recommendations for Discharge:  quad cane       SUBJECTIVE:   Patient stated I hope I can go home.       OBJECTIVE DATA SUMMARY:   Critical Behavior:  Neurologic State: Alert, Eyes open spontaneously  Orientation Level: Oriented X4  Cognition: Appropriate decision making, Appropriate safety awareness, Follows commands     Functional Mobility Training:  Bed Mobility:    Pt received in and returned to chair      Transfers:  Sit to Stand: Contact guard assistance; Additional time (verbal cues)  Stand to Sit: Contact guard assistance; Additional time     Balance:  Sitting: Intact  Standing: Impaired; Without support  Standing - Static: Fair  Standing - Dynamic : Fair  Ambulation/Gait Training:  Distance (ft): 150 Feet (ft)  Assistive Device: Gait belt;Cane, quad  Ambulation - Level of Assistance: Contact guard assistance; Additional time; Adaptive equipment  Gait Abnormalities: Step to gait; Decreased step clearance; Altered arm swing  Base of Support: Narrowed  Speed/Jimena: Slow  Step Length: Left shortened;Right shortened                 Pain:  Pain Scale 1: Numeric (0 - 10)  Pain Intensity 1: 0  Pain Location 1: Neck  Pain Orientation 1: Right  Pain Description 1: Aching  Pain Intervention(s) 1: Medication (see MAR)  Activity Tolerance:   Good- pt ambulated 150 feet with cane and no report of pain or SOB  Please refer to the flowsheet for vital signs taken during this treatment.   After treatment:   [X]    Patient left in no apparent distress sitting up in chair  [ ]    Patient left in no apparent distress in bed  [X]    Call bell left within reach  [X]    Nursing notified  [ ]    Caregiver present  [ ]    Bed alarm activated      COMMUNICATION/COLLABORATION:   The patients plan of care was discussed with: Registered Nurse     Ama Breen, PT   Time Calculation: 9 mins activity/inactivity

## 2022-08-10 NOTE — ED PROVIDER NOTE - ATTENDING CONTRIBUTION TO CARE
Agata: I performed a face to face bedside interview with patient regarding history of present illness, review of symptoms and past medical history. I completed an independent physical exam and ordered tests/medications as needed.  I have discussed patient's plan of care with the resident. The resident assisted in  executing the discussed plan. I was available for any questions or issues that may have arose during the execution of the plan of care. 36F h/o HTN, preeclampsia, leaky valve p/w 1 hour of non exertional mid sternal CP radiating under L breast. Hypertensive in ED, has not taken BP meds in 3 months. +nausea.   Gen: Well appearing in NAD  Head: NC/AT  Neck: trachea midline  Resp:  No distress, CTAB  CV: RRR  GI: +epigastric ttp  Ext: no deformities  Neuro:  A&O appears non focal  Skin:  Warm and dry as visualized  Psych:  Normal affect and mood   Plan for labs, ECG (WNL), amlodipine, BP meds, cxr, trop x 2. Reassess.

## 2022-08-10 NOTE — ED PROVIDER NOTE - NSFOLLOWUPCLINICS_GEN_ALL_ED_FT
[facility]  Cardiology  [address], [address]  [place] [address]  Phone: [phone]  Fax:

## 2022-08-10 NOTE — ED PROVIDER NOTE - NSICDXPASTSURGICALHX_GEN_ALL_CORE_FT
Dr. Oden at bedside, notified pt ate donuts @ 0300, drank red colored water @ 0400, and was chewing gum on arrival to pre-op.  Notified has not taken his Atenolol > 24 hours.  Orders noted.  
PAST SURGICAL HISTORY:  No significant past surgical history

## 2022-08-10 NOTE — ED PROVIDER NOTE - NS ED ROS FT
Constitutional: no fever, no chills  Head: NC, AT   Eyes: no redness   ENMT: no nasal congestion/drainage, no sore throat   CV: + chest pain, no edema  Resp: no cough, no dyspnea  GI: no abdominal pain, + nausea, no vomiting, no diarrhea  : no dysuria, no hematuria  Skin: no lesions, no rashes  Neuro: no LOC, no headache, no sensory deficits, no weakness

## 2022-10-10 ENCOUNTER — EMERGENCY (EMERGENCY)
Facility: HOSPITAL | Age: 37
LOS: 1 days | Discharge: HOME | End: 2022-10-10
Attending: EMERGENCY MEDICINE
Payer: MEDICAID

## 2022-10-10 VITALS
WEIGHT: 145.06 LBS | HEART RATE: 99 BPM | DIASTOLIC BLOOD PRESSURE: 87 MMHG | TEMPERATURE: 98 F | RESPIRATION RATE: 18 BRPM | OXYGEN SATURATION: 99 % | SYSTOLIC BLOOD PRESSURE: 142 MMHG | HEIGHT: 67 IN

## 2022-10-10 LAB
ALBUMIN SERPL ELPH-MCNC: 4.1 G/DL — SIGNIFICANT CHANGE UP (ref 3.3–5.2)
ALP SERPL-CCNC: 77 U/L — SIGNIFICANT CHANGE UP (ref 40–120)
ALT FLD-CCNC: 17 U/L — SIGNIFICANT CHANGE UP
ANION GAP SERPL CALC-SCNC: 16 MMOL/L — SIGNIFICANT CHANGE UP (ref 5–17)
AST SERPL-CCNC: 40 U/L — HIGH
BASOPHILS # BLD AUTO: 0.05 K/UL — SIGNIFICANT CHANGE UP (ref 0–0.2)
BASOPHILS NFR BLD AUTO: 0.7 % — SIGNIFICANT CHANGE UP (ref 0–2)
BILIRUB SERPL-MCNC: 0.8 MG/DL — SIGNIFICANT CHANGE UP (ref 0.4–2)
BUN SERPL-MCNC: 10.1 MG/DL — SIGNIFICANT CHANGE UP (ref 8–20)
CALCIUM SERPL-MCNC: 9.2 MG/DL — SIGNIFICANT CHANGE UP (ref 8.4–10.5)
CHLORIDE SERPL-SCNC: 102 MMOL/L — SIGNIFICANT CHANGE UP (ref 98–107)
CO2 SERPL-SCNC: 16 MMOL/L — LOW (ref 22–29)
CREAT SERPL-MCNC: 0.55 MG/DL — SIGNIFICANT CHANGE UP (ref 0.5–1.3)
EGFR: 122 ML/MIN/1.73M2 — SIGNIFICANT CHANGE UP
EOSINOPHIL # BLD AUTO: 0.04 K/UL — SIGNIFICANT CHANGE UP (ref 0–0.5)
EOSINOPHIL NFR BLD AUTO: 0.5 % — SIGNIFICANT CHANGE UP (ref 0–6)
GLUCOSE SERPL-MCNC: 88 MG/DL — SIGNIFICANT CHANGE UP (ref 70–99)
HCG SERPL-ACNC: <4 MIU/ML — SIGNIFICANT CHANGE UP
HCT VFR BLD CALC: 38.4 % — SIGNIFICANT CHANGE UP (ref 34.5–45)
HGB BLD-MCNC: 13.1 G/DL — SIGNIFICANT CHANGE UP (ref 11.5–15.5)
IMM GRANULOCYTES NFR BLD AUTO: 0.4 % — SIGNIFICANT CHANGE UP (ref 0–0.9)
LIDOCAIN IGE QN: 26 U/L — SIGNIFICANT CHANGE UP (ref 22–51)
LYMPHOCYTES # BLD AUTO: 1.94 K/UL — SIGNIFICANT CHANGE UP (ref 1–3.3)
LYMPHOCYTES # BLD AUTO: 25.5 % — SIGNIFICANT CHANGE UP (ref 13–44)
MCHC RBC-ENTMCNC: 30 PG — SIGNIFICANT CHANGE UP (ref 27–34)
MCHC RBC-ENTMCNC: 34.1 GM/DL — SIGNIFICANT CHANGE UP (ref 32–36)
MCV RBC AUTO: 87.9 FL — SIGNIFICANT CHANGE UP (ref 80–100)
MONOCYTES # BLD AUTO: 0.32 K/UL — SIGNIFICANT CHANGE UP (ref 0–0.9)
MONOCYTES NFR BLD AUTO: 4.2 % — SIGNIFICANT CHANGE UP (ref 2–14)
NEUTROPHILS # BLD AUTO: 5.23 K/UL — SIGNIFICANT CHANGE UP (ref 1.8–7.4)
NEUTROPHILS NFR BLD AUTO: 68.7 % — SIGNIFICANT CHANGE UP (ref 43–77)
NT-PROBNP SERPL-SCNC: 82 PG/ML — SIGNIFICANT CHANGE UP (ref 0–300)
PLATELET # BLD AUTO: 411 K/UL — HIGH (ref 150–400)
POTASSIUM SERPL-MCNC: 5.6 MMOL/L — HIGH (ref 3.5–5.3)
POTASSIUM SERPL-SCNC: 5.6 MMOL/L — HIGH (ref 3.5–5.3)
PROT SERPL-MCNC: 8.1 G/DL — SIGNIFICANT CHANGE UP (ref 6.6–8.7)
RBC # BLD: 4.37 M/UL — SIGNIFICANT CHANGE UP (ref 3.8–5.2)
RBC # FLD: 13.1 % — SIGNIFICANT CHANGE UP (ref 10.3–14.5)
SODIUM SERPL-SCNC: 134 MMOL/L — LOW (ref 135–145)
TROPONIN T SERPL-MCNC: <0.01 NG/ML — SIGNIFICANT CHANGE UP (ref 0–0.06)
WBC # BLD: 7.61 K/UL — SIGNIFICANT CHANGE UP (ref 3.8–10.5)
WBC # FLD AUTO: 7.61 K/UL — SIGNIFICANT CHANGE UP (ref 3.8–10.5)

## 2022-10-10 PROCEDURE — 71045 X-RAY EXAM CHEST 1 VIEW: CPT | Mod: 26

## 2022-10-10 PROCEDURE — 83690 ASSAY OF LIPASE: CPT

## 2022-10-10 PROCEDURE — 93010 ELECTROCARDIOGRAM REPORT: CPT

## 2022-10-10 PROCEDURE — 36415 COLL VENOUS BLD VENIPUNCTURE: CPT

## 2022-10-10 PROCEDURE — 96372 THER/PROPH/DIAG INJ SC/IM: CPT

## 2022-10-10 PROCEDURE — 99285 EMERGENCY DEPT VISIT HI MDM: CPT

## 2022-10-10 PROCEDURE — 71045 X-RAY EXAM CHEST 1 VIEW: CPT

## 2022-10-10 PROCEDURE — 83880 ASSAY OF NATRIURETIC PEPTIDE: CPT

## 2022-10-10 PROCEDURE — 93005 ELECTROCARDIOGRAM TRACING: CPT

## 2022-10-10 PROCEDURE — 84702 CHORIONIC GONADOTROPIN TEST: CPT

## 2022-10-10 PROCEDURE — 85025 COMPLETE CBC W/AUTO DIFF WBC: CPT

## 2022-10-10 PROCEDURE — 80053 COMPREHEN METABOLIC PANEL: CPT

## 2022-10-10 PROCEDURE — 84484 ASSAY OF TROPONIN QUANT: CPT

## 2022-10-10 RX ORDER — KETOROLAC TROMETHAMINE 30 MG/ML
15 SYRINGE (ML) INJECTION ONCE
Refills: 0 | Status: DISCONTINUED | OUTPATIENT
Start: 2022-10-10 | End: 2022-10-10

## 2022-10-10 RX ADMIN — Medication 15 MILLIGRAM(S): at 16:33

## 2022-10-10 NOTE — ED PROVIDER NOTE - OBJECTIVE STATEMENT
Patient is a 37 yo female with PMHx HTN, "leaky" mitral valve presenting with CP that started 30 minutes ago. Patient states she developed dull intermittent substernal CP 30 minutes PTA which has now resolved but can be reproduced with palpation. Patient states she has a 9 month old baby and has had a lot of chronic neck pain from carrying her baby and now developed CP today. Denies n/v, fever, cough, congestion, syncope, lightheadedness. Patient has close follow up with her cardiologist Dr. Posada and recently had a normal stress test and echo 6 months ago and good follow up next week. Denies any smoking, alcohol use, recreational drug use, FH cardiac disease, CAD. Denies fevers, chills, dizziness, lightheadedness, dysphagia, dysarthria, diplopia, photophobia, syncope, cough, congestion, SOB, abdominal pain, neck pain, back pain, diarrhea, dysuria, hematuria, hematochezia, hematemesis, n/v, recent travel, sick contacts, leg swelling.

## 2022-10-10 NOTE — ED ADULT NURSE NOTE - OBJECTIVE STATEMENT
pt presents to ed c/o CP since 1pm.  patient states pain started mid sternum and then radiated to the left side of her chest.  associating symptoms of sob/ dizziness.  no other aggravating factors.  NAD at this time.

## 2022-10-10 NOTE — ED PROVIDER NOTE - PATIENT PORTAL LINK FT
You can access the FollowMyHealth Patient Portal offered by [facility] by registering at the following website: [email or web address]. By joining [employer]’s FollowMyHealth portal, you will also be able to view your health information using other applications (apps) compatible with our system.

## 2022-10-10 NOTE — ED PROVIDER NOTE - PROGRESS NOTE DETAILS
JK - patient vss, resting comfortably in no distress. EKG, CXR, troponin WNL. Patient reports improvement in her symptoms. Ready and agreeable to DC with return precautions. Patient has close cardiology follow up.

## 2022-10-10 NOTE — ED PROVIDER NOTE - PHYSICAL EXAMINATION
Constitutional: Anxious appearing, awake, alert, oriented to person, place, and time/situation and in no apparent distress  ENMT: Airway patent nasal mucosa clear. Mouth with normal mucosa. Throat has no vesicles no oropharyngeal exudates and uvula is midline. No blood in the oropharynx  EYES: clear bilaterally, pupils equal, round and reactive to light  Cardiac: Regular rate, regular rhythm. Heart sounds S1, S2. No murmurs, rubs or gallops. Good capillary refill, 2+ pulses, no peripheral edema. Chest wall mildly ttp, no stepoffs or obvious deformities, full ROM at the shoulder neck elbows wrists  Respiratory: Lungs CTAB, no use of accessory muscles, no crackles, satting 95% on RA in no distress  Gastrointestinal: Abdomen nondistended, non-tender, no rebound guarding or peritoneal signs  Genitourinary: No CVA tenderness, pelvis nontender, bladder nondistended  Musculoskeletal: Spine appears normal, range of motion is not limited, no muscle or joint tenderness  Neurological: Alert and oriented, no focal deficits, no motor or sensory deficits. CN 2-12 intact, PERRLA, EOMI, No FND, moving all extremities equally, sensation intact, No dysmetria, no ataxia, negative heel-shin, 5/5 strength   Skin: Skin normal color for race, warm, dry and intact, No evidence of rash

## 2022-10-10 NOTE — ED PROVIDER NOTE - CLINICAL SUMMARY MEDICAL DECISION MAKING FREE TEXT BOX
Patient is a 37 yo female with PMHx HTN, "leaky" mitral valve presenting with CP that started 30 minutes ago. Patient states she developed dull intermittent substernal CP 30 minutes PTA which has now resolved but can be reproduced with palpation. Patient has close follow up with her cardiologist Dr. Posada and recently had a normal stress test and echo 6 months ago and good follow up next week. Denies any smoking, alcohol use, recreational drug use, FH cardiac disease, CAD. VSS, well appearing, EKG WNL, lungs ctab, chest wall ttp anteriorly, no peripheral edema. Will treat likely costochondritis, check CXR, troponin, labs, reassess.

## 2022-12-05 NOTE — ED ADULT NURSE NOTE - CADM POA CENTRAL LINE
No Provider Procedure Text (D): After obtaining clear surgical margins the defect was repaired by another provider.

## 2022-12-05 NOTE — ED ADULT TRIAGE NOTE - BP NONINVASIVE SYSTOLIC (MM HG)
Please read and follow the handout(s) instructions. Return, if needed, for increased or worsening symptoms and as directed by the handout(s).    Increase your fluid intake. Drinking small amounts often is the best way to take in more fluids when you are feeling nauseated.     165

## 2023-02-01 NOTE — OB RN TRIAGE NOTE - NS_TRIAGETIMEOF NOTIFICATION_OBGYN_ALL_OB_DT
Called patient to remind her today is her scheduled day to send in her pacemaker reading with her home monitor. She thanked me for the reminder and said she would send it in.  
01-Feb-2022 12:50

## 2023-03-09 NOTE — ED STATDOCS - NSCAREINITIATED _GEN_ER
Refill request received through pharmacy.  The requested medication(s) have been refilled. The refill request(s) was within refill protocol.  Patient has a pending appointment.   
David Sim(Attending)

## 2023-03-20 ENCOUNTER — EMERGENCY (EMERGENCY)
Facility: HOSPITAL | Age: 38
LOS: 1 days | Discharge: HOME | End: 2023-03-20
Attending: STUDENT IN AN ORGANIZED HEALTH CARE EDUCATION/TRAINING PROGRAM
Payer: MEDICAID

## 2023-03-20 VITALS
HEIGHT: 67 IN | WEIGHT: 139.99 LBS | OXYGEN SATURATION: 97 % | SYSTOLIC BLOOD PRESSURE: 147 MMHG | TEMPERATURE: 99 F | DIASTOLIC BLOOD PRESSURE: 93 MMHG | RESPIRATION RATE: 18 BRPM | HEART RATE: 86 BPM

## 2023-03-20 PROCEDURE — 73140 X-RAY EXAM OF FINGER(S): CPT | Mod: 26,RT

## 2023-03-20 PROCEDURE — 99283 EMERGENCY DEPT VISIT LOW MDM: CPT

## 2023-03-20 PROCEDURE — 99284 EMERGENCY DEPT VISIT MOD MDM: CPT | Mod: 57

## 2023-03-20 PROCEDURE — 73140 X-RAY EXAM OF FINGER(S): CPT

## 2023-03-20 PROCEDURE — 26720 TREAT FINGER FRACTURE EACH: CPT | Mod: 54,F8

## 2023-03-20 RX ORDER — ACETAMINOPHEN 500 MG
975 TABLET ORAL ONCE
Refills: 0 | Status: COMPLETED | OUTPATIENT
Start: 2023-03-20 | End: 2023-03-20

## 2023-03-20 RX ORDER — IBUPROFEN 200 MG
600 TABLET ORAL ONCE
Refills: 0 | Status: COMPLETED | OUTPATIENT
Start: 2023-03-20 | End: 2023-03-20

## 2023-03-20 RX ADMIN — Medication 600 MILLIGRAM(S): at 22:09

## 2023-03-20 RX ADMIN — Medication 975 MILLIGRAM(S): at 22:09

## 2023-03-20 NOTE — ED PROVIDER NOTE - OBJECTIVE STATEMENT
36 yo female no PMHx presents to ED c/o right 4th digit injury. +Pain, swelling. May have hit it last night, but unsure. No further complaints at this time.

## 2023-03-20 NOTE — ED PROVIDER NOTE - PHYSICAL EXAMINATION
Gen: Nontoxic, well appearing, in NAD.  Skin: Warm and dry as visualized.  Head: NC/AT.  Eyes: PERRLA. EOMI.  Neck: Supple, FROM. Trachea midline.   Resp: No distress.  Cardio: Well perfused.  Ext: No deformities. +Swelling, bruising, tenderness to right 4th digit. No metacarpal tenderness. MAEx4. FROM.   Neuro: A&Ox3. Appears nonfocal.   Psych: Normal affect and mood.

## 2023-03-20 NOTE — ED ADULT NURSE NOTE - NSIMPLEMENTINTERV_GEN_ALL_ED
Implemented All Universal Safety Interventions:  [place] to call system. Call bell, personal items and telephone within reach. Instruct patient to call for assistance. Room bathroom lighting operational. Non-slip footwear when patient is off stretcher. Physically safe environment: no spills, clutter or unnecessary equipment. Stretcher in lowest position, wheels locked, appropriate side rails in place.

## 2023-03-20 NOTE — ED PROVIDER NOTE - ATTENDING APP SHARED VISIT CONTRIBUTION OF CARE
I agree with the PA's note and was available for any issues/concerns. I was directly involved in patient care. My brief overall assessment is as follows: right 4th finger injury. xr with fx/ splint and outpt hand f/u

## 2023-03-20 NOTE — ED PROVIDER NOTE - DATE/TIME 1
Anesthesia Pre Eval Note    Anesthesia ROS/Med Hx        Anesthetic Complication History:  Patient does not have a history of anesthetic complications      Pulmonary Review:  Patient does not have a pulmonary history      Neuro/Psych Review:    Positive for headaches  Positive for psychiatric history    Cardiovascular Review:  Patient does not have a cardiovascular history   Exercise tolerance: good (>4 METS)    GI/HEPATIC/RENAL Review:  Patient does not have a GI/hepatic/renalhistory       End/Other Review:  Patient does not have an endo/other history      Overall Review of Systems Comments:  Npo>8hr  Additional Results:     ALLERGIES:  No Known Allergies     Past Medical History:  LMP age 43: Menopausal state  No date: Menorrhagia      Comment:  prior to menopause/ ablation  No date: Migraine      Comment:  no aura  No date: PMDD (premenstrual dysphoric disorder)    Past Surgical History:  ,:  section, low transverse      Comment:  breech x2  10/2012: Endometrial ablation      Comment:  Dr. Lutz  : Hysteroscopy      Comment:  [facility]       Prior to Admission medications :  Medication sertraline (ZOLOFT) 50 MG tablet, Sig Take 1 tablet by mouth daily., Start Date 2/3/23, End Date , Taking? , Authorizing Provider Ariana Post CNP    Medication Sodium Sulfate-Mag Sulfate-KCl [phone] MG Tab, Sig Take 12 tablets by mouth as directed. See Colonoscopy Instructions., Start Date 22, End Date , Taking? , Authorizing Provider Orlando Carr MD         Patient Vitals in the past 24 hrs:  23 0929, BP:124/86, Temp:36.4 °C (97.6 °F), Temp src:Temporal, Pulse:(!) 101, Resp:16, SpO2:99 %, Height:5' 8\" (1.727 m), Weight:70.3 kg (155 lb)      Relevant Problems   No relevant active problems       Physical Exam     Airway   Mallampati: II  TM Distance: >3 FB  Neck ROM: Full  Neck: Non-tender and Able to place in sniff position  TMJ Mobility: Good    Cardiovascular  Cardiovascular exam  normal  Cardio Rhythm: Regular  Cardio Rate: Normal    Head Assessment  Head assessment: Normocephalic and Atraumatic    General Assessment  General Assessment: Alert and oriented and No acute distress    Dental Exam  Dental exam normal    Pulmonary Exam  Pulmonary exam normal  Breath sounds clear to auscultation:  Yes    Abdominal Exam  Abdominal exam normal      Anesthesia Plan:    ASA Status: 1  Anesthesia Type: MAC    Induction: Intravenous  Maintenance: TIVA  Premedication: None      Postoperative analgesia plan does NOT include opiods    Checklist  Reviewed: NPO Status, Allergies, Medications, Problem list, Past Med History and Patient Summary  Consent/Risks Discussed Statement:  The proposed anesthetic plan, including its risks and benefits, have been discussed with the Patient along with the risks and benefits of alternatives. Questions were encouraged and answered and the patient and/or representative understands and agrees to proceed.        I discussed with the patient (and/or patient's legal representative) the risks and benefits of the proposed anesthesia plan, MAC, which may include services performed by other anesthesia providers.    Alternative anesthesia plans, if available, were reviewed with the patient (and/or patient's legal representative). Discussion has been held with the patient (and/or patient's legal representative) regarding risks of anesthesia, which include Intra-operative Awareness and emergent situations that may require change in anesthesia plan.    The patient (and/or patient's legal representative) has indicated understanding, his/her questions have been answered, and he/she wishes to proceed with the planned anesthetic.    Blood Products: Not Anticipated     14-Sep-2020 19:03

## 2023-03-20 NOTE — ED ADULT NURSE NOTE - CHIEF COMPLAINT QUOTE
s/p injury to right hand. Report hit her hand on something now c/o of swelling and pain. Hx of HTn Mohs Method Verbiage: An incision at a 45 degree angle following the standard Mohs approach was done and the specimen was harvested as a microscopic controlled layer.

## 2023-03-20 NOTE — ED ADULT TRIAGE NOTE - CCCP TRG CHIEF CMPLNT
Was the patient seen in the last year in this department? Yes    Does patient have an active prescription for medications requested? No     Received Request Via: Pharmacy      Pt met protocol?: Yes    LAST OV 03/05/2020     
hand pain/injury

## 2023-03-20 NOTE — ED PROVIDER NOTE - CLINICAL SUMMARY MEDICAL DECISION MAKING FREE TEXT BOX
36 yo female no PMHx presents to ED c/o right 4th digit injury, +fracture. Splinted, hand follow up.

## 2023-03-20 NOTE — ED PROVIDER NOTE - PATIENT PORTAL LINK FT
You can access the FollowMyHealth Patient Portal offered by [facility] by registering at the following website: [email or web address]. By joining [employer]’s FollowMyHealth portal, you will also be able to view your health information using other applications (apps) compatible with our system.

## 2023-03-20 NOTE — ED ADULT TRIAGE NOTE - PAIN RATING/NUMBER SCALE (0-10): ACTIVITY
8 (severe pain) Normal vision: sees adequately in most situations; can see medication labels, newsprint

## 2023-03-20 NOTE — ED PROVIDER NOTE - CARE PROVIDER_API CALL
Ming Jones)  Orthopaedic Surgery; Surgery of the Hand  [address] [place] [address]  Phone: [phone]  Fax: [phone]  Follow Up Time:

## 2023-03-20 NOTE — ED PROVIDER NOTE - NSFOLLOWUPINSTRUCTIONS_ED_ALL_ED_FT
- Ibuprofen 600mg every 6 hours as needed for pain.  - Acetaminophen 650mg every 6 hours as needed for pain.  - Keep splint in place until cleared by hand specialist.   - Please call tomorrow to schedule follow up appointment with hand specialist.   - Please bring all documentation from your ED visit to any related future follow up appointment.  - Please call to schedule follow up appointment with your primary care physician within 24-48 hours.  - Please seek immediate medical attention or return to the ED for any new/worsening, signs/symptoms, or concerns.    Feel better!      Finger Fracture, Adult      A finger fracture is a break in any of the finger bones.      What are the causes?    The main cause of finger fractures is injury, such as from sports, a fall, or closing your finger in a drawer or door.      What increases the risk?    The following factors may make you more likely to develop this condition:  •Playing sports.      •Workplace activities that involve machinery.      •Having weak bones (osteoporosis). This condition makes your bones less dense and causes them to break easily.        What are the signs or symptoms?    The main symptoms of a fractured finger are pain, bruising, and swelling shortly after the injury. Other symptoms include:  •Stiffness.      •Exposed bones (compound fracture or open fracture), in severe cases.        How is this diagnosed?    This condition is diagnosed based on a physical exam, your medical history, and your symptoms. An X-ray will also be done to confirm the diagnosis.      How is this treated?    Treatment for this condition depends on the severity of the fracture. If the bones are still in place, the finger may be splinted to keep the finger still while it heals (immobilization). If several fingers are fractured, you may need a cast. A cast may be applied up to the elbow to keep your fingers and hand from moving.    If the bones are out of place, your health care provider may move them back into place manually or surgically.    You may also need to do physical therapy exercises to improve movement and strength in your finger.      Follow these instructions at home:      If you have a removable splint:   Hand showing finger splint on index and middle fingers and wrist.   •Wear the splint as told by your health care provider. Remove it only as told by your health care provider.      •Check the skin around the splint every day. Tell your health care provider about any concerns.      •Loosen the splint if your fingers tingle, become numb, or turn cold and blue.      •Keep the splint clean and dry.      If you have a nonremovable cast:     • Do not put pressure on any part of the cast until it is fully hardened. This may take several hours.      • Do not stick anything inside the cast to scratch your skin. Doing that increases your risk of infection.      •Check the skin around the cast every day. Tell your health care provider about any concerns.      •You may put lotion on dry skin around the edges of the cast. Do not put lotion on the skin underneath the cast.      •Keep the cast clean and dry.      Bathing     • Do not take baths, swim, or use a hot tub until your health care provider approves. Ask your health care provider if you may take showers.    •If your splint or cast is not waterproof:  •Do not let it get wet.      •Cover it with a watertight covering when you take a bath or shower.        Managing pain, stiffness, and swelling   •If directed, put ice on the injured area. To do this:  •If you have a removable splint, remove it as told by your health care provider.      •Put ice in a plastic bag.      •Place a towel between your skin and the bag, or between your cast and the bag.      •Leave the ice on for 20 minutes, 2–3 times a day.      •Remove the ice if your skin turns bright red. This is very important. If you cannot feel pain, heat, or cold, you have a greater risk of damage to the area.        •Move your fingers often to reduce stiffness and swelling.      •Raise (elevate) the injured area above the level of your heart while you are sitting or lying down.      Activity     •Ask your health care provider if the medicine prescribed to you requires you to avoid driving or using machinery.      •Do physical therapy exercises as told by your health care provider.      •Return to your normal activities as told by your health care provider. Ask your health care provider what activities are safe for you.      •Ask your health care provider when it is safe to drive if you have a splint or cast on your finger.      General instructions     • Do not use any products that contain nicotine or tobacco. These products include cigarettes, chewing tobacco, and vaping devices, such as e-cigarettes. These can delay bone healing. If you need help quitting, ask your health care provider.      •Take over-the-counter and prescription medicines only as told by your health care provider.      •Keep all follow-up visits. This is important.        Contact a health care provider if:    •Your pain or swelling gets worse, even with treatment.      •You have trouble moving your finger.        Get help right away if:    •Your finger becomes numb or blue.        Summary    •A finger fracture is a break in any of the finger bones.      •Injury is the main cause of finger fractures.      •Treatment for this condition depends on the severity of the fracture.      This information is not intended to replace advice given to you by your health care provider. Make sure you discuss any questions you have with your health care provider.

## 2023-05-25 ENCOUNTER — EMERGENCY (EMERGENCY)
Facility: HOSPITAL | Age: 38
LOS: 1 days | Discharge: HOME | End: 2023-05-25
Attending: EMERGENCY MEDICINE
Payer: MEDICAID

## 2023-05-25 VITALS
RESPIRATION RATE: 20 BRPM | DIASTOLIC BLOOD PRESSURE: 92 MMHG | HEART RATE: 94 BPM | TEMPERATURE: 98 F | SYSTOLIC BLOOD PRESSURE: 148 MMHG | WEIGHT: 145.06 LBS | OXYGEN SATURATION: 97 %

## 2023-05-25 VITALS
DIASTOLIC BLOOD PRESSURE: 96 MMHG | HEART RATE: 79 BPM | OXYGEN SATURATION: 98 % | SYSTOLIC BLOOD PRESSURE: 140 MMHG | TEMPERATURE: 98 F | RESPIRATION RATE: 19 BRPM

## 2023-05-25 LAB
ALBUMIN SERPL ELPH-MCNC: 5.2 G/DL — SIGNIFICANT CHANGE UP (ref 3.3–5.2)
ALP SERPL-CCNC: 93 U/L — SIGNIFICANT CHANGE UP (ref 40–120)
ALT FLD-CCNC: 18 U/L — SIGNIFICANT CHANGE UP
ANION GAP SERPL CALC-SCNC: 16 MMOL/L — SIGNIFICANT CHANGE UP (ref 5–17)
APTT BLD: 30.5 SEC — SIGNIFICANT CHANGE UP (ref 27.5–35.5)
AST SERPL-CCNC: 28 U/L — SIGNIFICANT CHANGE UP
BASOPHILS # BLD AUTO: 0.06 K/UL — SIGNIFICANT CHANGE UP (ref 0–0.2)
BASOPHILS NFR BLD AUTO: 0.5 % — SIGNIFICANT CHANGE UP (ref 0–2)
BILIRUB SERPL-MCNC: 0.7 MG/DL — SIGNIFICANT CHANGE UP (ref 0.4–2)
BLD GP AB SCN SERPL QL: SIGNIFICANT CHANGE UP
BUN SERPL-MCNC: 11 MG/DL — SIGNIFICANT CHANGE UP (ref 8–20)
CALCIUM SERPL-MCNC: 9.5 MG/DL — SIGNIFICANT CHANGE UP (ref 8.4–10.5)
CHLORIDE SERPL-SCNC: 101 MMOL/L — SIGNIFICANT CHANGE UP (ref 96–108)
CO2 SERPL-SCNC: 22 MMOL/L — SIGNIFICANT CHANGE UP (ref 22–29)
CREAT SERPL-MCNC: 0.56 MG/DL — SIGNIFICANT CHANGE UP (ref 0.5–1.3)
EGFR: 120 ML/MIN/1.73M2 — SIGNIFICANT CHANGE UP
EOSINOPHIL # BLD AUTO: 0.05 K/UL — SIGNIFICANT CHANGE UP (ref 0–0.5)
EOSINOPHIL NFR BLD AUTO: 0.4 % — SIGNIFICANT CHANGE UP (ref 0–6)
GLUCOSE SERPL-MCNC: 73 MG/DL — SIGNIFICANT CHANGE UP (ref 70–99)
HCG SERPL-ACNC: <4 MIU/ML — SIGNIFICANT CHANGE UP
HCT VFR BLD CALC: 41.2 % — SIGNIFICANT CHANGE UP (ref 34.5–45)
HGB BLD-MCNC: 13.8 G/DL — SIGNIFICANT CHANGE UP (ref 11.5–15.5)
IMM GRANULOCYTES NFR BLD AUTO: 0.4 % — SIGNIFICANT CHANGE UP (ref 0–0.9)
INR BLD: 1.03 RATIO — SIGNIFICANT CHANGE UP (ref 0.88–1.16)
LYMPHOCYTES # BLD AUTO: 1.78 K/UL — SIGNIFICANT CHANGE UP (ref 1–3.3)
LYMPHOCYTES # BLD AUTO: 15.6 % — SIGNIFICANT CHANGE UP (ref 13–44)
MCHC RBC-ENTMCNC: 29.8 PG — SIGNIFICANT CHANGE UP (ref 27–34)
MCHC RBC-ENTMCNC: 33.5 GM/DL — SIGNIFICANT CHANGE UP (ref 32–36)
MCV RBC AUTO: 89 FL — SIGNIFICANT CHANGE UP (ref 80–100)
MONOCYTES # BLD AUTO: 0.38 K/UL — SIGNIFICANT CHANGE UP (ref 0–0.9)
MONOCYTES NFR BLD AUTO: 3.3 % — SIGNIFICANT CHANGE UP (ref 2–14)
NEUTROPHILS # BLD AUTO: 9.09 K/UL — HIGH (ref 1.8–7.4)
NEUTROPHILS NFR BLD AUTO: 79.8 % — HIGH (ref 43–77)
PLATELET # BLD AUTO: 536 K/UL — HIGH (ref 150–400)
POTASSIUM SERPL-MCNC: 4.9 MMOL/L — SIGNIFICANT CHANGE UP (ref 3.5–5.3)
POTASSIUM SERPL-SCNC: 4.9 MMOL/L — SIGNIFICANT CHANGE UP (ref 3.5–5.3)
PROT SERPL-MCNC: 8.6 G/DL — SIGNIFICANT CHANGE UP (ref 6.6–8.7)
PROTHROM AB SERPL-ACNC: 12 SEC — SIGNIFICANT CHANGE UP (ref 10.5–13.4)
RBC # BLD: 4.63 M/UL — SIGNIFICANT CHANGE UP (ref 3.8–5.2)
RBC # FLD: 13 % — SIGNIFICANT CHANGE UP (ref 10.3–14.5)
SODIUM SERPL-SCNC: 139 MMOL/L — SIGNIFICANT CHANGE UP (ref 135–145)
TROPONIN T SERPL-MCNC: <0.01 NG/ML — SIGNIFICANT CHANGE UP (ref 0–0.06)
WBC # BLD: 11.41 K/UL — HIGH (ref 3.8–10.5)
WBC # FLD AUTO: 11.41 K/UL — HIGH (ref 3.8–10.5)

## 2023-05-25 PROCEDURE — 86850 RBC ANTIBODY SCREEN: CPT

## 2023-05-25 PROCEDURE — 93005 ELECTROCARDIOGRAM TRACING: CPT

## 2023-05-25 PROCEDURE — 80053 COMPREHEN METABOLIC PANEL: CPT

## 2023-05-25 PROCEDURE — 86900 BLOOD TYPING SEROLOGIC ABO: CPT

## 2023-05-25 PROCEDURE — 36415 COLL VENOUS BLD VENIPUNCTURE: CPT

## 2023-05-25 PROCEDURE — 86901 BLOOD TYPING SEROLOGIC RH(D): CPT

## 2023-05-25 PROCEDURE — 99283 EMERGENCY DEPT VISIT LOW MDM: CPT

## 2023-05-25 PROCEDURE — 84702 CHORIONIC GONADOTROPIN TEST: CPT

## 2023-05-25 PROCEDURE — 85730 THROMBOPLASTIN TIME PARTIAL: CPT

## 2023-05-25 PROCEDURE — 99285 EMERGENCY DEPT VISIT HI MDM: CPT

## 2023-05-25 PROCEDURE — 85610 PROTHROMBIN TIME: CPT

## 2023-05-25 PROCEDURE — 93010 ELECTROCARDIOGRAM REPORT: CPT

## 2023-05-25 PROCEDURE — 85025 COMPLETE CBC W/AUTO DIFF WBC: CPT

## 2023-05-25 PROCEDURE — 84484 ASSAY OF TROPONIN QUANT: CPT

## 2023-05-25 NOTE — ED PROVIDER NOTE - OBJECTIVE STATEMENT
Patient is a 37-year-old female with past medical history of HTN, leaky valve, iron deficiency anemia who presents to the ED complaining of menorrhagia and presyncope.  Patient states that she passed a large blood clot around noon and then soon after felt very lightheaded, dizzy, nauseous with substernal, tight chest pain, shortness of breath.  Denies ever feeling like this before, denies fever, chills, headache.  Patient states her.  Was supposed to come last week, is not usually this heavy.  OB history significant for 5 NSVDs, last one in 2021 complicated by preeclampsia.  Sees Dr. Posada with [facility], last seen in January.

## 2023-05-25 NOTE — ED ADULT TRIAGE NOTE - CHIEF COMPLAINT QUOTE
C/O midsternum chest pain that began this afternoon while getting ready. +SOB and cough. Hx of HTN and leaky valve.

## 2023-05-25 NOTE — ED PROVIDER NOTE - NS ED ROS FT
General: No fever, chills.  EENT: No vision changes, hearing changes, nasal congestion, throat pain, difficulty swallowing.  Respiratory: No SOB, cough, wheezing.  Cardiac: + chest pain. No palpitations, lower extremity edema.  GI: + abdominal pain, nausea. No vomiting, diarrhea, constipation.  : No difficulty with urination, pain with urination, hematuria.  Skin: No rashes.  Neuro: No headache. + dizziness, lightheadedness.  MSK: No muscle pain, joint pain, back pain.  Psych: No known mental health issues.  Endocrine: No heat/cold intolerance, no polyuria/polydipsia.  Heme: No easy bruising or bleeding.  Allergic: No pruritis, dermatitis, or environmental allergies.

## 2023-05-25 NOTE — ED ADULT NURSE NOTE - OBJECTIVE STATEMENT
Pt A&O x 4 presents to ED c/o vaginal bleed and palpitations starting this AM. Pt reports LMP 3 weeks ago. c/o mild, tolerable lower abdominal pain. Denies lightheadedness or dizziness. Denies N/V. Denies dysuria. IV access obtained; specimens sent to lab. Pt on telemonitor. Bed locked and in lowest position. Call bell within reach. Frequent checks made.

## 2023-05-25 NOTE — ED PROVIDER NOTE - NSFOLLOWUPINSTRUCTIONS_ED_ALL_ED_FT
1. Return to ED for worsening, progressive or any other concerning symptoms   2. Follow up with your primary care doctor in 2-3days     Syncope    Syncope is when you temporarily lose consciousness, also called fainting or passing out. It is caused by a sudden decrease in blood flow to the brain. Even though most causes of syncope are not dangerous, syncope can possibly be a sign of a serious medical problem. Signs that you may be about to faint include feeling dizzy, lightheaded, nausea, visual changes, or cold/clammy skin. Do not drive, operate heavy machinery, or play sports until your health care provider says it is okay.    SEEK IMMEDIATE MEDICAL CARE IF YOU HAVE ANY OF THE FOLLOWING SYMPTOMS: severe headache, pain in your chest/abdomen/back, bleeding from your mouth or rectum, palpitations, shortness of breath, pain with breathing, seizure, confusion, or trouble walking.

## 2023-05-25 NOTE — ED PROVIDER NOTE - PATIENT PORTAL LINK FT
You can access the FollowMyHealth Patient Portal offered by [facility] by registering at the following website: [email or web address]. By joining [employer]’s FollowMyHealth portal, you will also be able to view your health information using other applications (apps) compatible with our system.

## 2023-05-25 NOTE — ED PROVIDER NOTE - PHYSICAL EXAMINATION
Gen: AAOx3, NAD, well nourished  HEENT: Normocephalic atraumatic. EOMI. No scleral icterus. Moist mucus membranes.  CV: RRR. Audible S1 and S2. No murmurs. 2+ radial and PT pulses   Pulm: Clear to auscultation bilaterally. No wheezes, rales, or rhonchi. No accessory muscle use or respiratory distress.  Abdomen: soft, normoactive BS, non distended, LLQ tenderness to palpation, no rebound, no guarding  Musculoskeletal:  Moving all extremities equally. No gross deformity. No tenderness to palpation.  Skin: No rashes or lesions. Warm.  Neurologic: No focal neurological deficits. CN II-XII grossly intact.  : no CVA tenderness  Psych: Appropriate mood and affect. Cooperative.

## 2023-05-25 NOTE — ED PROVIDER NOTE - PROGRESS NOTE DETAILS
The patient is leaving against medical advice. The patient has the capacity to refuse further medical evaluation and care. I had a lengthy discussion with the patient in which appropriate further evaluation and treatment was offered to the patient as well as acceptable alternative measures, and they declined. The patient understands that as a consequence of this decision they may be at risk for clinical deterioration including ACS, arrythmia, hemorrhage, permanent disability and death. The patient understands and verbalized the risks of leaving without further evaulation and treatment. Clear return precautions were discussed. The patient was urged to seek follow-up care, with appropriate referrals made as needed. -Trent DO

## 2023-05-25 NOTE — ED PROVIDER NOTE - CLINICAL SUMMARY MEDICAL DECISION MAKING FREE TEXT BOX
Patient is a 37-year-old female with past medical history of HTN, leaky valve, iron deficiency anemia who presents to the ED complaining of menorrhagia and presyncope. Will get screening labs and transvaginal ultrasound

## 2023-05-25 NOTE — ED ADULT NURSE NOTE - NSFALLRISKINTERV_ED_ALL_ED

## 2023-05-25 NOTE — ED PROVIDER NOTE - ATTENDING CONTRIBUTION TO CARE
I, Nara Newman, have personally seen and examined this patient. I have fully participated in the care of this patient. I have reviewed all pertinent clinical information, including history, physical exam, plan and the Resident's note and agree except as noted below.     Pt with missed period and heavy bleeding today which episode of heavy bleeedng with cramps/clot also occurred with CP. exam unremarbkle. EKG nonischemic. trop x1 negative. recommend 2nd trop and TVUS to eval for bleeding. patient is not anemia or pregnant. will DC as AMA and outpt Follow up

## 2023-08-18 ENCOUNTER — EMERGENCY (EMERGENCY)
Facility: HOSPITAL | Age: 38
LOS: 1 days | Discharge: HOME | End: 2023-08-18
Attending: EMERGENCY MEDICINE
Payer: MEDICAID

## 2023-08-18 VITALS
HEART RATE: 66 BPM | OXYGEN SATURATION: 96 % | RESPIRATION RATE: 17 BRPM | SYSTOLIC BLOOD PRESSURE: 156 MMHG | TEMPERATURE: 98 F | WEIGHT: 151.9 LBS | DIASTOLIC BLOOD PRESSURE: 103 MMHG

## 2023-08-18 LAB
ALBUMIN SERPL ELPH-MCNC: 4.6 G/DL — SIGNIFICANT CHANGE UP (ref 3.3–5.2)
ALP SERPL-CCNC: 54 U/L — SIGNIFICANT CHANGE UP (ref 40–120)
ALT FLD-CCNC: 8 U/L — SIGNIFICANT CHANGE UP
ANION GAP SERPL CALC-SCNC: 12 MMOL/L — SIGNIFICANT CHANGE UP (ref 5–17)
AST SERPL-CCNC: 13 U/L — SIGNIFICANT CHANGE UP
BASOPHILS # BLD AUTO: 0.03 K/UL — SIGNIFICANT CHANGE UP (ref 0–0.2)
BASOPHILS NFR BLD AUTO: 0.4 % — SIGNIFICANT CHANGE UP (ref 0–2)
BILIRUB SERPL-MCNC: 0.5 MG/DL — SIGNIFICANT CHANGE UP (ref 0.4–2)
BUN SERPL-MCNC: 10.6 MG/DL — SIGNIFICANT CHANGE UP (ref 8–20)
CALCIUM SERPL-MCNC: 9.1 MG/DL — SIGNIFICANT CHANGE UP (ref 8.4–10.5)
CHLORIDE SERPL-SCNC: 106 MMOL/L — SIGNIFICANT CHANGE UP (ref 96–108)
CO2 SERPL-SCNC: 24 MMOL/L — SIGNIFICANT CHANGE UP (ref 22–29)
CREAT SERPL-MCNC: 0.72 MG/DL — SIGNIFICANT CHANGE UP (ref 0.5–1.3)
EGFR: 110 ML/MIN/1.73M2 — SIGNIFICANT CHANGE UP
EOSINOPHIL # BLD AUTO: 0.02 K/UL — SIGNIFICANT CHANGE UP (ref 0–0.5)
EOSINOPHIL NFR BLD AUTO: 0.3 % — SIGNIFICANT CHANGE UP (ref 0–6)
GLUCOSE SERPL-MCNC: 95 MG/DL — SIGNIFICANT CHANGE UP (ref 70–99)
HCG SERPL-ACNC: 136.3 MIU/ML — HIGH
HCT VFR BLD CALC: 35.9 % — SIGNIFICANT CHANGE UP (ref 34.5–45)
HGB BLD-MCNC: 12.4 G/DL — SIGNIFICANT CHANGE UP (ref 11.5–15.5)
IMM GRANULOCYTES NFR BLD AUTO: 0.4 % — SIGNIFICANT CHANGE UP (ref 0–0.9)
LYMPHOCYTES # BLD AUTO: 1.47 K/UL — SIGNIFICANT CHANGE UP (ref 1–3.3)
LYMPHOCYTES # BLD AUTO: 21.6 % — SIGNIFICANT CHANGE UP (ref 13–44)
MCHC RBC-ENTMCNC: 30.5 PG — SIGNIFICANT CHANGE UP (ref 27–34)
MCHC RBC-ENTMCNC: 34.5 GM/DL — SIGNIFICANT CHANGE UP (ref 32–36)
MCV RBC AUTO: 88.4 FL — SIGNIFICANT CHANGE UP (ref 80–100)
MONOCYTES # BLD AUTO: 0.3 K/UL — SIGNIFICANT CHANGE UP (ref 0–0.9)
MONOCYTES NFR BLD AUTO: 4.4 % — SIGNIFICANT CHANGE UP (ref 2–14)
NEUTROPHILS # BLD AUTO: 4.94 K/UL — SIGNIFICANT CHANGE UP (ref 1.8–7.4)
NEUTROPHILS NFR BLD AUTO: 72.9 % — SIGNIFICANT CHANGE UP (ref 43–77)
PLATELET # BLD AUTO: 355 K/UL — SIGNIFICANT CHANGE UP (ref 150–400)
POTASSIUM SERPL-MCNC: 4.5 MMOL/L — SIGNIFICANT CHANGE UP (ref 3.5–5.3)
POTASSIUM SERPL-SCNC: 4.5 MMOL/L — SIGNIFICANT CHANGE UP (ref 3.5–5.3)
PROT SERPL-MCNC: 7.3 G/DL — SIGNIFICANT CHANGE UP (ref 6.6–8.7)
RBC # BLD: 4.06 M/UL — SIGNIFICANT CHANGE UP (ref 3.8–5.2)
RBC # FLD: 13.4 % — SIGNIFICANT CHANGE UP (ref 10.3–14.5)
SODIUM SERPL-SCNC: 141 MMOL/L — SIGNIFICANT CHANGE UP (ref 135–145)
WBC # BLD: 6.79 K/UL — SIGNIFICANT CHANGE UP (ref 3.8–10.5)
WBC # FLD AUTO: 6.79 K/UL — SIGNIFICANT CHANGE UP (ref 3.8–10.5)

## 2023-08-18 PROCEDURE — 85025 COMPLETE CBC W/AUTO DIFF WBC: CPT

## 2023-08-18 PROCEDURE — 84702 CHORIONIC GONADOTROPIN TEST: CPT

## 2023-08-18 PROCEDURE — 76801 OB US < 14 WKS SINGLE FETUS: CPT | Mod: 26

## 2023-08-18 PROCEDURE — 76801 OB US < 14 WKS SINGLE FETUS: CPT

## 2023-08-18 PROCEDURE — 76817 TRANSVAGINAL US OBSTETRIC: CPT | Mod: 26

## 2023-08-18 PROCEDURE — 99284 EMERGENCY DEPT VISIT MOD MDM: CPT

## 2023-08-18 PROCEDURE — 80053 COMPREHEN METABOLIC PANEL: CPT

## 2023-08-18 PROCEDURE — 36415 COLL VENOUS BLD VENIPUNCTURE: CPT

## 2023-08-18 PROCEDURE — 76817 TRANSVAGINAL US OBSTETRIC: CPT

## 2023-08-18 NOTE — ED ADULT TRIAGE NOTE - CHIEF COMPLAINT QUOTE
biba - 4 weeks gestations c/o vag bleed, generalized abd pain onset yesterday. hx HTN outside of pregnancy, has not been on HTN meds x 1 month .

## 2023-08-18 NOTE — ED PROVIDER NOTE - PATIENT PORTAL LINK FT
You can access the FollowMyHealth Patient Portal offered by [facility] by registering at the following website: [email or web address]. By joining [employer]’s FollowMyHealth portal, you will also be able to view your health information using other applications (apps) compatible with our system.

## 2023-08-18 NOTE — ED PROVIDER NOTE - CLINICAL SUMMARY MEDICAL DECISION MAKING FREE TEXT BOX
37 y/ oF with pregnancy of unknown anatomic location - patient in no acute distress - labs, sono, f/u with OB

## 2023-08-18 NOTE — ED PROVIDER NOTE - OBJECTIVE STATEMENT
36 y/o F c/o vaginal bleeding when using the bathroom this morning.  LMP 7/21.  + home pregnancy test.  Denies dysuria.

## 2023-08-18 NOTE — ED PROVIDER NOTE - ATTENDING APP SHARED VISIT CONTRIBUTION OF CARE
The patient seen and examined    Pregnancy    I, Lonnie Posada, performed the initial face to face bedside interview with this patient regarding history of present illness, review of symptoms and relevant past medical, social and family history.  I completed an independent physical examination.  I was the initial provider who evaluated this patient. I have signed out the follow up of any pending tests (i.e. labs, radiological studies) to the ACP.  I have communicated the patient’s plan of care and disposition with the ACP.

## 2023-10-03 NOTE — ED ADULT NURSE NOTE - NSSISCREENINGQ5_ED_A_ED
10/3/2023: Care Coordination     CC: arranged transportation for an up coming in clinic appointment on: 10/16/2023 @ 8:20 am. [employer] ride should arrive between: 7:40 am - 7:50 am. When ready to return home, he will need to activate the will call service by calling [employer] at:[phone]. The patient has been reminded of the appointment and given ride details.        Rakesh Foster Sr.   Care Coordination  [address] [address]  [email or web address]   Office: [phone]  Direct: [phone]  [facility]   No

## 2024-01-01 NOTE — ED ADULT NURSE NOTE - NS ED NURSE DC TEACHING
SW/CM Pediatric Initial Plan of Care Note     Baseline Assessment  Received referral from admission standard for Admission for an assessment related to D/C Plan.     4 month oldadmitted 2024 with a diagnosis of Seizure  (CMD) [R56.9].      SW/CM met with mom in person. SW/CM explained role, reason for involvement, and support available from hospital staff. Patient's parents/family were encouraged to call SW/CM with questions or concerns.     Patient/Family Information  Parents/Guardian Legal Names: Sandy Miranda   Parents/Guardian Phone Number: [phone]  Patient’s legal decision maker(s) is/are parents.   Prior to admission patient was living with mother, grandparents. and residing at [address] [place] [address].    Services prior to admission  Type of Services Prior to Hospitalization: None  Support Systems: Parent   Home Devices/Equipment: None             SDOH   Parents denied need for assistance with transportation resources to.      Parents denied need for assistance with food insecurity.      SW/CM will follow up with parents/family to provide all necessary resources.     Medical  Patient's pediatrician is Young Mondragon MD.     History reviewed. No pertinent past medical history.    Insurance  Primary: [place] MEDICA[place]  Secondary: N/A    Barriers to Discharge  Identified Barriers to Discharge/Transition Planning: none     Parent/Family Goals  Patient/Family Discharge Goal: Home          Additional comments  [facility] [address], [place], [address]    Plan  Patient/Family Discharge Goal: Patient's Anticipated Discharge Needs: Home with no anticipated needs  Patient/Family Discharge Goal: Home  Patient/Family Agree to Plan: Patient unable to agree with goals & plan, Family/S.O./Caregiver agrees    Anticipate patient will not need post-hospital services. Necessary services are available.    SW/CM will continue to follow and support family during patient stay.          Naye  Carlene RN, BSN, CPN  Tie:       OB/GYN

## 2024-01-01 NOTE — ED ADULT NURSE NOTE - AS SC BRADEN NUTRITION
Pleural Ultrasound    Bilateral pleural ultrasound completed:    R lung without consolidation or effusion, > 4 B Lines  L lung with dense consolidation at L base, no effusion, 3-4 B Lines (3) adequate 1

## 2024-01-23 ENCOUNTER — EMERGENCY (EMERGENCY)
Facility: HOSPITAL | Age: 39
LOS: 1 days | Discharge: HOME | End: 2024-01-23
Attending: EMERGENCY MEDICINE
Payer: MEDICAID

## 2024-01-23 VITALS
RESPIRATION RATE: 18 BRPM | WEIGHT: 147.27 LBS | SYSTOLIC BLOOD PRESSURE: 172 MMHG | OXYGEN SATURATION: 98 % | HEIGHT: 67 IN | HEART RATE: 104 BPM | TEMPERATURE: 99 F | DIASTOLIC BLOOD PRESSURE: 110 MMHG

## 2024-01-23 LAB
FLUAV AG NPH QL: SIGNIFICANT CHANGE UP
FLUBV AG NPH QL: SIGNIFICANT CHANGE UP
RSV RNA NPH QL NAA+NON-PROBE: SIGNIFICANT CHANGE UP
S PYO DNA THROAT QL NAA+PROBE: DETECTED
SARS-COV-2 RNA SPEC QL NAA+PROBE: SIGNIFICANT CHANGE UP

## 2024-01-23 PROCEDURE — 99284 EMERGENCY DEPT VISIT MOD MDM: CPT

## 2024-01-23 PROCEDURE — 87637 SARSCOV2&INF A&B&RSV AMP PRB: CPT

## 2024-01-23 PROCEDURE — 87798 DETECT AGENT NOS DNA AMP: CPT

## 2024-01-23 PROCEDURE — 87651 STREP A DNA AMP PROBE: CPT

## 2024-01-23 PROCEDURE — 99283 EMERGENCY DEPT VISIT LOW MDM: CPT

## 2024-01-23 RX ORDER — IBUPROFEN 200 MG
600 TABLET ORAL ONCE
Refills: 0 | Status: COMPLETED | OUTPATIENT
Start: 2024-01-23 | End: 2024-01-23

## 2024-01-23 RX ORDER — ACETAMINOPHEN 500 MG
975 TABLET ORAL ONCE
Refills: 0 | Status: COMPLETED | OUTPATIENT
Start: 2024-01-23 | End: 2024-01-23

## 2024-01-23 RX ORDER — DEXAMETHASONE 0.5 MG/5ML
10 ELIXIR ORAL ONCE
Refills: 0 | Status: COMPLETED | OUTPATIENT
Start: 2024-01-23 | End: 2024-01-23

## 2024-01-23 RX ADMIN — Medication 600 MILLIGRAM(S): at 05:08

## 2024-01-23 RX ADMIN — Medication 1 TABLET(S): at 05:49

## 2024-01-23 RX ADMIN — Medication 10 MILLIGRAM(S): at 05:08

## 2024-01-23 RX ADMIN — Medication 975 MILLIGRAM(S): at 05:08

## 2024-01-23 NOTE — ED ADULT NURSE NOTE - NSFALLUNIVINTERV_ED_ALL_ED
Bed/Stretcher in lowest position, wheels locked, appropriate side rails in place/Call bell, personal items and telephone in reach/Instruct patient to call for assistance before getting out of bed/chair/stretcher/Non-slip footwear applied when patient is off stretcher/[place] to call system/Physically safe environment - no spills, clutter or unnecessary equipment/Purposeful proactive rounding/Room/bathroom lighting operational, light cord in reach

## 2024-01-23 NOTE — ED PROVIDER NOTE - PHYSICAL EXAMINATION
Gen: No acute distress, non toxic  HEENT: Mucous membranes moist, pink conjunctivae, EOMI. +Bilateral tonsillar swelling/erythema, no exudate noted. Uvula midline.    CV: RRR, nl s1/s2.  Resp: CTAB, normal rate and effort  GI: Abdomen soft, NT, ND. No rebound, no guarding  : No CVAT  Neuro: A&O x 3, moving all 4 extremities  MSK: No spine or joint tenderness to palpation  Skin: No rashes. intact and perfused.

## 2024-01-23 NOTE — ED PROVIDER NOTE - PATIENT PORTAL LINK FT
You can access the FollowMyHealth Patient Portal offered by [facility] by registering at the following website: [email or web address]. By joining [employer]’s FollowMyHealth portal, you will also be able to view your health information using other applications (apps) compatible with our system.

## 2024-01-23 NOTE — ED PROVIDER NOTE - NSFOLLOWUPINSTRUCTIONS_ED_ALL_ED_FT
Please take antibiotics as prescribed  Please take ibuprofen 600mg every 6 hours as needed for pain or fever  Please take tylenol 650mg every 6hours as needed for pain or fever  Follow up with primary care doctor in 2-3 days  Return to ER for new or worsening symptoms    Strep Throat  ImageStrep throat is a bacterial infection of the throat. Your health care provider may call the infection tonsillitis or pharyngitis, depending on whether there is swelling in the tonsils or at the back of the throat. Strep throat is most common during the cold months of the year in children who are 5–15 years of age, but it can happen during any season in people of any age. This infection is spread from person to person (contagious) through coughing, sneezing, or close contact.    What are the causes?  Strep throat is caused by the bacteria called Streptococcus pyogenes.    What increases the risk?  This condition is more likely to develop in:    People who spend time in crowded places where the infection can spread easily.  People who have close contact with someone who has strep throat.    What are the signs or symptoms?  Symptoms of this condition include:    Fever or chills.  Redness, swelling, or pain in the tonsils or throat.  Pain or difficulty when swallowing.  White or yellow spots on the tonsils or throat.  Swollen, tender glands in the neck or under the jaw.  Red rash all over the body (rare).    How is this diagnosed?  This condition is diagnosed by performing a rapid strep test or by taking a swab of your throat (throat culture test). Results from a rapid strep test are usually ready in a few minutes, but throat culture test results are available after one or two days.    How is this treated?  This condition is treated with antibiotic medicine.    Follow these instructions at home:  Medicines     Take over-the-counter and prescription medicines only as told by your health care provider.  Take your antibiotic as told by your health care provider. Do not stop taking the antibiotic even if you start to feel better.  Have family members who also have a sore throat or fever tested for strep throat. They may need antibiotics if they have the strep infection.  Eating and drinking     Do not share food, drinking cups, or personal items that could cause the infection to spread to other people.  If swallowing is difficult, try eating soft foods until your sore throat feels better.  Drink enough fluid to keep your urine clear or pale yellow.  General instructions     Gargle with a salt-water mixture 3–4 times per day or as needed. To make a salt-water mixture, completely dissolve ½–1 tsp of salt in 1 cup of warm water.  Make sure that all household members wash their hands well.  Get plenty of rest.  Stay home from school or work until you have been taking antibiotics for 24 hours.  Keep all follow-up visits as told by your health care provider. This is important.  Contact a health care provider if:  The glands in your neck continue to get bigger.  You develop a rash, cough, or earache.  You cough up a thick liquid that is green, yellow-brown, or bloody.  You have pain or discomfort that does not get better with medicine.  Your problems seem to be getting worse rather than better.  You have a fever.  Get help right away if:  You have new symptoms, such as vomiting, severe headache, stiff or painful neck, chest pain, or shortness of breath.  You have severe throat pain, drooling, or changes in your voice.  You have swelling of the neck, or the skin on the neck becomes red and tender.  You have signs of dehydration, such as fatigue, dry mouth, and decreased urination.  You become increasingly sleepy, or you cannot wake up completely.  Your joints become red or painful.  This information is not intended to replace advice given to you by your health care provider. Make sure you discuss any questions you have with your health care provider.

## 2024-01-23 NOTE — ED ADULT NURSE NOTE - OBJECTIVE STATEMENT
Pt is A&Ox4. Respirations are even and unlabored. Color is appropriate for race. Skin warm and dry. Pt reports sore throat, body aches, chills since Saturday night. Throat appears red. ED provider evaluating, plan of care ongoing.

## 2024-01-23 NOTE — ED PROVIDER NOTE - CLINICAL SUMMARY MEDICAL DECISION MAKING FREE TEXT BOX
39 yo F presents c/o sore throat x 3 days, subjective fever/chills, body aches. afebrile in ED. +b/l tonsillar swelling/erythema, no exudates. lungs cta. will send strep and viral swab, medicate, reassess.  hx HTN non compliant w meds, hypertensive in ED but asymptomatic. discussed long term risks of untreated HTN and importance of med compliance 37 yo F presents c/o sore throat x 3 days, subjective fever/chills, body aches. afebrile in ED. +b/l tonsillar swelling/erythema, no exudates. lungs cta. will send strep and viral swab, medicate, reassess.  hx HTN non compliant w meds, hypertensive in ED but asymptomatic. discussed long term risks of untreated HTN and importance of med compliance, pt states she will f/u pcp.    +strep- will dc on ABX

## 2024-01-23 NOTE — ED PROVIDER NOTE - OBJECTIVE STATEMENT
37 yo F hx HTN presents c/o sore throat x 3 days, subjective fever/chills, body aches. Notes mild nasal congestion and slight cough that started today. No n/v/d. Pt has hx HTN and reports she hasn't taken her meds in a few months. denies CP/SOB/HA.

## 2024-03-22 NOTE — ED ADULT TRIAGE NOTE - CHIEF COMPLAINT QUOTE
Gastroenterology Progress Note               Author:  SIMBA Ospina Date & Time Created: 3/22/2024 1:22 PM       Patient ID:  Name:             Manolo Puri  YOB: 1942  Age:                 81 y.o.  male  MRN:               [id number]        Medical Decision Making, by Problem:  Active Hospital Problems    Diagnosis     Acute kidney injury (HCC) [N17.9]     Gallstone pancreatitis [K85.10]     Total bilirubin, elevated [R17]     Type 2 diabetes mellitus with diabetic polyneuropathy, with long-term current use of insulin (HCC) [E11.42, Z79.4]            Presenting Chief Complaint:  Gallstone pancreatitis     History of Present Illness:   81-year-old male with several days of abdominal pain, nausea, vomiting.  Patient evaluated in the emergency room this morning.  Patient has hyperbilirubinemia and an elevated lipase.  He has an obstructive biochemical liver profile.  CT scan with contrast performed.  Findings consistent with interstitial edematous pancreatitis.  CBD does not demonstrate stone.  Patient feeling quite a bit better since having received pain medications.  Surgery admitting.  Patient started on Zosyn and given lactated Ringer's bolus along with continued moderate hydration.  Patient has a history of gallstone pancreatitis dating back to last year.  No interventions at that time were pursued per patient request.      Interval History:  3/21/2024: Patient seen at bedside.  He is postprocedure day #1 s/p laparoscopic converted to open cholecystectomy with drain placement.  He reports feeling well without nausea, vomiting, denies abdominal pain.  Serosanguineous drainage noted to right upper quadrant JOVANA drain.  NG tube in place.  AST mildly elevated 54, ALT 77, alk phos down trended 95.  Total bilirubin continues to downtrend to 1.8 this morning.    3/22/2024: Patient seen at bedside.  He is sitting in chair, stating he will be ambulating with staff later today.  With  small amount of nonbilious serosanguineous fluid in JOVANA drain.  NG tube removed yesterday, denies nausea, vomiting.  Denies bowel movement but is passing flatus.    Hospital Medications:  Current Facility-Administered Medications   Medication Dose Frequency Provider Last Rate Last Admin    HYDROmorphone (Dilaudid) 0.2 mg/mL in 50 mL NS (PCA)   Continuous Sara Ulloa D.N.P.   Rate Verify at 03/22/24 0720    And    Pharmacy Consult Request ...Pain Management Review 1 Each  1 Each PHARMACY TO DOSE Sara Ulloa D.N.P.        insulin regular (HumuLIN R,NovoLIN R) injection  1-6 Units Q6HRS Daniel Jameson M.D.   2 Units at 03/22/24 1304    And    dextrose 10 % BOLUS 25 g  25 g Q15 MIN PRN Daniel Jameson M.D.        Respiratory Therapy Consult   Continuous RT Daniel Jameson M.D.        ondansetron (Zofran) syringe/vial injection 4 mg  4 mg Q4HRS PRN Daniel Jameson M.D.   4 mg at 03/21/24 1838    docusate sodium (Colace) capsule 100 mg  100 mg BID Daniel Jameson M.D.   100 mg at 03/19/24 1711    senna-docusate (Pericolace Or Senokot S) 8.6-50 MG per tablet 1 Tablet  1 Tablet Nightly Daniel Jameson M.D.   1 Tablet at 03/19/24 2041    senna-docusate (Pericolace Or Senokot S) 8.6-50 MG per tablet 1 Tablet  1 Tablet Q24HRS PRN Daniel Jameson M.D.        polyethylene glycol/lytes (Miralax) Packet 1 Packet  1 Packet BID Daniel Jameson M.D.   1 Packet at 03/19/24 1711    magnesium hydroxide (Milk Of Magnesia) suspension 30 mL  30 mL DAILY Daniel Jameson M.D.   30 mL at 03/19/24 0449    bisacodyl (Dulcolax) suppository 10 mg  10 mg Q24HRS PRN Daniel Jameson M.D.        sodium phosphate (Fleet) enema 133 mL  1 Each Once PRN Daniel E Mock, M.D.        LR infusion   Continuous Daniel Jameson M.D. 100 mL/hr at 03/21/24 1835 New Bag at 03/21/24 1835    enoxaparin (Lovenox) inj 40 mg  40 mg DAILY AT 1800 Daniel Jameson M.D.   40 mg at 03/21/24 1724    acetaminophen (Tylenol) tablet 1,000 mg  1,000 mg Q6HRS Daniel Jameson M.D.   1,000 mg at 03/22/24  1251    Followed by    [START ON 3/23/2024] acetaminophen (Tylenol) tablet 1,000 mg  1,000 mg Q6HRS PRN Daniel Jameson M.D.        piperacillin-tazobactam (Zosyn) 3.375 g in  mL IVPB  3.375 g Q8HRS Daniel Jameson M.D. 25 mL/hr at 03/22/24 1258 3.375 g at 03/22/24 1258    famotidine (Pepcid) tablet 20 mg  20 mg DAILY AT 1800 Daniel Jameson M.D.   20 mg at 03/19/24 1711    Or    famotidine (Pepcid) injection 20 mg  20 mg DAILY AT 1800 Daniel Jameson M.D.   20 mg at 03/21/24 1723   Last reviewed on 3/18/2024 12:24 PM by Anjali Raines, T       Review of Systems:  Review of Systems   Constitutional:  Positive for malaise/fatigue. Negative for chills and fever.   HENT:  Negative for congestion and sore throat.    Respiratory:  Negative for cough and shortness of breath.    Cardiovascular:  Negative for chest pain and leg swelling.   Gastrointestinal:  Negative for abdominal pain, blood in stool, constipation, diarrhea, nausea and vomiting.   Genitourinary:  Negative for dysuria and flank pain.   Musculoskeletal:  Negative for falls and myalgias.   Neurological:  Positive for weakness. Negative for headaches.   Psychiatric/Behavioral:  The patient is not nervous/anxious.    All other systems reviewed and are negative.        Vital signs:  Weight/BMI: Body mass index is 30.68 kg/m².  BP (!) 160/101   Pulse 100   Temp 36.6 °C (97.9 °F) (Temporal)   Resp 20   Ht 1.829 m (6')   Wt 103 kg (226 lb 3.1 oz)   SpO2 96%   Vitals:    03/22/24 0443 03/22/24 0527 03/22/24 0746 03/22/24 1126   BP:  (!) 152/91 (!) 153/87 (!) 160/101   Pulse: 100  (!) 112 100   Resp: 20 17 18 20   Temp: 36.7 °C (98.1 °F)  36.2 °C (97.2 °F) 36.6 °C (97.9 °F)   TempSrc: Temporal  Temporal Temporal   SpO2: 96% 93% 90% 96%   Weight:       Height:         Oxygen Therapy:  Pulse Oximetry: 96 %, O2 (LPM): 0, O2 Delivery Device: None - Room Air    Intake/Output Summary (Last 24 hours) at 3/22/2024 1322  Last data filed at 3/22/2024 1132  Gross per 24 hour    Intake 29.2 ml   Output 1865 ml   Net -1835.8 ml         Physical Exam:  Physical Exam  Vitals and nursing note reviewed.   Constitutional:       General: He is awake. He is not in acute distress.     Appearance: He is obese. He is ill-appearing. He is not toxic-appearing.   HENT:      Head: Normocephalic.      Nose: Nose normal. No congestion.      Mouth/Throat:      Mouth: Mucous membranes are moist.      Pharynx: Oropharynx is clear. No oropharyngeal exudate.   Eyes:      General: No scleral icterus.     Extraocular Movements: Extraocular movements intact.      Conjunctiva/sclera: Conjunctivae normal.   Cardiovascular:      Rate and Rhythm: Normal rate and regular rhythm.      Pulses: Normal pulses.      Heart sounds: Normal heart sounds. No murmur heard.  Pulmonary:      Effort: Pulmonary effort is normal. No respiratory distress.      Breath sounds: Normal breath sounds.   Abdominal:      General: Bowel sounds are normal. There is no distension.      Palpations: Abdomen is soft.      Tenderness: There is abdominal tenderness (Mild right upper quadrant tenderness with palpation.). There is no guarding.      Comments: Surgical dressing to right upper quadrant clean dry and intact.  Periumbilical laparoscopic incision dressing clean dry and intact.  Right upper quadrant JOVANA drain with small amount of nonbilious serosanguineous output   Musculoskeletal:      Right lower leg: No edema.      Left lower leg: No edema.   Skin:     General: Skin is warm and dry.      Capillary Refill: Capillary refill takes less than 2 seconds.      Coloration: Skin is not jaundiced.      Findings: No bruising.   Neurological:      General: No focal deficit present.      Mental Status: He is alert and oriented to person, place, and time. Mental status is at baseline.      Motor: No weakness.   Psychiatric:         Mood and Affect: Mood normal.         Behavior: Behavior is cooperative.         Thought Content: Thought content normal.          Judgment: Judgment normal.             Labs:  Recent Labs     03/20/24  0529 03/21/24  0149 03/22/24  0508   SODIUM 138 139 143   POTASSIUM 4.1 4.9 4.7   CHLORIDE 103 102 110   CO2 21 16* 20   BUN 30* 39* 33*   CREATININE 1.44* 1.79* 1.48*   MAGNESIUM  --  2.4  --    PHOSPHORUS  --  4.4  --    CALCIUM 8.2* 8.1* 8.1*     Recent Labs     03/20/24 0529 03/21/24  0149 03/22/24  0508   ALTSGPT 58* 77* 50   ASTSGOT 22 54* 32   ALKPHOSPHAT 222* 195* 300*   TBILIRUBIN 2.6* 1.8* 1.6*   GLUCOSE 136* 275* 218*     Recent Labs     03/20/24 0529 03/21/24 0149 03/22/24  0508   WBC 15.0* 19.6* 14.1*   NEUTSPOLYS 76.70* 82.00* 69.50   LYMPHOCYTES 5.30* 3.60* 8.30*   MONOCYTES 13.30 9.60 14.40*   EOSINOPHILS 2.10 0.10 2.80   BASOPHILS 0.70 0.70 1.10   ASTSGOT 22 54* 32   ALTSGPT 58* 77* 50   ALKPHOSPHAT 222* 195* 300*   TBILIRUBIN 2.6* 1.8* 1.6*     Recent Labs     03/20/24 0529 03/21/24 0149 03/22/24  0508   RBC 5.13 5.02 4.75   HEMOGLOBIN 14.4 13.7* 13.1*   HEMATOCRIT 42.5 43.3 42.3   PLATELETCT 254 319 293     Recent Results (from the past 24 hour(s))   POCT glucose device results    Collection Time: 03/21/24  5:26 PM   Result Value Ref Range    POC Glucose, Blood 210 (H) 65 - 99 mg/dL   POCT glucose device results    Collection Time: 03/21/24 11:22 PM   Result Value Ref Range    POC Glucose, Blood 201 (H) 65 - 99 mg/dL   CBC with Differential: Tomorrow AM    Collection Time: 03/22/24  5:08 AM   Result Value Ref Range    WBC 14.1 (H) 4.8 - 10.8 K/uL    RBC 4.75 4.70 - 6.10 M/uL    Hemoglobin 13.1 (L) 14.0 - 18.0 g/dL    Hematocrit 42.3 42.0 - 52.0 %    MCV 89.1 81.4 - 97.8 fL    MCH 27.6 27.0 - 33.0 pg    MCHC 31.0 (L) 32.3 - 36.5 g/dL    RDW 49.7 35.9 - 50.0 fL    Platelet Count 293 164 - 446 K/uL    MPV 10.3 9.0 - 12.9 fL    Neutrophils-Polys 69.50 44.00 - 72.00 %    Lymphocytes 8.30 (L) 22.00 - 41.00 %    Monocytes 14.40 (H) 0.00 - 13.40 %    Eosinophils 2.80 0.00 - 6.90 %    Basophils 1.10 0.00 - 1.80 %    Immature  Granulocytes 3.90 (H) 0.00 - 0.90 %    Nucleated RBC 0.00 0.00 - 0.20 /100 WBC    Neutrophils (Absolute) 9.78 (H) 1.82 - 7.42 K/uL    Lymphs (Absolute) 1.17 1.00 - 4.80 K/uL    Monos (Absolute) 2.03 (H) 0.00 - 0.85 K/uL    Eos (Absolute) 0.39 0.00 - 0.51 K/uL    Baso (Absolute) 0.16 (H) 0.00 - 0.12 K/uL    Immature Granulocytes (abs) 0.55 (H) 0.00 - 0.11 K/uL    NRBC (Absolute) 0.00 K/uL   Comp Metabolic Panel (CMP): Tomorrow AM    Collection Time: 03/22/24  5:08 AM   Result Value Ref Range    Sodium 143 135 - 145 mmol/L    Potassium 4.7 3.6 - 5.5 mmol/L    Chloride 110 96 - 112 mmol/L    Co2 20 20 - 33 mmol/L    Anion Gap 13.0 7.0 - 16.0    Glucose 218 (H) 65 - 99 mg/dL    Bun 33 (H) 8 - 22 mg/dL    Creatinine 1.48 (H) 0.50 - 1.40 mg/dL    Calcium 8.1 (L) 8.5 - 10.5 mg/dL    Correct Calcium 9.3 8.5 - 10.5 mg/dL    AST(SGOT) 32 12 - 45 U/L    ALT(SGPT) 50 2 - 50 U/L    Alkaline Phosphatase 300 (H) 30 - 99 U/L    Total Bilirubin 1.6 (H) 0.1 - 1.5 mg/dL    Albumin 2.5 (L) 3.2 - 4.9 g/dL    Total Protein 6.0 6.0 - 8.2 g/dL    Globulin 3.5 1.9 - 3.5 g/dL    A-G Ratio 0.7 g/dL   ESTIMATED GFR    Collection Time: 03/22/24  5:08 AM   Result Value Ref Range    GFR (CKD-EPI) 47 (A) >60 mL/min/1.73 m 2   POCT glucose device results    Collection Time: 03/22/24  5:21 AM   Result Value Ref Range    POC Glucose, Blood 189 (H) 65 - 99 mg/dL       Radiology Review:  CT-ABDOMEN-PELVIS WITH   Final Result      1.  Hepatic steatosis.      2.  Cholelithiasis.      3.  Colonic diverticulosis.      4.  Mild atherosclerotic plaquing of the abdominal aorta and iliac arteries      US-RUQ   Final Result         1.  Hepatic steatosis.      2.  Cholelithiasis with gallbladder wall thickening suggestive of acute or chronic cholecystitis.      DX-CHEST-PORTABLE (1 VIEW)   Final Result      No acute cardiopulmonary disease evident.            MDM (Data Review):   -Records reviewed and summarized in current documentation  -I personally reviewed  and interpreted the laboratory results  -I personally reviewed the radiology images    Assessment/Recommendations:  Gallstone pancreatitis  Transaminitis  Hyperbilirubinemia-downtrending  S/p laparoscopic converted to open cholecystectomy  Type 2 diabetes mellitus    MDM:  Is an 81-year-old male with a past medical history as listed above who presented to [facility] on 3/18/2024 with gallstone pancreatitis.  Initial bilirubin 5.  CT abdomen pelvis with finding supportive of interstitial edematous pancreatitis.  Patient went for laparoscopic cholecystectomy but was converted to open cholecystectomy due to gallbladder perforation into the liver with small abscess.  IOC was unable to be completed.  JOVANA drain in place.  Postoperatively, patient AAOx4.  Reports feeling well without nausea, vomiting.  Mild right upper quadrant abdominal pain with palpation.  Total bilirubin continues to decrease 1.6.  ALT/AST normal, alk phos elevated at 300.  JOVANA drain with small amount of nonbilious serosanguineous fluid.  Low suspicion for bile leak.  Patient states he has not had a bowel movement in several days but is passing flatus.  He states that he is eager to walk with staff later today.  Encouraged walking as well as sitting in chair to promote peristalsis.  Patient in good spirits and verbalizes understanding.    Plan:  Trend LFTs  Surgical drain management and diet per surgical team    No further interventions from acute GI service. GI to sign off. Please reconsult for any further questions or concerns.    Discussed with patient, Dr. Chatterjee    Core Quality Measures   Reviewed items::  Labs, Medications and Radiology reports reviewed     patient states that she is 16 weeks pregnant complaining of abdominal a pain and wants her bp checked

## 2024-07-02 NOTE — ED ADULT NURSE NOTE - OBJECTIVE STATEMENT
What Type Of Note Output Would You Prefer (Optional)?: Standard Output
How Severe Is Your Skin Lesion?: mild
Has Your Skin Lesion Been Treated?: not been treated
Is This A New Presentation, Or A Follow-Up?: Skin Lesion
pt c/o burning on urination on and off since 7/2018 today she states that she has chest pain when ever she takes a very deep breath. pt also states that she feels very anxious recently since moving into the shelter with her four children. pt admits to daily drinking.

## 2024-12-14 ENCOUNTER — EMERGENCY (EMERGENCY)
Facility: HOSPITAL | Age: 39
LOS: 1 days | Discharge: HOME | End: 2024-12-14
Attending: EMERGENCY MEDICINE
Payer: MEDICAID

## 2024-12-14 VITALS
DIASTOLIC BLOOD PRESSURE: 99 MMHG | WEIGHT: 167.99 LBS | TEMPERATURE: 98 F | RESPIRATION RATE: 18 BRPM | HEIGHT: 67 IN | OXYGEN SATURATION: 100 % | HEART RATE: 91 BPM | SYSTOLIC BLOOD PRESSURE: 156 MMHG

## 2024-12-14 LAB
ALBUMIN SERPL ELPH-MCNC: 3.7 G/DL — SIGNIFICANT CHANGE UP (ref 3.3–5.2)
ALP SERPL-CCNC: 45 U/L — SIGNIFICANT CHANGE UP (ref 40–120)
ALT FLD-CCNC: 14 U/L — SIGNIFICANT CHANGE UP
ANION GAP SERPL CALC-SCNC: 12 MMOL/L — SIGNIFICANT CHANGE UP (ref 5–17)
APPEARANCE UR: CLEAR — SIGNIFICANT CHANGE UP
AST SERPL-CCNC: 15 U/L — SIGNIFICANT CHANGE UP
BACTERIA # UR AUTO: NEGATIVE /HPF — SIGNIFICANT CHANGE UP
BASOPHILS # BLD AUTO: 0.04 K/UL — SIGNIFICANT CHANGE UP (ref 0–0.2)
BASOPHILS NFR BLD AUTO: 0.4 % — SIGNIFICANT CHANGE UP (ref 0–2)
BILIRUB SERPL-MCNC: <0.2 MG/DL — LOW (ref 0.4–2)
BILIRUB UR-MCNC: NEGATIVE — SIGNIFICANT CHANGE UP
BLD GP AB SCN SERPL QL: SIGNIFICANT CHANGE UP
BUN SERPL-MCNC: 5.1 MG/DL — LOW (ref 8–20)
CALCIUM SERPL-MCNC: 9.2 MG/DL — SIGNIFICANT CHANGE UP (ref 8.4–10.5)
CAST: 1 /LPF — SIGNIFICANT CHANGE UP (ref 0–4)
CHLORIDE SERPL-SCNC: 105 MMOL/L — SIGNIFICANT CHANGE UP (ref 96–108)
CO2 SERPL-SCNC: 22 MMOL/L — SIGNIFICANT CHANGE UP (ref 22–29)
COLOR SPEC: YELLOW — SIGNIFICANT CHANGE UP
CREAT SERPL-MCNC: 0.52 MG/DL — SIGNIFICANT CHANGE UP (ref 0.5–1.3)
DIFF PNL FLD: ABNORMAL
EGFR: 121 ML/MIN/1.73M2 — SIGNIFICANT CHANGE UP
EGFR: 121 ML/MIN/1.73M2 — SIGNIFICANT CHANGE UP
EOSINOPHIL # BLD AUTO: 0.03 K/UL — SIGNIFICANT CHANGE UP (ref 0–0.5)
EOSINOPHIL NFR BLD AUTO: 0.3 % — SIGNIFICANT CHANGE UP (ref 0–6)
GLUCOSE SERPL-MCNC: 84 MG/DL — SIGNIFICANT CHANGE UP (ref 70–99)
GLUCOSE UR QL: NEGATIVE MG/DL — SIGNIFICANT CHANGE UP
HCG SERPL-ACNC: HIGH MIU/ML
HCT VFR BLD CALC: 30.1 % — LOW (ref 34.5–45)
HGB BLD-MCNC: 10.2 G/DL — LOW (ref 11.5–15.5)
HIV 1 & 2 AB SERPL IA.RAPID: SIGNIFICANT CHANGE UP
IMM GRANULOCYTES NFR BLD AUTO: 0.6 % — SIGNIFICANT CHANGE UP (ref 0–0.9)
KETONES UR-MCNC: NEGATIVE MG/DL — SIGNIFICANT CHANGE UP
LEUKOCYTE ESTERASE UR-ACNC: NEGATIVE — SIGNIFICANT CHANGE UP
LYMPHOCYTES # BLD AUTO: 1.58 K/UL — SIGNIFICANT CHANGE UP (ref 1–3.3)
LYMPHOCYTES # BLD AUTO: 16.6 % — SIGNIFICANT CHANGE UP (ref 13–44)
MCHC RBC-ENTMCNC: 29.5 PG — SIGNIFICANT CHANGE UP (ref 27–34)
MCHC RBC-ENTMCNC: 33.9 G/DL — SIGNIFICANT CHANGE UP (ref 32–36)
MCV RBC AUTO: 87 FL — SIGNIFICANT CHANGE UP (ref 80–100)
MONOCYTES # BLD AUTO: 0.4 K/UL — SIGNIFICANT CHANGE UP (ref 0–0.9)
MONOCYTES NFR BLD AUTO: 4.2 % — SIGNIFICANT CHANGE UP (ref 2–14)
NEUTROPHILS # BLD AUTO: 7.38 K/UL — SIGNIFICANT CHANGE UP (ref 1.8–7.4)
NEUTROPHILS NFR BLD AUTO: 77.9 % — HIGH (ref 43–77)
NITRITE UR-MCNC: NEGATIVE — SIGNIFICANT CHANGE UP
PH UR: 6 — SIGNIFICANT CHANGE UP (ref 5–8)
PLATELET # BLD AUTO: 376 K/UL — SIGNIFICANT CHANGE UP (ref 150–400)
POTASSIUM SERPL-MCNC: 3.9 MMOL/L — SIGNIFICANT CHANGE UP (ref 3.5–5.3)
POTASSIUM SERPL-SCNC: 3.9 MMOL/L — SIGNIFICANT CHANGE UP (ref 3.5–5.3)
PROT SERPL-MCNC: 6.2 G/DL — LOW (ref 6.6–8.7)
PROT UR-MCNC: NEGATIVE MG/DL — SIGNIFICANT CHANGE UP
RBC # BLD: 3.46 M/UL — LOW (ref 3.8–5.2)
RBC # FLD: 12.9 % — SIGNIFICANT CHANGE UP (ref 10.3–14.5)
RBC CASTS # UR COMP ASSIST: 1 /HPF — SIGNIFICANT CHANGE UP (ref 0–4)
SODIUM SERPL-SCNC: 139 MMOL/L — SIGNIFICANT CHANGE UP (ref 135–145)
SP GR SPEC: 1.01 — SIGNIFICANT CHANGE UP (ref 1–1.03)
SQUAMOUS # UR AUTO: 2 /HPF — SIGNIFICANT CHANGE UP (ref 0–5)
UROBILINOGEN FLD QL: 1 MG/DL — SIGNIFICANT CHANGE UP (ref 0.2–1)
WBC # BLD: 9.49 K/UL — SIGNIFICANT CHANGE UP (ref 3.8–10.5)
WBC # FLD AUTO: 9.49 K/UL — SIGNIFICANT CHANGE UP (ref 3.8–10.5)
WBC UR QL: 2 /HPF — SIGNIFICANT CHANGE UP (ref 0–5)

## 2024-12-14 PROCEDURE — 85025 COMPLETE CBC W/AUTO DIFF WBC: CPT

## 2024-12-14 PROCEDURE — 76805 OB US >/= 14 WKS SNGL FETUS: CPT | Mod: 26

## 2024-12-14 PROCEDURE — 99284 EMERGENCY DEPT VISIT MOD MDM: CPT

## 2024-12-14 PROCEDURE — 86780 TREPONEMA PALLIDUM: CPT

## 2024-12-14 PROCEDURE — 87591 N.GONORRHOEAE DNA AMP PROB: CPT

## 2024-12-14 PROCEDURE — 86850 RBC ANTIBODY SCREEN: CPT

## 2024-12-14 PROCEDURE — 76805 OB US >/= 14 WKS SNGL FETUS: CPT

## 2024-12-14 PROCEDURE — 87491 CHLMYD TRACH DNA AMP PROBE: CPT

## 2024-12-14 PROCEDURE — 80053 COMPREHEN METABOLIC PANEL: CPT

## 2024-12-14 PROCEDURE — 84702 CHORIONIC GONADOTROPIN TEST: CPT

## 2024-12-14 PROCEDURE — 87086 URINE CULTURE/COLONY COUNT: CPT

## 2024-12-14 PROCEDURE — 36415 COLL VENOUS BLD VENIPUNCTURE: CPT

## 2024-12-14 PROCEDURE — 86703 HIV-1/HIV-2 1 RESULT ANTBDY: CPT

## 2024-12-14 PROCEDURE — 81001 URINALYSIS AUTO W/SCOPE: CPT

## 2024-12-14 PROCEDURE — 99284 EMERGENCY DEPT VISIT MOD MDM: CPT | Mod: 25

## 2024-12-14 PROCEDURE — 86900 BLOOD TYPING SEROLOGIC ABO: CPT

## 2024-12-14 PROCEDURE — 86901 BLOOD TYPING SEROLOGIC RH(D): CPT

## 2024-12-14 RX ORDER — ACETAMINOPHEN 500 MG/5ML
650 LIQUID (ML) ORAL ONCE
Refills: 0 | Status: COMPLETED | OUTPATIENT
Start: 2024-12-14 | End: 2024-12-14

## 2024-12-14 RX ADMIN — Medication 650 MILLIGRAM(S): at 21:45

## 2024-12-14 RX ADMIN — Medication 650 MILLIGRAM(S): at 20:45

## 2024-12-15 LAB — T PALLIDUM AB TITR SER: NEGATIVE — SIGNIFICANT CHANGE UP

## 2024-12-16 LAB
C TRACH RRNA SPEC QL NAA+PROBE: SIGNIFICANT CHANGE UP
CULTURE RESULTS: SIGNIFICANT CHANGE UP
N GONORRHOEA RRNA SPEC QL NAA+PROBE: SIGNIFICANT CHANGE UP
SPECIMEN SOURCE: SIGNIFICANT CHANGE UP
